# Patient Record
Sex: MALE | Race: WHITE | Employment: OTHER | URBAN - METROPOLITAN AREA
[De-identification: names, ages, dates, MRNs, and addresses within clinical notes are randomized per-mention and may not be internally consistent; named-entity substitution may affect disease eponyms.]

---

## 2017-08-09 ENCOUNTER — OFFICE VISIT (OUTPATIENT)
Dept: ORTHOPEDIC SURGERY | Age: 62
End: 2017-08-09

## 2017-08-09 VITALS
BODY MASS INDEX: 25.76 KG/M2 | SYSTOLIC BLOOD PRESSURE: 128 MMHG | HEIGHT: 71 IN | DIASTOLIC BLOOD PRESSURE: 86 MMHG | WEIGHT: 184 LBS | HEART RATE: 94 BPM

## 2017-08-09 DIAGNOSIS — M19.011 PRIMARY OSTEOARTHRITIS OF RIGHT SHOULDER: Primary | ICD-10-CM

## 2017-08-09 DIAGNOSIS — Z96.612 H/O TOTAL SHOULDER REPLACEMENT, LEFT: ICD-10-CM

## 2017-08-09 PROCEDURE — 20610 DRAIN/INJ JOINT/BURSA W/O US: CPT | Performed by: ORTHOPAEDIC SURGERY

## 2018-03-28 ENCOUNTER — OFFICE VISIT (OUTPATIENT)
Dept: ORTHOPEDIC SURGERY | Age: 63
End: 2018-03-28

## 2018-03-28 VITALS
WEIGHT: 190 LBS | SYSTOLIC BLOOD PRESSURE: 154 MMHG | HEIGHT: 71 IN | BODY MASS INDEX: 26.6 KG/M2 | HEART RATE: 84 BPM | DIASTOLIC BLOOD PRESSURE: 99 MMHG

## 2018-03-28 DIAGNOSIS — M25.511 RIGHT SHOULDER PAIN, UNSPECIFIED CHRONICITY: ICD-10-CM

## 2018-03-28 DIAGNOSIS — M19.011 PRIMARY OSTEOARTHRITIS OF RIGHT SHOULDER: Primary | ICD-10-CM

## 2018-03-28 DIAGNOSIS — Z96.612 HISTORY OF TOTAL REPLACEMENT OF LEFT SHOULDER JOINT: ICD-10-CM

## 2018-03-28 PROCEDURE — 99214 OFFICE O/P EST MOD 30 MIN: CPT | Performed by: ORTHOPAEDIC SURGERY

## 2018-03-28 PROCEDURE — 20610 DRAIN/INJ JOINT/BURSA W/O US: CPT | Performed by: ORTHOPAEDIC SURGERY

## 2018-03-29 NOTE — PROGRESS NOTES
History of Present Illness:  [address] [place] [address] for follow-up of both shoulders. With regards to his left shoulder he is now 4-1/2 years out following left anatomic total shoulder replacement. He continues to do very well with the left shoulder. He is nearly completely retired and only does a little bit of work. He has no limitations with regards to the left shoulder. This despite that we had performed this is a revision of failed resurfacing and staged reimplantation for infection. With regards to his right shoulder he has advanced osteoarthritis. I saw him about a year and half ago. I gave him a corticosteroid injection which helped for a while. He is able to hold onto pretty good function. He really isn't doing as much. He is to do heavy work and he is not doing so much. His shoulder doesn't bother him. 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 He has been taking over-the-counter anti-inflammatories. The pain in his right shoulder started about 3 or 4 years ago. Medical History:  Patient's medications, allergies, past medical, surgical, social and family histories were reviewed and updated as appropriate. Pertinent items are noted in HPI  Review of systems reviewed from Patient History Form dated on March 28, 2018 and available in the patient's chart under the Media tab. Vital Signs:  Vitals:    03/28/18 1722   BP: (!) 154/99   Pulse: 84     Constitutional: he is in no apparent distress. He appears younger than his stated age of 57. Left Shoulder Examination:    Inspection:  Well-healed surgical scar. No atrophy. No signs of an infection. Palpation:  No crepitation. Active Range of Motion: active forward elevation to 150 with good rhythm. Abduction to 130. Internal rotation to the back to T12. Passive Range of Motion:  Passive external rotation to 50. Strength:  Negative belly press test.  4 over 5 4+ over 5 external rotation strength.     Special impression is that Abrahan Sy is doing well. He would benefit from a corticosteroid injection on the right side for the osteoarthritis. We'll see him again for follow-up in a year and we will obtain repeat x-rays of his left shoulder at that time. We will also see him again as needed for additional treatment including repeat corticosteroid injection. All questions were answered today. Risks and benefits of a corticosteroid injection were discussed with Marlin Hess. 80 milligrams of Depo Medrol and 8 CC of 1% lidocaine were injected in the right Shoulder glenohumeral joint following chlorhexidine prep. He  tolerated the procedure well with no immediate adverse sequelae after the injection. Merritt Bowie MD, PhD  3/28/2018

## 2019-02-06 ENCOUNTER — OFFICE VISIT (OUTPATIENT)
Dept: ORTHOPEDIC SURGERY | Age: 64
End: 2019-02-06
Payer: COMMERCIAL

## 2019-02-06 VITALS
BODY MASS INDEX: 28.79 KG/M2 | HEIGHT: 68 IN | WEIGHT: 190 LBS | HEART RATE: 84 BPM | DIASTOLIC BLOOD PRESSURE: 86 MMHG | SYSTOLIC BLOOD PRESSURE: 126 MMHG

## 2019-02-06 DIAGNOSIS — Z96.612 H/O TOTAL SHOULDER REPLACEMENT, LEFT: ICD-10-CM

## 2019-02-06 DIAGNOSIS — M25.512 LEFT SHOULDER PAIN, UNSPECIFIED CHRONICITY: Primary | ICD-10-CM

## 2019-02-06 DIAGNOSIS — M19.011 PRIMARY OSTEOARTHRITIS OF RIGHT SHOULDER: ICD-10-CM

## 2019-02-06 PROCEDURE — 99214 OFFICE O/P EST MOD 30 MIN: CPT | Performed by: ORTHOPAEDIC SURGERY

## 2019-02-06 PROCEDURE — 20610 DRAIN/INJ JOINT/BURSA W/O US: CPT | Performed by: ORTHOPAEDIC SURGERY

## 2020-10-01 ENCOUNTER — OFFICE VISIT (OUTPATIENT)
Dept: ORTHOPEDIC SURGERY | Age: 65
End: 2020-10-01
Payer: COMMERCIAL

## 2020-10-01 VITALS — WEIGHT: 180 LBS | HEIGHT: 68 IN | BODY MASS INDEX: 27.28 KG/M2

## 2020-10-01 PROCEDURE — 99214 OFFICE O/P EST MOD 30 MIN: CPT | Performed by: ORTHOPAEDIC SURGERY

## 2020-10-01 PROCEDURE — 20610 DRAIN/INJ JOINT/BURSA W/O US: CPT | Performed by: ORTHOPAEDIC SURGERY

## 2020-10-01 RX ORDER — METHYLPREDNISOLONE ACETATE 40 MG/ML
80 INJECTION, SUSPENSION INTRA-ARTICULAR; INTRALESIONAL; INTRAMUSCULAR; SOFT TISSUE ONCE
Status: COMPLETED | OUTPATIENT
Start: 2020-10-01 | End: 2020-10-01

## 2020-10-01 RX ORDER — LIDOCAINE HYDROCHLORIDE 10 MG/ML
8 INJECTION, SOLUTION INFILTRATION; PERINEURAL ONCE
Status: COMPLETED | OUTPATIENT
Start: 2020-10-01 | End: 2020-10-01

## 2020-10-01 RX ADMIN — LIDOCAINE HYDROCHLORIDE 8 ML: 10 INJECTION, SOLUTION INFILTRATION; PERINEURAL at 11:30

## 2020-10-01 RX ADMIN — METHYLPREDNISOLONE ACETATE 80 MG: 40 INJECTION, SUSPENSION INTRA-ARTICULAR; INTRALESIONAL; INTRAMUSCULAR; SOFT TISSUE at 11:30

## 2020-10-01 NOTE — PROGRESS NOTES
Chief Complaint    Shoulder Pain (Bilateral )      History of Present Illness:  Prasad Escalante is a pleasant, 77 y.o., male, here today for follow up of his bilateral shoulders. He is now greater than 7 years out following a left anatomic total shoulder replacement performed as a revision of a failed resurfacing by another surgeon. This shoulder continues to do well. He has no pain with daily activities. With respect to the right shoulder, we have been treating him for several years now for slowly advancing primary osteoarthritis. He has received intermittent corticosteroid injections. He is hoping for an injection today. He does report these injections provide significant relief from symptoms for an extended period of time. He reports no new injuries or setbacks. Pain Assessment  Location of Pain: Shoulder  Location Modifiers: Left, Right  Severity of Pain: 2  Quality of Pain: Aching  Duration of Pain: Persistent  Frequency of Pain: Intermittent  Aggravating Factors: (overuse, sleeping)  Limiting Behavior: Some  Relieving Factors: Rest, Ice  Work-Related Injury: No  Are there other pain locations you wish to document?: No      Medical History:  Patient's medications, allergies, past medical, surgical, social and family histories were reviewed and updated as appropriate. Review of Systems  A 14 point review of systems was completed by the patient on 10/1/2020 and is available in the media section of the scanned medical record and was reviewed on 10/1/2020. The review is negative with the exception of those things mentioned in the HPI and Past Medical History    Vital Signs:  Vitals:       General/Appearance: Alert and oriented and in no apparent distress. Skin:  There are no skin lesions, cellulitis, or extreme edema. The patient has warm and well-perfused Bilateral upper extremities with brisk capillary refill.       Right Shoulder Exam:  Inspection: No gross deformities, no signs of Primary?  Bilateral shoulder pain, unspecified chronicity Yes    Status post total replacement of left shoulder     Primary osteoarthritis of right shoulder        Office Procedures:  Orders Placed This Encounter   Procedures    XR SHOULDER LEFT (MIN 2 VIEWS)     Standing Status:   Future     Number of Occurrences:   1     Standing Expiration Date:   10/1/2021     Order Specific Question:   Reason for exam:     Answer:   pain    XR SHOULDER RIGHT (MIN 2 VIEWS)     Standing Status:   Future     Number of Occurrences:   1     Standing Expiration Date:   10/1/2021     Order Specific Question:   Reason for exam:     Answer:   pain    RI ARTHROCENTESIS ASPIR&/INJ MAJOR JT/BURSA W/O US     80 mg Depomedrol with 8 cc 1% Lidocaine in 10cc syringe with 25G (22G) needle       Treatment Plan:  We explained that he does have advancing osteoarthritis of the right shoulder, however he compensates reasonably well for this. We did explain that he will likely require a total shoulder replacement at some point in the future. For now, we may continue with injections as long as they provide relief from symptoms. We would like to see this patient back every year or so to monitor progression of the arthritis, however we explained that he should be seen sooner for a significant decrease in function or increase in symptoms. Johana Velasco is in agreement with this. We will see Rachael Acosta back in 1 year and/or as needed. All questions were answered to patient's satisfaction and He was encouraged to call with any further questions or concerns. Wilhelmina Severs is in agreement with this plan. After discussing the risks and benefits of a corticosteroid injection, Rachael Acosta did state an understanding and gave verbal consent to proceed. After cleansing the injection site with Chlora-prep and using aseptic techniques,  2 CC of Depo Medrol 40mg/ml and 8 CC of 1% lidocaine were injected in the right glenohumeral joint.  He  tolerated the procedure well with no immediate adverse sequelae after the injection. A bandage was placed over the injection site. Appropriate post injections instructions were given to the patient. 10/1/2020  10:56 AM    Jasmyne Sehr ATC  [facility]    During this examination, I, Magui Swift, functioned as a scribe for Dr. Mary Morgan. The history taking and physical examination were performed by Dr. Tierra Quach. All counseling during the appointment was performed between the patient and Dr. Tierra Quach. 10/1/20   __________________  I, Dr. Mary Morgan, personally performed the services described in this documentation as described by Jasmyne Sher ATC in my presence, and it is both accurate and complete. Merritt Quach MD, PhD  10/1/2020

## 2021-08-16 ENCOUNTER — HOSPITAL ENCOUNTER (OUTPATIENT)
Dept: GENERAL RADIOLOGY | Age: 66
Discharge: HOME | End: 2021-08-16
Payer: COMMERCIAL

## 2021-08-16 ENCOUNTER — HOSPITAL ENCOUNTER (OUTPATIENT)
Age: 66
Discharge: HOME | End: 2021-08-16
Payer: COMMERCIAL

## 2021-08-16 DIAGNOSIS — Z00.00 ROUTINE GENERAL MEDICAL EXAMINATION AT A HEALTH CARE FACILITY: ICD-10-CM

## 2021-08-16 PROCEDURE — 71046 X-RAY EXAM CHEST 2 VIEWS: CPT

## 2021-09-29 ENCOUNTER — OFFICE VISIT (OUTPATIENT)
Dept: ORTHOPEDIC SURGERY | Age: 66
End: 2021-09-29
Payer: COMMERCIAL

## 2021-09-29 VITALS — BODY MASS INDEX: 27.28 KG/M2 | WEIGHT: 180 LBS | HEIGHT: 68 IN

## 2021-09-29 DIAGNOSIS — M19.011 PRIMARY OSTEOARTHRITIS OF RIGHT SHOULDER: Primary | ICD-10-CM

## 2021-09-29 PROCEDURE — 20610 DRAIN/INJ JOINT/BURSA W/O US: CPT | Performed by: ORTHOPAEDIC SURGERY

## 2021-09-29 PROCEDURE — 99213 OFFICE O/P EST LOW 20 MIN: CPT | Performed by: ORTHOPAEDIC SURGERY

## 2021-09-29 RX ORDER — LIDOCAINE HYDROCHLORIDE 10 MG/ML
20 INJECTION, SOLUTION INFILTRATION; PERINEURAL ONCE
Status: COMPLETED | OUTPATIENT
Start: 2021-09-29 | End: 2021-09-29

## 2021-09-29 RX ORDER — METHYLPREDNISOLONE ACETATE 40 MG/ML
40 INJECTION, SUSPENSION INTRA-ARTICULAR; INTRALESIONAL; INTRAMUSCULAR; SOFT TISSUE ONCE
Status: COMPLETED | OUTPATIENT
Start: 2021-09-29 | End: 2021-09-29

## 2021-09-29 RX ADMIN — METHYLPREDNISOLONE ACETATE 40 MG: 40 INJECTION, SUSPENSION INTRA-ARTICULAR; INTRALESIONAL; INTRAMUSCULAR; SOFT TISSUE at 13:05

## 2021-09-29 RX ADMIN — LIDOCAINE HYDROCHLORIDE 20 ML: 10 INJECTION, SOLUTION INFILTRATION; PERINEURAL at 13:05

## 2021-09-29 NOTE — PROGRESS NOTES
Chief Complaint    Follow-up (Right shoulder)      History of Present Illness:  Jolanta Solomon is a pleasant, 74 y.o., male, here today for follow up of his right shoulder. This patient has slowly advancing right glenohumeral osteoarthritis. We have treated him conservatively for many years for this problem. we have been administering intra-articular corticosteroid injections. His most recent injection was October 2020. This provided many months of relief. He is hoping for repeat injection today. He reports no new injuries or setbacks. Pain Assessment  Location of Pain: Shoulder  Location Modifiers: Right  Severity of Pain: 3  Quality of Pain: Aching, Dull  Duration of Pain: A few days  Frequency of Pain: Constant  Aggravating Factors: Other (Comment) (n/a)  Relieving Factors: Rest  Result of Injury: No  Work-Related Injury: No  Are there other pain locations you wish to document?: No      Medical History:  Patient's medications, allergies, past medical, surgical, social and family histories were reviewed and updated as appropriate. Review of Systems  A 14 point review of systems was completed by the patient on 10/1/20 and is available in the media section of the scanned medical record and was reviewed on 9/29/2021. The review is negative with the exception of those things mentioned in the HPI and Past Medical History    Vital Signs:  Vitals:       General/Appearance: Alert and oriented and in no apparent distress. Skin:  There are no skin lesions, cellulitis, or extreme edema. The patient has warm and well-perfused Bilateral upper extremities with brisk capillary refill. Right Shoulder Exam:  Inspection:  No gross deformities, no signs of infection. Palpation: Joint line tenderness    Active Range of Motion:   Forward Elevation 140 vs 160 on the elft, External Rotation 30 vs 50, Internal Rotation L4    Passive Range of Motion: Deferred     Strength: Deferred    Special Tests: No Richard muscle deformity. Neurovascular: Sensation to light touch is intact, no motor deficits, palpable radial pulses 2+      Radiology:     No new XR obtained at this time. Assessment :  Mr. Agapito Kasper is a pleasant, 74 y.o. patient with moderate to severe glenohumeral osteoarthritis. He is aware he is heading towards a total shoulder arthroplasty but we compensates well in terms of motion and we do not feel he will require this operation in the near future. Impression:  Encounter Diagnosis   Name Primary?  Primary osteoarthritis of right shoulder Yes       Office Procedures:  Orders Placed This Encounter   Procedures    PA ARTHROCENTESIS ASPIR&/INJ MAJOR JT/BURSA W/O US       Treatment Plan: We will continue in conservative management as long as he finds meaningful relief from symptoms. We will repeat the injection today. We may continue to repeat these injections as needed. We will see Tillie Frankel back as needed. All questions were answered to patient's satisfaction and He was encouraged to call with any further questions or concerns. Romayne Sacramento is in agreement with this plan. After discussing the risks and benefits of a corticosteroid injection, Tillie Frankel did state an understanding and gave verbal consent to proceed. After cleansing the injection site with Chlora-prep and using aseptic techniques,  2 CC of Depo Medrol 40mg/ml and 8 CC of 1% lidocaine were injected in the right glenohumeral joint. He  tolerated the procedure well with no immediate adverse sequelae after the injection. A bandage was placed over the injection site. Appropriate post injections instructions were given to the patient. 9/29/2021  1:28 PM    Raphael Aguilar, ALANA  [facility] [address] [facility]    During this examination, I, Mauricio Navdeep, functioned as a scribe for Dr. René Moser. The history taking and physical examination were performed by Dr. Hannah Rolon.  All counseling during the appointment was performed between the patient and Dr. Tierra Quach. 9/29/21    ______________  I, Dr. Mary Morgan, personally performed the services described in this documentation as described by Jasmyne Sher ATC in my presence, and it is both accurate and complete. Merritt Quach MD, PhD  9/29/2021

## 2021-11-08 ENCOUNTER — PROCEDURE VISIT (OUTPATIENT)
Dept: AUDIOLOGY | Age: 66
End: 2021-11-08
Payer: COMMERCIAL

## 2021-11-08 ENCOUNTER — OFFICE VISIT (OUTPATIENT)
Dept: ENT CLINIC | Age: 66
End: 2021-11-08
Payer: COMMERCIAL

## 2021-11-08 VITALS
WEIGHT: 174 LBS | SYSTOLIC BLOOD PRESSURE: 147 MMHG | HEART RATE: 66 BPM | DIASTOLIC BLOOD PRESSURE: 78 MMHG | HEIGHT: 68 IN | BODY MASS INDEX: 26.37 KG/M2

## 2021-11-08 DIAGNOSIS — H91.90 HEARING LOSS, UNSPECIFIED HEARING LOSS TYPE, UNSPECIFIED LATERALITY: Primary | ICD-10-CM

## 2021-11-08 DIAGNOSIS — H93.13 TINNITUS OF BOTH EARS: ICD-10-CM

## 2021-11-08 DIAGNOSIS — H90.3 SENSORINEURAL HEARING LOSS (SNHL) OF BOTH EARS: ICD-10-CM

## 2021-11-08 DIAGNOSIS — H90.3 SENSORINEURAL HEARING LOSS (SNHL) OF BOTH EARS: Primary | ICD-10-CM

## 2021-11-08 DIAGNOSIS — H93.13 TINNITUS OF BOTH EARS: Primary | ICD-10-CM

## 2021-11-08 PROCEDURE — 92557 COMPREHENSIVE HEARING TEST: CPT | Performed by: AUDIOLOGIST

## 2021-11-08 PROCEDURE — 99203 OFFICE O/P NEW LOW 30 MIN: CPT | Performed by: OTOLARYNGOLOGY

## 2021-11-08 PROCEDURE — 92567 TYMPANOMETRY: CPT | Performed by: AUDIOLOGIST

## 2021-11-08 NOTE — PROGRESS NOTES
Kary Maria   1955, 74 y.o. male   [id number]       Referring Provider: Kaylene Brady MD  Referral Type: In an order in [address] [place] [id number]    Reason for Visit: Evaluation of suspected change in hearing, tinnitus, or balance. ADULT AUDIOLOGIC EVALUATION      Kary Maria is a 74 y.o. male seen today, 11/8/2021 , for an initial audiologic evaluation. Patient was seen by Kaylene Brady MD following today's evaluation. AUDIOLOGIC AND OTHER PERTINENT MEDICAL HISTORY:      Kary Maria noted tinnitus and history of occupational noise exposure. Patient reports progressive, bilateral, tinnitus that is intermittent in nature. 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 Kary Maria denied otalgia, aural fullness, otorrhea, dizziness, imbalance, history of falls, history of head trauma, history of ear surgery and family history of hearing loss. Date: 11/8/2021     IMPRESSIONS:      AD: Hearing WNL sloping to Moderately-Severe rising to Moderate SNHL, Excellent WRS, Type A tymp  AS: Hearing WNL sloping to Mild SNHL, Excellent WRS, Type A tymp    Test results consistent with asymmetric Sensorineural hearing loss. Hearing loss significant enough to create hearing difficulty in some listening situations. Discussed hearing loss, tinnitus, hearing aids and NIHL with patient. Patient to follow medical recommendations per Kaylene Brady MD .    ASSESSMENT AND FINDINGS:     Otoscopy revealed: Clear ear canals bilaterally    RIGHT EAR:  Hearing Sensitivity: Normal hearing sensitivity to Moderately-Severe to Moderate Sensorineural hearing loss  Speech Recognition Threshold: 15 dB HL  Word Recognition:Excellent (100%), based on NU-6 25-word list at 65 dBHL using recorded speech stimuli. Tympanometry: Normal peak pressure and compliance, Type A tympanogram, consistent with normal middle ear function.   Acoustic Reflexes: Ipsilateral: Could not maintain seal. Contralateral: Could not maintain seal.    LEFT EAR:  Hearing Sensitivity: Normal hearing sensitivity to Mild   Sensorineural hearing loss  Speech Recognition Threshold: 15 dB HL  Word Recognition:Excellent (100%), based on NU-6 25-word list at 65 dBHL using recorded speech stimuli. Tympanometry: Normal peak pressure and compliance, Type A tympanogram, consistent with normal middle ear function. Acoustic Reflexes: Ipsilateral: Could not maintain seal. Contralateral: Could not maintain seal.    Reliability: Good  Transducer: Inserts    See scanned audiogram dated 11/8/2021  for results. PATIENT EDUCATION:     The following items were discussed with the patient:   - Good Communication Strategies  - Hearing Loss and Hearing Aids  - Tinnitus Management Strategies    - Noise-Induced Hearing Loss and use of Hearing Protection Devices (HPDs)     Educational information was shared in the After Visit Summary. RECOMMENDATIONS:                                                                                                                                                                                                                                                          The following items are recommended based on patient report and results from today's appointment:   - Continue medical follow-up with Christa Medina MD.   - Retest hearing as medically indicated and/or sooner if a change in hearing is noted. - If desired, schedule a Hearing Aid Evaluation (HAE) appointment to discuss hearing aid options. - Utilize \"Good Communication Strategies\" as discussed to assist in speech understanding with communication partners. - Maintain a sound enriched environment to assist in the management of tinnitus symptoms.  - Use hearing protection devices (HPDs), such as protective ear muffs and ear plugs, when exposed to dangerous sound levels.        Myrtle Montero  Audiologist    Chart CC'd to: Christa Medina

## 2021-11-08 NOTE — PROGRESS NOTES
[place]      Patient Name: [address] Record Number:  [id number]  Primary Care Physician:  Minnie Chavez  Date of Consultation: 11/8/2021    Chief Complaint: Ringing in the [address]  Frederick Olson is a(n) 74 y.o. male who presents for evaluation of ringing in the ears. Patient has been having ringing in the ears for quite some time. The little worse on the right side. 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 He is never had ear surgery. No ear pain. No other ear problems or complaints. Overall he feels as though he hears okay. REVIEW OF SYSTEMS  As above    PHYSICAL EXAM  GENERAL: No Acute Distress, Alert and Oriented, no Hoarseness, strong voice  EYES: EOMI, Anti-icteric  HENT:   Head: Normocephalic and atraumatic. Face:  Symmetric, facial nerve intact, no sinus tenderness  Right Ear: Normal external ear, normal external auditory canal, intact tympanic membrane with normal mobility and aerated middle ear  Left Ear: Normal external ear, normal external auditory canal, intact tympanic membrane with normal mobility and aerated middle ear  Mouth/Oral Cavity:  normal lips, Uvula is midline, no mucosal lesions  Oropharynx/Larynx:  normal oropharynx  Nose:Normal external nasal appearance. NECK: Normal range of motion, no thyromegaly, trachea is midline, no lymphadenopathy, no neck masses, no crepitus    Patient got an audiogram today that shows normal sloping to moderate sensorineural hearing loss on the right with normal sloping to mild sensorineural hearing loss on the left    ASSESSMENT/PLAN  1. Tinnitus of both ears  Secondary to the hearing loss. Recommended masking versus getting a hearing aid. Patient does not think it is bothersome enough that he needs a hearing aid yet.     2. Sensorineural hearing loss (SNHL) of both ears  There is a little bit of asymmetry so I recommend getting another hearing test in 1 year. If there is further asymmetry we will get an MRI. I have performed a head and neck physical exam personally or was physically present during the key or critical portions of the service. This note was generated completely or in part utilizing Dragon dictation speech recognition software. Occasionally, words are mistranscribed and despite editing, the text may contain inaccuracies due to incorrect word recognition. If further clarification is needed please contact the office at [phone].

## 2021-11-08 NOTE — Clinical Note
Dr. Brandyn Rob,    Please see note from this patient's audiogram from today. Please let me know if there is anything further you need.         Myrtle Estrada  Audiologist

## 2021-11-08 NOTE — PATIENT INSTRUCTIONS
If you are interested in pursuing hearing aids, here are the next steps:    1) Contact your insurance and ask, Do I have a benefit for hearing aids?    If the answer is no hearing aids will be a 100% out of pocket expense   If the answer is yes, ask Can I use this benefit at [facility] ([facility])?  or Where can I use this benefit?  If Eve Reyes is not in-network for hearing aids, your insurance should be able to provide the appropriate contact information for an in-network provider. 2) Call [facility] ([phone]) to schedule a Hearing Aid Evaluation    This appointment is when we will discuss hearing aid options and decide which device is most appropriate for you. Learning About Hearing Aids  What is a hearing aid? A hearing aid makes sounds louder. It can help some people with hearing problems to hear better. Hearing aids do not restore normal hearing. But they can make it easier to communicate by making sounds clearer. · Digital programmable hearing aids can adjust themselves to work best where you are at any time. You also have more choices in setting them up than with analog hearing aids. There are also different styles of hearing aids. · A behind-the-ear (BTE) hearing aid connects to a plastic ear mold that fits inside the outer ear. BTE hearing aids are used for all levels of hearing loss, especially very severe hearing loss. They may be better for children for safety and growth reasons. Poorly fitting BTE ear molds or a buildup of earwax may cause a whistling sound (feedback). · An in-the-ear (ITE) hearing aid fits in the outer part of the ear. It can be used by people with mild to severe hearing loss. ITE hearing aids can be used with other hearing devices, such as a telecoil that improves hearing during phone calls. ITE hearing aids can be damaged by earwax and fluid draining from the ear. Their small size may be hard for some people to handle.  They are not often used in children because the case must be replaced as the child grows. · An in-the-canal (ITC) hearing aid fits into the ear canal. ITC hearing aids are used by people with mild to moderate hearing loss. They are made to fit the shape and the size of your ear canal. They can be damaged by earwax and fluid draining from the ear. Their small size may be hard for some people to handle. They are not made for children. You have some options if you have a hearing problem and are thinking about getting hearing aids. You can go to your doctor or an audiologist. He or she will do a hearing test and help you decide which type and style of hearing aid may be best for you. What else should I know about hearing aids? Find out if your insurance covers hearing aids. They can be expensive. Different types of hearing aids come with different costs. Also find out about a warranty or return policy in case you are not happy with your hearing aids. Follow-up care is a key part of your treatment and safety. Be sure to make and go to all appointments, and call your doctor if you are having problems. It's also a good idea to know your test results and keep a list of the medicines you take. Where can you learn more? Go to https://[email or web address]. org and sign in to your [employer] account. Enter A976 in the [facility] box to learn more about \"Learning About Hearing Aids. \"     If you do not have an account, please click on the \"Sign Up Now\" link. Current as of: October 21, 2018  Content Version: 12.1  © 6188-0859 Healthwise, Incorporated. Care instructions adapted under license by [facility] ([facility]). If you have questions about a medical condition or this instruction, always ask your healthcare professional. [place] [address] any warranty or liability for your use of this information.       Hearing Loss: Care Instructions  Your Care Instructions      Hearing loss is a sudden or slow decrease in how well you hear. It can range from mild to profound. Permanent hearing loss can occur with aging, and it can happen when you are exposed long-term to loud noise. Examples include listening to loud music, riding motorcycles, or being around other loud machines. Hearing loss can affect your work and home life. It can make you feel lonely or depressed. You may feel that you have lost your independence. But hearing aids and other devices can help you hear better and feel connected to others. Follow-up care is a key part of your treatment and safety. Be sure to make and go to all appointments, and call your doctor if you are having problems. It's also a good idea to know your test results and keep a list of the medicines you take. How can you care for yourself at home? · Avoid loud noises whenever possible. This helps keep your hearing from getting worse. Always wear hearing protection around loud noises. · If appropriate, wear hearing aid(s) as directed. It is recommended that hearing aids are worn during all waking hours to keep your brain active and give it access to the sounds it is missing. · If you are beginning your process with hearing aid(s), schedule a \"Hearing Aid Evaluation\" with an audiologist to discuss your lifestyle, features of hearing aid technology, and styles of hearing aids available. It is recommended that you contact your insurance company to determine if you have a hearing aid benefit, as this may dictate who you can see for these services. · Have hearing tests as your doctor suggests. They can show whether your hearing has changed. Your hearing aid may need to be adjusted. · Use other assistive devices as needed. These may include:  ? Telephone amplifiers and hearing aids that can connect to a television, stereo, radio, or microphone. ? Devices that use lights or vibrations. These alert you to the doorbell, a ringing telephone, or a baby monitor. ? Television closed-captioning. This shows the words at the bottom of the screen. Most new TVs can do this. ? TTY (text telephone). This lets you type messages back and forth on the telephone instead of talking or listening. These devices are also called TDD. When messages are typed on the keyboard, they are sent over the phone line to a receiving TTY. The message is shown on a monitor. · Use pagers, fax machines, text, and email if it is hard for you to communicate by telephone. · Try to learn a listening technique called speech-reading. It is not lip-reading. You pay attention to people's gestures, expressions, posture, and tone of voice. These clues can help you understand what a person is saying. Face the person you are talking to, and have him or her face you. Make sure the lighting is good. You need to see the other person's face clearly. · Think about counseling if you need help to adjust to your hearing loss. When should you call for help? Watch closely for changes in your health, and be sure to contact your doctor if:    · You think your hearing is getting worse. · You have new symptoms, such as dizziness or nausea. Good Communication Strategies    Communication can be challenging for anyone, but can be especially difficult for those with some degree of hearing loss. While we may not be able to control every factor that may lead to difficulty with communication, there are Good Communication Strategies that we can all use in our day-to-day lives. Communication takes both parties working together for it to be successful. Tips as a Listener:   1. Control your environment. It is important to limit the amount of background noise in the room when possible. You should also consider having a good light source in the room to best see the other person. 2. Ask for clarification. Instead of saying \"What?\", you can use parts of what you heard to make a new question.   For example, if you heard the word \"Thursday\" but not the rest of the week, you may ask \"What was that about Thursday? \" or \"What did you want to do Thursday? \". This shows the person talking that you are listening and will help them better explain what they are saying. 3. Be an advocate for yourself. If you are hearing but not understanding, tell the other person \"I can hear you, but I need you to slow down when you speak. \"  Or if someone is facing the other direction, say \"I cannot hear you when you are not looking at me when we talk. \"       Tips as a Talker:   - Sit or stand 3 to 6 feet away to maximize audibility         -- It is unrealistic to believe someone else will fully hear your message if you are speaking from across the room or in a different room in the house   - Stay at eye level to help with visual cues   - Make sure you have the persons attention before speaking   - Use facial expressions and gestures to accentuate your message   - Raise your voice slightly (do not scream)   - Speak slowly and distinctly   - Use short, simple sentences   - Rephrase your words if the person is having a hard time understanding you    - To avoid distortion, dont speak directly into a persons ear      Some additional items that may be helpful:   - Remain patient - this is important for both parties   - Write down items that still cannot be heard/understood. You may write with pen/paper or consider typing/texting on a cell phone or smart device. - If background noise is unavoidable, try to keep yourself in a good position in the room. By sitting at a leon on the side of the restaurant (preferably a corner), it will be easier to communicate than if you were sitting at a table in the middle with background noise surrounding you. Keep yourself positioned away from music speakers or heavy foot traffic. Tinnitus: Overview and Management Strategies          Many people have some ringing sounds in their ears once in a while.  You may hear a roar, a hiss, a tinkle, or a having problems. It's also a good idea to know your test results and keep a list of the medicines you take. How can you care for yourself at home? · Limit or cut out alcohol, caffeine, and sodium. They can make your symptoms worse. · Do not smoke or use other tobacco products. Nicotine reduces blood flow to the ear and makes tinnitus worse. If you need help quitting, talk to your doctor about stop-smoking programs and medicines. These can increase your chances of quitting for good. · Talk to your doctor about whether to stop taking aspirin and similar products such as ibuprofen or naproxen. · Get exercise often. It can help improve blood flow to the ear. Hearing Aids and Other Devices  A hearing aid may help your tinnitus if you have a hearing loss. An audiologist can help you find and use the best hearing aid for you. Tinnitus maskers look like hearing aids. They make a sound that masks, or covers up, the tinnitus. The masking sound distracts you from the ringing in your ears. You may be able to use a masker and a hearing aid at the same time. Sound machines can be useful at night or during quiet times. There are machines you can buy at the store. Or, you can find apps on your phone that make sounds, like the ocean or rainfall. Fish tanks, fans, quiet music, and indoor waterfalls can help, as well. Ways to manage/cope with tinnitus  Some tinnitus may last a long time. To manage your tinnitus, try to:  · Avoid noises that you think caused your tinnitus. If you can't avoid loud noises, wear earplugs or earmuffs. · Ignore the sound by paying attention to other things. Keeping your brain busy with other tasks or background noise can help your brain not focus on the tinnitus. · Try to not give the tinnitus an emotional reaction. Do your best to ignore the sound and not let it bother you. Relax using biofeedback, meditation, or yoga. Feeling stressed and being tired can make tinnitus worse.   · Play music or white noise to help you sleep. Background noise may cover up the noise that you hear in your ears. You can buy a tabletop machine or a device that sits under your pillow to play soothing sounds, like ocean waves. · Smart phones have free apps, such as Whist, Relax Melodies, ReSound Relief, and White Noise Lite. These apps have different types of sounds/noise, some of which you can blend together to find sounds that are most soothing to you. · Hearing aid technology, especially when there is some hearing loss, may help reduce tinnitus symptoms by giving your brain better access to the sounds it is missing. There are some hearing aids with built-in noise generator programs, which may help when amplification alone is not enough. Additional resources may be found through the American Tinnitus Association at www.joe.org    When should you call for help? Call 911 anytime you think you may need emergency care. For example, call if:    · You have symptoms of a stroke. These may include:  ? Sudden numbness, tingling, weakness, or loss of movement in your face, arm, or leg, especially on only one side of your body. ? Sudden vision changes. ? Sudden trouble speaking. ? Sudden confusion or trouble understanding simple statements. ? Sudden problems with walking or balance. ? A sudden, severe headache that is different from past headaches. Call your doctor now or seek immediate medical care if:    · You develop other symptoms. These may include hearing loss (or worse hearing loss), balance problems, dizziness, nausea, or vomiting. Watch closely for changes in your health, and be sure to contact your doctor if:    · Your tinnitus moves from both ears to one ear. · Your hearing loss gets worse within 1 day after an ear injury. · Your tinnitus or hearing loss does not get better within 1 week after an ear injury.      · Your tinnitus bothers you enough that you want to take medicines to help you cope with it. If you notice changes in your tinnitus and/or your hearing, it is recommended that you have your hearing tested by your audiologist and to follow-up with your physician that manages your hearing loss (such as your ENT or Primary Care doctor). Noise-Induced Hearing Loss  What it is, and what you can do to prevent it      Exposure to loud sounds, in an occupational setting or recreational, can cause permanent hearing loss. Sound is measured in decibels (dB). Noise-induced hearing loss is the ONLY type of preventable hearing loss. Hearing loss related to noise exposure can occur at any age. There are small sensory cells, called inner and outer hair cells, within the inner ear (cochlea). These cells process the loudness (intensity) and pitch (frequency) of sound and send the signal to the brain via our auditory nerve (vestibulocochlear nerve, cranial nerve VIII). When these cells are damaged, they can result in permanent hearing loss and/or tinnitus. The hair cells responsible for high frequency sounds, like birds chirping, are most likely to be damaged due to loud sounds. The high frequency sounds are also very important for our clarity and understanding of speech. OCCUPATIONAL NOISE EXPOSURE RECREATIONAL NOISE EXPOSURE   Some jobs may have exposure to loud sounds in the workplace. These jobs may include but are not limited to:  [employer]   Construction   Welding   Landscaping   Hairdressing/hairstyling   Musicians  [employer]    ... And more! Many activities outside of work may cause permanent hearing loss. These activities may include but are not limited to:    Lawnmowers, leaf blowers  [employer] (such as pigs squealing)   Chainsaws and other power tools  [employer] musical instruments and/or singing   Listening to music too loudly - at concerts, through stereo, through ear buds or headphones   Attending sporting events   Attending fireworks shows or using fireworks at home  [employer] of firearms   . .. And more! REDUCE OR PROTECT YOUR EARS FROM NOISE EXPOSURE    To do your best to avoid noise-induced hearing loss, here are some tips:   Limit exposure to loud sounds. 85 dB (decibels) is safe for 8 hours. As sounds are louder, the length of time the sound is safe lessens. These numbers are cumulative across a 24-hour period. (NIOSH and CDC, 2002)  o 85 dB is safe for 8 hours  o 88 dB is safe for 4 hours  o 91 dB is safe for 2 hours  o 94 dB is safe for 1 hour  o 97 dB is safe for 30 minutes  o 100 dB is safe for 15 minutes  o 103 dB is safe for 7.5 minutes  o 106 dB is safe for 3.75 minutes  o 109 dB is safe for LESS THAN 2 minutes  o 112 dB is safe for LESS THAN 1 minute  o 115 dB is safe for ~ 30 seconds  o 130 dB can cause IMMEDIATE hearing loss   If you are unsure if a sound is too loud, consider checking the sound level with a \"sound level meter\". There are apps on smart devices that can measure the loudness of the sound. They are not as accurate as expensive equipment used by scientists, but it will give you a guesstimate of how loud the sound is, and if it may be damaging to your hearing.  If you cannot avoid loud sounds, here are ways to reduce your exposure:  o 1. Wear hearing protection  - Ear plugs and protective ear muffs can be used to reduce the intensity of the sound. The higher the NRR (noise reduction rating), the better reduction of the intensity of the sound   o 2. Turn the volume down  - When listening to music, turn the volume down, especially when wearing ear buds or headphones. A good rule of thumb is to not go beyond the middle setting on your device. If you can't hear someone talking to you from arm's length away, your music may be at a level that it can cause damage.   If someone else can hear your music from 3 feet away, it may also be at a level that

## 2021-12-06 ENCOUNTER — TELEPHONE (OUTPATIENT)
Dept: ORTHOPEDIC SURGERY | Age: 66
End: 2021-12-06

## 2021-12-14 ENCOUNTER — OFFICE VISIT (OUTPATIENT)
Dept: ORTHOPEDIC SURGERY | Age: 66
End: 2021-12-14
Payer: COMMERCIAL

## 2021-12-14 VITALS — BODY MASS INDEX: 26.37 KG/M2 | WEIGHT: 174 LBS | HEIGHT: 68 IN

## 2021-12-14 DIAGNOSIS — M25.521 RIGHT ELBOW PAIN: Primary | ICD-10-CM

## 2021-12-14 DIAGNOSIS — M19.021 ARTHRITIS OF RIGHT ELBOW: ICD-10-CM

## 2021-12-14 PROCEDURE — 20605 DRAIN/INJ JOINT/BURSA W/O US: CPT | Performed by: ORTHOPAEDIC SURGERY

## 2021-12-14 PROCEDURE — 99214 OFFICE O/P EST MOD 30 MIN: CPT | Performed by: ORTHOPAEDIC SURGERY

## 2021-12-14 RX ORDER — METHYLPREDNISOLONE ACETATE 40 MG/ML
80 INJECTION, SUSPENSION INTRA-ARTICULAR; INTRALESIONAL; INTRAMUSCULAR; SOFT TISSUE ONCE
Status: COMPLETED | OUTPATIENT
Start: 2021-12-14 | End: 2021-12-14

## 2021-12-14 RX ORDER — TAMSULOSIN HYDROCHLORIDE 0.4 MG/1
0.4 CAPSULE ORAL NIGHTLY
COMMUNITY
Start: 2021-12-08

## 2021-12-14 RX ORDER — LIDOCAINE HYDROCHLORIDE 10 MG/ML
2 INJECTION, SOLUTION INFILTRATION; PERINEURAL ONCE
Status: COMPLETED | OUTPATIENT
Start: 2021-12-14 | End: 2021-12-14

## 2021-12-14 RX ADMIN — LIDOCAINE HYDROCHLORIDE 2 ML: 10 INJECTION, SOLUTION INFILTRATION; PERINEURAL at 17:43

## 2021-12-14 RX ADMIN — METHYLPREDNISOLONE ACETATE 80 MG: 40 INJECTION, SUSPENSION INTRA-ARTICULAR; INTRALESIONAL; INTRAMUSCULAR; SOFT TISSUE at 17:44

## 2021-12-14 NOTE — LETTER
Physical Therapy Rehabilitation Referral    Patient Name:  La Bell      YOB: 1955    Diagnosis:    1. Right elbow pain    2. Arthritis of right elbow        Precautions:     [x] Evaluate and Treat    Post Op Instructions:  [] Continuous passive motion (CPM) [x] Elbow ROM  [x] Exercise in plane of scapula  [x]  Strengthening     [] Pulley and instruction   [x] Home exercise program (copy to patient)   [] Sling when arm at risk  [] Sling or brace at all times   [] AAROM: Forward elevation to  140            [] AAROM: External rotation  To  40    [] Isometric external rotator strengthening [] AAROM: internal rotation: up the back  [x] Isometric abductor strengthening  [] AAROM: Internal abduction   [] Isometric internal rotator strengthening [] AAROM: cross-body adduction             Stretching:     Strengthening:  [] Four quadrant (FE, ER, IR, CBA)  [] Rotator cuff (ER, IR, Abd)  [] Forward Elevation    [] External Rotators     [] External Rotation    [] Internal Rotators  [] Internal Rotation: up/back   [] Abductors     [] Internal Rotation: supine in abduction  [] Sleeper Stretch    [] Flexors  [] Cross-body abduction    [] Extensors  [] Pendulum (FE, Abd/Add, cw/ccw)  [x] Scapular Stabilizers   [] Wall-walking (FE, Abd)        [x] Shoulder shrugs     [] Table slides (FE)                [x] Rhomboid pinch  [x] Elbow (flex, ext, pron, sup)        [] Lat.  Pull downs     [x] Medial epicondylitis program       [] Forward punch   [x] Lateral epicondylitis program       [] Internal rotators     [x] Progressive resistive exercises  [] Bench Press        [] Bench press plus  Activities:     [] Lateral pull-downs  [] Rowing     [] Progressive two-hand supine press  [] Stepper/Exercise bike   [] Biceps: curls/supination  [] Swimming  [] Water exercises    Modalities:     Return to Sport:  [x] Of Choice      [] Plyometrics  [] Ultrasound     [] Rhythmic stabilization  [] Iontophoresis    [] Core strengthening   [] Moist heat     [] Sports specific program:   [] Massage         [x] Cryotherapy      [] Electrical stimulation     [] Paraffin  [] Whirlpool  [] TENS    [x] Home exercise program (copy to patient). Perform exercises for:   15     minutes    3      times/day  [x] Supervised physical therapy  Frequency: []  1x week  [x] 2x week  [] 3x week  [] Other:   Duration: [] 2 weeks   [] 4 weeks  [x] 6 weeks  [] Other:     Additional Instructions:     Merritt Youssef MD, PhD

## 2021-12-14 NOTE — PROGRESS NOTES
Date:  2021    Name:  Joyce Johnson  Address:  [address] [place] [id number]    :  1955      Age:   74 y.o.    SSN:  [id number]      Medical Record Number:  [id number]    Reason for Visit:    Chief Complaint    Shoulder Pain (F/U RIGHT SHOULDER/ELBOW)      DOS:2021     HPI: Annalise Britton is a 74 y.o. male here today for right elbow. He says that last Thursday he went to hand magazine over to his wife felt a sudden loud pop associated with pain. He then could not fully extend or bend his elbow for couple days. He reports it has been improving since then. He continues to have pain but is unable to straighten his elbow out completely. He denies any numbness or tingling. He reports this is the first time this has occurred. Pain Assessment  Location of Pain: Elbow  Location Modifiers: Right  Severity of Pain: 3  Quality of Pain: Sharp, Aching  Duration of Pain: Persistent  Frequency of Pain: Intermittent  Aggravating Factors: Other (Comment), Stretching, Bending, Straightening  Limiting Behavior: Yes  Relieving Factors: Rest, Ice  Result of Injury: No  Work-Related Injury: No  Are there other pain locations you wish to document?: No  ROS: All systems reviewed and otherwise negative. Pertinent items are noted in HPI.         Past Medical History:   Diagnosis Date    Arthritis     Bronchitis     Diverticulitis     Hyperlipidemia     Hypertension     Kidney stone         Past Surgical History:   Procedure Laterality Date    HEEL SPUR SURGERY      HERNIA REPAIR      JOINT REPLACEMENT      NASAL SEPTUM SURGERY      SHOULDER ARTHROPLASTY      TOTAL KNEE ARTHROPLASTY         Family History   Problem Relation Age of Onset    Stroke Mother     Heart Disease Mother     Stroke Father     Heart Disease Sister        Social History     Socioeconomic History    Marital status:      Spouse name: None    Number of children: None    Years of education: None    Highest education level: None   Occupational History    None   Tobacco Use    Smoking status: Former Smoker    Smokeless tobacco: Never Used   Substance and Sexual Activity    Alcohol use: Yes     Comment: rare    Drug use: No    Sexual activity: Yes     Partners: Female   Other Topics Concern    None   Social History Narrative    None     Social Determinants of Health     Financial Resource Strain:     Difficulty of Paying Living Expenses: Not on file   Food Insecurity:     Worried About Running Out of Food in the Last Year: Not on file    Gypsy of Food in the Last Year: Not on file   Transportation Needs:     Lack of Transportation (Medical): Not on file    Lack of Transportation (Non-Medical): Not on file   Physical Activity:     Days of Exercise per Week: Not on file    Minutes of Exercise per Session: Not on file   Stress:     Feeling of Stress : Not on file   Social Connections:     Frequency of Communication with Friends and Family: Not on file    Frequency of Social Gatherings with Friends and Family: Not on file    Attends [employer] Services: Not on file    Active Member of [address] [place] [address] or Organizations: Not on file    Attends Club or Organization Meetings: Not on file    Marital Status: Not on file   Intimate Partner Violence:     Fear of Current or Ex-Partner: Not on file    Emotionally Abused: Not on file    Physically Abused: Not on file    Sexually Abused: Not on file   Housing Stability:     Unable to Pay for Housing in the Last Year: Not on file    Number of [place] in the Last Year: Not on file    Unstable Housing in the Last Year: Not on file       Current Outpatient Medications   Medication Sig Dispense Refill    tamsulosin (FLOMAX) 0.4 MG capsule       PROAIR  (90 BASE) MCG/ACT inhaler   0    amLODIPine (NORVASC) 10 MG tablet Take 10 mg by mouth daily.  MULTIPLE VITAMINS PO Take  by mouth. No current facility-administered medications for this visit. Allergies   Allergen Reactions    Vicodin [Hydrocodone-Acetaminophen] Swelling       Vital signs:  Ht 5' 8\" (1.727 m)   Wt 174 lb (78.9 kg)   BMI 26.46 kg/m²        Neuro: Alert & oriented x 3,  normal,  no focal deficits noted. Normal affect. Eyes: sclera clear  Ears: Normal external ear  Mouth:  No perioral lesions  Pulm: Respirations unlabored and regular  Pulse: Regular rate and rhythm   Skin: Warm, well perfused    right Elbow Examination:    Inspection:  No skin lesions, no deformity, no swelling    Palpation: Mild tenderness over the olecranon over the medial aspect. No tenderness to palpation over the lateral epicondyle, medial epicondyle, olecranon. No tenderness to palpation over the mobile wad, flexor pronator mass, biceps, triceps. No tenderness over the ulnar collateral ligament and the lateral collateral ligament complex    Range of Motion: Lacks 5 degrees of full extension and flexion of 120. He he is unable to touch his ears. normal supination and pronation with the elbow at 90°    Strength:  Normal strength of the wrist extensors and flexors, normal strength of the biceps and triceps    Stability:  No instability noted to varus and valgus stress, no rotatory instability with pushup test    Neurovascular: Palpable radial pulse, normal sensation in the median, ulnar, radial nerve distributions    Comparison left Elbow Examination:    Inspection:  No skin lesions, no deformity, no swelling    Palpation:  No tenderness to palpation over the lateral epicondyle, medial epicondyle, olecranon. No tenderness to palpation over the mobile wad, flexor pronator mass, biceps, triceps.   No tenderness over the ulnar collateral ligament and the lateral collateral ligament complex    Range of Motion: full extension and able to touch her shoulders in flexion, normal supination and pronation with the elbow at 90°    Strength:  Normal strength of the wrist extensors and flexors, normal strength of the biceps and triceps    Stability:  No instability noted to varus and valgus stress, no rotatory instability with pushup test    Neurovascular: Palpable radial pulse, normal sensation in the median, ulnar, radial nerve distributions      Diagnostics:  Radiology:     X-rays of the elbow including AP, Oblique and Lateral were obtained and reviewed in office:    Impression: He does have some mild degenerative changes within the elbow joint. There is an osteophyte at the tip of the olecranon and the loose body as well. After discussing the risks and benefits of a corticosteroid injection, Chhaya Henson did state an understanding and gave verbal consent to proceed. After cleansing the injection site with Chlora-prep and using aseptic techniques,  2 CC of Depo Medrol 40mg/ml and 2 CC of 1% lidocaine were injected in the right elbow joint. He  tolerated the procedure well with no immediate adverse sequelae after the injection. A bandage was placed over the injection site. Appropriate post injections instructions were given to the patient. Assessment: Kylie Block is a 74 y.o. male with right elbow osteoarthritis with loose body seen on x-rays. He has sustained a recent flare-up that likely involved mechanical locking from the loose body but this is improving. Plan: We feel that his mechanical symptoms of locking was associated with the loose body that was seen on his radiographs. He was told that for now we would recommend a trial of conservative management including a corticosteroid injection and a couple sessions of physical therapy to restore his full elbow range of motion. However if these episodes become more more frequent we can certainly consider an arthroscopy to clean out his joint and to remove the loose bodies. Patient was agreeable to the above plan. All his questions were fully answered we will certainly see him back if his symptoms continue to stay persistent and 4 weeks.     Greater than 30 minutes were expended on all aspects of today's visit. 12/14/2021  11:32 AM      Eloy Medina PA-C  Orthopaedic Sports Medicine Physician Assistant    During this examination, I, Eloy Medina PA-C, functioned as a scribe for Dr. Donita Mattson. This dictation was performed with a verbal recognition program (DRAGON) and it was checked for errors. It is possible that there are still dictated errors within this office note. If so, please bring any errors to my attention for an addendum. All efforts were made to ensure that this office note is accurate.  __________________  I, Dr. Donita Mattson, personally performed the services described in this documentation as described by Eloy Medina PA-C in my presence, and it is both accurate and complete. Merritt Beltran MD, PhD  12/14/2021

## 2021-12-20 ENCOUNTER — HOSPITAL ENCOUNTER (OUTPATIENT)
Dept: PHYSICAL THERAPY | Age: 66
Setting detail: THERAPIES SERIES
Discharge: HOME | End: 2021-12-20
Payer: COMMERCIAL

## 2021-12-20 PROCEDURE — 97161 PT EVAL LOW COMPLEX 20 MIN: CPT

## 2021-12-20 PROCEDURE — 97110 THERAPEUTIC EXERCISES: CPT

## 2021-12-20 PROCEDURE — 97530 THERAPEUTIC ACTIVITIES: CPT

## 2021-12-20 NOTE — FLOWSHEET NOTE
[address] [facility], [place]    Physical Therapy Daily Treatment Note  Date:  2021    Patient Name:  Jason Robles    :  1955  MRN: [id number]  Restrictions/Precautions:    Medical/Treatment Diagnosis Information:  · Diagnosis: M19.021 (ICD-10-CM) - Arthritis of right elbow  · Treatment Diagnosis: M25.521 R Elbow Pain  Insurance/Certification information:  PT Insurance Information: [employer]; 80 VPCY; no auth; no copay  Physician Information:  Referring Practitioner: Dr. Danial Dutton  Has the plan of care been signed (Y/N):        []  Yes  [x]  No     Date of Patient follow up with Physician: 22      Is this a Progress Report:     []  Yes  [x]  No        If Yes:  Date Range for reporting period:  Beginnin21  Ending:    Progress report will be due (10 Rx or 30 days whichever is less): 77       Recertification will be due (POC Duration  / 90 days whichever is less): 22         Visit # Insurance Allowable Auth Required   1 90 VPCY []  Yes [x]  No        OUTCOME MEASURE DATE DEFICIT   UEFI 21 IE 7% Deficit                Latex Allergy:  [x]NO      []YES  Preferred Language for Healthcare:   [x]English       []other:      Pain level:  0-6/10     SUBJECTIVE:  See eval    OBJECTIVE:       21  ROM PROM AROM Comments     Left Right Left Right     Elbow flexion     150 142     Elbow extension     +1 -14     Pronation     80 70     Supination     90 80     Wrist flexion             Wrist extension             Wrist radial deviation             Wrist ulnar deviation                21 deferred  Special Tests Results/Comment   Elbow     Elbow flexion test     Pressure provocation test     Percussion/Tinel's Sign     Moving Valgus Stress Test     Cozen's           Wrist     Piano Key Test     Press Test     Supination Lift Test     Phalen's     Percussion     Bryant's Scaphoid Test [place] Munch Test                    21  Strength Left Right Comments   Elbow flexion 5 5     Elbow extension 5 5     Pronation         Supination         Wrist flexion 4+ 4+     Wrist extension 4+ 4+     Wrist radial deviation         Wrist ulnar deviation            12/20/21    Left Right Comments   Level 1         Level 2         Level 3         Level 4         Level 5         other     Screened - patient  R to L WNL and no noticeable difference and no pain      Reflexes/Sensation:               [x]? Dermatomes/Myotomes intact               [x]? Reflexes equal and normal bilaterally              []?Other:     Joint mobility:               [x]? Normal                      []?Hypo              []?Hyper     Palpation: mild TTP medial epicondyle of right elbow and into flexor muscle mass     Functional Mobility/Transfers: ind     Posture: WNL     Bandages/Dressings/Incisions: n/a     Gait: (include devices/WB status): WNL     Orthopedic Special Tests: see above. RESTRICTIONS/PRECAUTIONS: HTN; OA  Exercises/Interventions:   ROM/STRETCHES  COMMENTS   Wrist flexor 3x30\"    Wrist extensor 3x30\"    Elbow flexion 10x10\"    Elbow extension 10x10\"    Pronation / supination 10x10\" each         PRES     Wrist flexion 3x10; 3#    Wrist extension 3x10; 3#    Wrist radial deviation     Wrist ulnar deviation     Pronation/supination     Bicep 3x10; 5#    Tricep      Finger extension     Gripping           Manual interventions            Patient Education:   12/20/21: Patient educated about PT diagnosis, prognosis, and plan of care; educated on role of physical therapy; educated on HEP; educated on anatomy of elbow joint; discussed focusing on stretching particularly if stiffness returns. HEP:  Patient instructed on HEP on this date with handout provided and all questions answered. Discussions about how to progress sets/reps/resistance as necessary for fatigue and challenge. Patient was instructed to contact PT with any questions or concerns about HEP moving forward. Patient verbally stated she/he understood. Access Code: [email or web address]  URL: [email or web address]  Date: 12/20/2021  Prepared by: [employer]    Exercises  Supported Elbow Flexion Extension PROM - 1 x daily - 7 x weekly - 10 reps - 10 hold  Elbow Extension PROM - 1 x daily - 7 x weekly - 10 reps - 10 hold  Wrist Pronation Stretch - 1 x daily - 7 x weekly - 10 reps - 10 hold  Seated Wrist Supination Stretch - 1 x daily - 7 x weekly - 10 reps - 10 hold  Seated Wrist Extension Stretch - 1 x daily - 7 x weekly - 3 sets - 30 hold  Standing Wrist Extensor Stretch with Arm Straight - 1 x daily - 7 x weekly - 3 sets - 30 hold  Seated Wrist Flexion with Dumbbell - 1 x daily - 7 x weekly - 3 sets - 10 reps  Seated Wrist Extension with Dumbbell - 1 x daily - 7 x weekly - 3 sets - 10 reps  Seated Bicep Curls Supinated with Dumbbells - 1 x daily - 7 x weekly - 3 sets - 10 reps      Therapeutic Exercise and NMR EXR  [x] (00799) Provided verbal/tactile cueing for activities related to strengthening, flexibility, endurance, ROM  for improvements in scapular, scapulothoracic and UE control with self care, reaching, carrying, lifting, house/yardwork, driving/computer work.    [] (43203) Provided verbal/tactile cueing for activities related to improving balance, coordination, kinesthetic sense, posture, motor skill, proprioception  to assist with  scapular, scapulothoracic and UE control with self care, reaching, carrying, lifting, house/yardwork, driving/computer work. Therapeutic Activities:    [x] (45755 or 18666) Provided verbal/tactile cueing for activities related to improving balance, coordination, kinesthetic sense, posture, motor skill, proprioception and motor activation to allow for proper function of scapular, scapulothoracic and UE control with self care, carrying, lifting, driving/computer work.      Home Exercise Program:    [x] (42669) Reviewed/Progressed HEP activities related to strengthening, flexibility, endurance, ROM of scapular, scapulothoracic and UE control with self care, reaching, carrying, lifting, house/yardwork, driving/computer work  [] (57782) Reviewed/Progressed HEP activities related to improving balance, coordination, kinesthetic sense, posture, motor skill, proprioception of scapular, scapulothoracic and UE control with self care, reaching, carrying, lifting, house/yardwork, driving/computer work      Manual Treatments:  PROM / STM / Oscillations-Mobs:  G-I, II, III, IV (PA's, Inf., Post.)  [x] (12097) Provided manual therapy to mobilize soft tissue/joints of cervical/CT, scapular GHJ and UE for the purpose of modulating pain, promoting relaxation,  increasing ROM, reducing/eliminating soft tissue swelling/inflammation/restriction, improving soft tissue extensibility and allowing for proper ROM for normal function with self care, reaching, carrying, lifting, house/yardwork, driving/computer work    Modalities: None; discussed heat versus ice     Charges:  Timed Code Treatment Minutes: 30   Total Treatment Minutes: 50       [x] EVAL (LOW) 51769 (typically 20 minutes face-to-face)  [] EVAL (MOD) 32829 (typically 30 minutes face-to-face)  [] EVAL (HIGH) 46603 (typically 45 minutes face-to-face)  [] RE-EVAL     [x] IN(41582) x 1    [] IONTO  [] NMR (37934) x     [] VASO  [] Manual (25755) x      [] Other:  [x] TA x 1     [] Mech Traction (61077)  [] ES(attended) (52680)      [] ES (un) (73508):     GOALS:   Patient stated goal: maintain ROM    [] Progressing: [] Met: [] Not Met: [] Adjusted    Therapist goals for Patient:   Short Term Goals: To be achieved in: 2 weeks  1. Independent in HEP and progression per patient tolerance, in order to prevent re-injury. [] Progressing: [] Met: [] Not Met: [] Adjusted   2. Patient will have a decrease in pain to facilitate improvement in movement, function, and ADLs as indicated by Functional Deficits.     [] Progressing: [] Met: [] Not Met: [] Adjusted    Long Discharge      Electronically signed by:  Laura Cartwright, PT, DPT, OCS    Note: If patient does not return for scheduled/ recommended follow up visits, this note will serve as a discharge from care along with most recent update on progress.

## 2021-12-20 NOTE — PLAN OF CARE
Russel Eckert [address]                                                       Physical Therapy Certification    Dear Referring Practitioner: Dr. Herman Jose,    We had the pleasure of evaluating the following patient for physical therapy services at [address] [place] [address]. A summary of our findings can be found in the initial assessment below. This includes our plan of care. If you have any questions or concerns regarding these findings, please do not hesitate to contact me at the office phone number checked above. Thank you for the referral.       Physician Signature:_______________________________Date:__________________  By signing above (or electronic signature), therapists plan is approved by physician      Patient: Felipe Dangelo   : 1955   MRN: [id number]  Referring Physician: Referring Practitioner: Dr. Herman Jose      Evaluation Date: 2021      Medical Diagnosis Information:  Diagnosis: M19.021 (ICD-10-CM) - Arthritis of right elbow   Treatment Diagnosis: M25.521 R Elbow Pain                                         Insurance information: PT Insurance Information: [employer]; 80 V[employer]; no auth; no copay    Precautions/ Contra-indications: HTN; OA;     C-SSRS Triggered by Intake questionnaire (Past 2 wk assessment):   [x] No, Questionnaire did not trigger screening.   [] Yes, Patient intake triggered further evaluation      [] C-SSRS Screening completed  [] PCP notified via Plan of Care  [] Emergency services notified     Latex Allergy:  [x]NO      []YES  Preferred Language for Healthcare:   [x]English       []other:    SUBJECTIVE: Patient stated complaint: 74year old male presents to PT with right elbow pain. About 3 weeks ago was handing his wife a magazine and felt a pop in his right elbow and had significant pain. In the coming days he had difficulty bending his elbow and doing things such as washing his hair.  Also has a hard time getting his elbow all the way straight. Got a cortisone shot last week which has significantly helped improve his symptoms. No prior history of right elbow issues or pain. X-rays showed loose body and OA in elbow joint. Relevant Medical History: Left shoulder replacement Lexi Elm); both knees replaced;    Functional Disability Index:   OUTCOME MEASURE DATE DEFICIT   UEFI 12/20/21 IE 7% Deficit          Pain Scale: 0/10 at rest; 2/10 at worst since injection; 6/10 prior to injection  Easing factors: Injection; ice (initially)  Provocative factors: Bending (prior to injection)     Type: []Constant   [x]Intermittent  []Radiating [x]Localized []other:     Numbness/Tingling: None    Occupation/School:  Retired;     Living Status/Prior Level of Function: Independent with ADLs and IADLs; enjoys walking for fitness; enjoys working in his garage on car; has 11year old grandson    OBJECTIVE:     12/20/21  ROM PROM AROM Comments    Left Right Left Right    Elbow flexion   150 142    Elbow extension   +1 -14    Pronation   80 70    Supination   90 80    Wrist flexion        Wrist extension        Wrist radial deviation        Wrist ulnar deviation          12/20/21 deferred  Special Tests Results/Comment   Elbow    Elbow flexion test    Pressure provocation test    Percussion/Tinel's Sign    Moving Valgus Stress Test    Cozen's        Wrist    Piano Key Test    Press Test    Supination Lift Test    Phalen's    Percussion    Bryant's Scaphoid Test    [facility] Test              12/20/21  Strength Left Right Comments   Elbow flexion 5 5    Elbow extension 5 5    Pronation      Supination      Wrist flexion 4+ 4+    Wrist extension 4+ 4+    Wrist radial deviation      Wrist ulnar deviation        12/20/21    Left Right Comments   Level 1      Level 2      Level 3      Level 4      Level 5      other   Screened - patient  R to L WNL and no noticeable difference and no pain     Reflexes/Sensation:    [x]Dermatomes/Myotomes intact [x]Reflexes equal and normal bilaterally   []Other:    Joint mobility:    [x]Normal    []Hypo   []Hyper    Palpation: mild TTP medial epicondyle of right elbow and into flexor muscle mass    Functional Mobility/Transfers: ind    Posture: WNL    Bandages/Dressings/Incisions: n/a    Gait: (include devices/WB status): WNL    Orthopedic Special Tests: see above. [x] Patient history, allergies, meds reviewed. Medical chart reviewed. See intake form. Review Of Systems (ROS):  [x]Performed Review of systems (Integumentary, CardioPulmonary, Neurological) by intake and observation. Intake form has been scanned into medical record. Patient has been instructed to contact their primary care physician regarding ROS issues if not already being addressed at this time.       Co-morbidities/Complexities (which will affect course of rehabilitation):   []None           Arthritic conditions   []Rheumatoid arthritis (M05.9)  [x]Osteoarthritis (M19.91)   Cardiovascular conditions   [x]Hypertension (I10)  []Hyperlipidemia (E78.5)  []Angina pectoris (I20)  []Atherosclerosis (I70)   Musculoskeletal conditions   []Disc pathology   []Congenital spine pathologies   []Prior surgical intervention  []Osteoporosis (M81.8)  []Osteopenia (M85.8)   Endocrine conditions   []Hypothyroid (E03.9)  []Hyperthyroid Gastrointestinal conditions   []Constipation (L00.28)   Metabolic conditions   []Morbid obesity (E66.01)  []Diabetes type 1(E10.65) or 2 (E11.65)   []Neuropathy (G60.9)     Pulmonary conditions   []Asthma (J45)  []Coughing   []COPD (J44.9)   Psychological Disorders  []Anxiety (F41.9)  []Depression (F32.9)   []Other:   []Other:          Barriers to/and or personal factors that will affect rehab potential:              [x]Age  []Sex              [x]Motivation/Lack of Motivation                        []Co-Morbidities              []Cognitive Function, education/learning barriers              []Environmental, home barriers              []profession/work barriers  [x]past PT/medical experience  []other:  Justification: patient is in good health and has had PT before for past knee and shoulder surgeries so understands expectations    Falls Risk Assessment (30 days):   [x] Falls Risk assessed and no intervention required. [] Falls Risk assessed and Patient requires intervention due to being higher risk   TUG score (>12s at risk):     [] Falls education provided, including       G-Codes:     OUTCOME MEASURE DATE DEFICIT   UEFI 12/20/21 IE 7% Deficit            ASSESSMENT:   Functional Impairments   [x]Noted spinal or UE joint hypomobility   []Noted spinal or UE joint hypermobility   [x]Decreased UE functional ROM   [x]Decreased UE functional strength   []Abnormal reflexes/sensation/myotomal/dermatomal deficits   [x]Decreased RC/scapular/core strength and neuromuscular control   []other:      Functional Activity Limitations (from functional questionnaire and intake)   []Reduced ability to tolerate prolonged functional positions   [x]Reduced ability or difficulty with changes of positions or transfers between positions   [x]Reduced ability to maintain good posture and demonstrate good body mechanics with sitting, bending, and lifting   [] Reduced ability or tolerance with driving and/or computer work   []Reduced ability to sleep   [x]Reduced ability to perform lifting, reaching, carrying tasks   [x]Reduced ability to tolerate impact through UE   []Reduced ability to reach behind back   [x]Reduced ability to  or hold objects   [x]Reduced ability to throw or toss an object   []other:      Participation Restrictions   [x]Reduced participation in self care activities   [x]Reduced participation in home management activities   []Reduced participation in work activities   [x]Reduced participation in social activities. []Reduced participation in sport / recreational activities.     Classification:   []Signs/symptoms consistent with post-surgical status including decreased ROM, strength and function. []Signs/symptoms consistent with joint sprain/strain   []Signs/symptoms consistent with shoulder impingement   []Signs/symptoms consistent with shoulder/elbow/wrist tendinopathy   []Signs/symptoms consistent with Rotator cuff tear   []Signs/symptoms consistent with labral tear   []Signs/symptoms consistent with postural dysfunction    []Signs/symptoms consistent with Glenohumeral IR Deficit - <45 degrees   []Signs/symptoms consistent with facet dysfunction of cervical/thoracic spine    []Signs/symptoms consistent with pathology which may benefit from Dry needling     [x]other: Elbow OA / loose body    Prognosis/Rehab Potential:      []Excellent   [x]Good    []Fair   []Poor    Tolerance of evaluation/treatment:    []Excellent   [x]Good    []Fair   []Poor    Physical Therapy Evaluation Complexity Justification  [x] A history of present problem with:  [] no personal factors and/or comorbidities that impact the plan of care;  [x]1-2 personal factors and/or comorbidities that impact the plan of care  []3 personal factors and/or comorbidities that impact the plan of care  [x] An examination of body systems using standardized tests and measures addressing any of the following: body structures and functions (impairments), activity limitations, and/or participation restrictions;:  [] a total of 1-2 or more elements   [] a total of 3 or more elements   [x] a total of 4 or more elements   [x] A clinical presentation with:  [x] stable and/or uncomplicated characteristics   [] evolving clinical presentation with changing characteristics  [] unstable and unpredictable characteristics;   [x] Clinical decision making of [x] low, [] moderate, [] high complexity using standardized patient assessment instrument and/or measurable assessment of functional outcome.     [x] EVAL (LOW) 08984 (typically 20 minutes face-to-face)  [] EVAL (MOD) 16508 (typically 30 minutes face-to-face)  [] EVAL (HIGH) 55461 (typically 45 minutes face-to-face)  [] RE-EVAL       PLAN:  Frequency/Duration:  1 days per week for 4 Weeks:  INTERVENTIONS:  [x] Therapeutic exercise including: strength training, ROM, for Upper extremity and core   [x]  NMR activation and proprioception for UE, scap and Core   [x] Manual therapy as indicated for shoulder, scapula and spine to include: Dry Needling/IASTM, STM, PROM, Gr I-IV mobilizations, manipulation. [x] Modalities as needed that may include: thermal agents, E-stim, Biofeedback, US, iontophoresis as indicated  [x] Patient education on joint protection, postural re-education, activity modification, progression of HEP. HEP instruction:   Patient instructed on HEP on this date with handout provided and all questions answered. Discussions about how to progress sets/reps/resistance as necessary for fatigue and challenge. Patient was instructed to contact PT with any questions or concerns about HEP moving forward. Patient verbally stated she/he understood. Access Code: [email or web address]  URL: [email or web address]  Date: 12/20/2021  Prepared by:  [employer]    Exercises  Supported Elbow Flexion Extension PROM - 1 x daily - 7 x weekly - 10 reps - 10 hold  Elbow Extension PROM - 1 x daily - 7 x weekly - 10 reps - 10 hold  Wrist Pronation Stretch - 1 x daily - 7 x weekly - 10 reps - 10 hold  Seated Wrist Supination Stretch - 1 x daily - 7 x weekly - 10 reps - 10 hold  Seated Wrist Extension Stretch - 1 x daily - 7 x weekly - 3 sets - 30 hold  Standing Wrist Extensor Stretch with Arm Straight - 1 x daily - 7 x weekly - 3 sets - 30 hold  Seated Wrist Flexion with Dumbbell - 1 x daily - 7 x weekly - 3 sets - 10 reps  Seated Wrist Extension with Dumbbell - 1 x daily - 7 x weekly - 3 sets - 10 reps  Seated Bicep Curls Supinated with Dumbbells - 1 x daily - 7 x weekly - 3 sets - 10 reps        GOALS:   Patient stated goal: maintain ROM    [] Progressing: [] Met: [] Not Met: [] Adjusted    Therapist goals for Patient:   Short Term Goals: To be achieved in: 2 weeks  1. Independent in HEP and progression per patient tolerance, in order to prevent re-injury. [] Progressing: [] Met: [] Not Met: [] Adjusted   2. Patient will have a decrease in pain to facilitate improvement in movement, function, and ADLs as indicated by Functional Deficits. [] Progressing: [] Met: [] Not Met: [] Adjusted    Long Term Goals: To be achieved in: 4 weeks  1. Disability index score of 0% or less for the UEFS to assist with reaching prior level of function. [] Progressing: [] Met: [] Not Met: [] Adjusted  2. Patient will demonstrate increased AROM to -5 to 145 right elbow flexion/extension to allow for proper joint functioning as indicated by patients Functional Deficits. [] Progressing: [] Met: [] Not Met: [] Adjusted  3. Patient will demonstrate an increase in strength to 5/5 in BUE to allow for proper functional mobility as indicated by patients Functional Deficits. [] Progressing: [] Met: [] Not Met: [] Adjusted  4. Patient will return to ADL and other functional activities without increased symptoms or restriction. [] Progressing: [] Met: [] Not Met: [] Adjusted  5. Patient will be able to work on his car without pain or limitation in right elbow (patient specific functional goal)    [] Progressing: [] Met: [] Not Met: [] Adjusted    Electronically signed by:  Megan Rendon, PT, DPT, OCS    Note: If patient does not return for scheduled/ recommended follow up visits, this note will serve as a discharge from care along with most recent update on progress.

## 2021-12-28 ENCOUNTER — HOSPITAL ENCOUNTER (OUTPATIENT)
Dept: PHYSICAL THERAPY | Age: 66
Setting detail: THERAPIES SERIES
Discharge: HOME | End: 2021-12-28
Payer: COMMERCIAL

## 2021-12-28 PROCEDURE — 97530 THERAPEUTIC ACTIVITIES: CPT

## 2021-12-28 PROCEDURE — 97110 THERAPEUTIC EXERCISES: CPT

## 2021-12-28 NOTE — DISCHARGE SUMMARY
[facility] [address]    Physical Therapy Discharge Note  Date:  2021    Patient Name:  Piotr Moser    :  1955  MRN: [id number]  Restrictions/Precautions:    Medical/Treatment Diagnosis Information:  · Diagnosis: M19.021 (ICD-10-CM) - Arthritis of right elbow  · Treatment Diagnosis: M25.521 R Elbow Pain  Insurance/Certification information:  PT Insurance Information: [employer]; 80 VPCY; no auth; no copay  Physician Information:  Referring Practitioner: Dr. Lalit Mckeon  Has the plan of care been signed (Y/N):        [x]  Yes  []  No     Date of Patient follow up with Physician: 22      Is this a Progress Report:     []  Yes  [x]  No        If Yes:  Date Range for reporting period:  Beginnin21  Ending:    Progress report will be due (10 Rx or 30 days whichever is less): 27       Recertification will be due (POC Duration  / 90 days whichever is less): 22         Visit # Insurance Allowable Auth Required   2 90 VPCY []  Yes [x]  No        OUTCOME MEASURE DATE DEFICIT   UEFI 21 IE 7% Deficit                Latex Allergy:  [x]NO      []YES  Preferred Language for Healthcare:   [x]English       []other:      Pain level:  0/10 at rest     SUBJECTIVE:  Doing well today. No pain in elbow at rest. HEP going well. Still has felt near full relief of symptoms ever since his injection from Dr. Lalit Mckeon.      OBJECTIVE:       21  ROM PROM AROM Comments     Left Right Left Right     Elbow flexion     150 150     Elbow extension     +1 -5     Pronation     80 70     Supination     90 80     Wrist flexion             Wrist extension             Wrist radial deviation             Wrist ulnar deviation                21 deferred  Special Tests Results/Comment   Elbow     Elbow flexion test     Pressure provocation test     Percussion/Tinel's Sign     Moving Valgus Stress Test     Cozen's           Wrist     EchoStar Test     Press Test   Supination Lift Test     Phalen's     Percussion     Bryant's Scaphoid Test     Gema Moat Test                    12/20/21  Strength Left Right Comments   Elbow flexion 5 5     Elbow extension 5 5     Pronation         Supination         Wrist flexion 4+ 4+     Wrist extension 4+ 4+     Wrist radial deviation         Wrist ulnar deviation            12/20/21    Left Right Comments   Level 1         Level 2         Level 3         Level 4         Level 5         other     Screened - patient  R to L WNL and no noticeable difference and no pain      Reflexes/Sensation:               [x]? Dermatomes/Myotomes intact               [x]? Reflexes equal and normal bilaterally              []?Other:     Joint mobility:               [x]? Normal                      []?Hypo              []?Hyper     Palpation: mild TTP medial epicondyle of right elbow and into flexor muscle mass     Functional Mobility/Transfers: ind     Posture: WNL     Bandages/Dressings/Incisions: n/a     Gait: (include devices/WB status): WNL     Orthopedic Special Tests: see above. RESTRICTIONS/PRECAUTIONS: HTN; OA  Exercises/Interventions:   ROM/STRETCHES  COMMENTS   Wrist flexor 3x30\"    Wrist extensor 3x30\"    Elbow flexion 10x10\"    Elbow extension 10x10\"            PRES     Wrist flexion 3x10; 3#    Wrist extension 3x10; 3#    Wrist radial deviation     Wrist ulnar deviation     Pronation/supination 3x10; 3#    Bicep 3x10; 5#    Tricep Kick Back 3x10; 5#    Bent Over Row 3x10; 5#              Scapular Retraction 3x10; Blue    Shoulder Extension 3x10; Green                   Manual interventions            Patient Education:   12/20/21: Patient educated about PT diagnosis, prognosis, and plan of care; educated on role of physical therapy; educated on HEP; educated on anatomy of elbow joint; discussed focusing on stretching particularly if stiffness returns.     HEP:  Patient instructed on HEP on this date with handout provided and all questions answered. Discussions about how to progress sets/reps/resistance as necessary for fatigue and challenge. Patient was instructed to contact PT with any questions or concerns about HEP moving forward. Patient verbally stated she/he understood. Access Code: [email or web address]  URL: [email or web address]  Date: 12/28/2021  Prepared by:  [employer]    Exercises  Supported Elbow Flexion Extension PROM - 1 x daily - 7 x weekly - 10 reps - 10 hold  Elbow Extension PROM - 1 x daily - 7 x weekly - 10 reps - 10 hold  Seated Wrist Extension Stretch - 1 x daily - 7 x weekly - 3 sets - 30 hold  Standing Wrist Extensor Stretch with Arm Straight - 1 x daily - 7 x weekly - 3 sets - 30 hold  Seated Wrist Flexion with Dumbbell - 1 x daily - 3 x weekly - 3 sets - 10 reps  Seated Wrist Extension with Dumbbell - 1 x daily - 3 x weekly - 3 sets - 10 reps  Seated Pronation Supination with Dumbbell - 1 x daily - 3 x weekly - 3 sets - 10 reps  Seated Bicep Curls Supinated with Dumbbells - 1 x daily - 3 x weekly - 3 sets - 10 reps  Bent Over Tricep Extension with Counter Support - 1 x daily - 3 x weekly - 3 sets - 10 reps  Bent Over Single Arm Shoulder Row with Dumbbell - 1 x daily - 3 x weekly - 3 sets - 10 reps  Scapular Retraction with Resistance - 1 x daily - 3 x weekly - 3 sets - 10 reps  Shoulder Extension with Resistance - 1 x daily - 3 x weekly - 3 sets - 10 reps        Therapeutic Exercise and NMR EXR  [x] (89644) Provided verbal/tactile cueing for activities related to strengthening, flexibility, endurance, ROM  for improvements in scapular, scapulothoracic and UE control with self care, reaching, carrying, lifting, house/yardwork, driving/computer work.    [] (99925) Provided verbal/tactile cueing for activities related to improving balance, coordination, kinesthetic sense, posture, motor skill, proprioception  to assist with  scapular, scapulothoracic and UE control with self care, reaching, carrying, lifting, house/yardwork, driving/computer work. Therapeutic Activities:    [x] (75682 or 70085) Provided verbal/tactile cueing for activities related to improving balance, coordination, kinesthetic sense, posture, motor skill, proprioception and motor activation to allow for proper function of scapular, scapulothoracic and UE control with self care, carrying, lifting, driving/computer work.      Home Exercise Program:    [x] (72615) Reviewed/Progressed HEP activities related to strengthening, flexibility, endurance, ROM of scapular, scapulothoracic and UE control with self care, reaching, carrying, lifting, house/yardwork, driving/computer work  [] (06738) Reviewed/Progressed HEP activities related to improving balance, coordination, kinesthetic sense, posture, motor skill, proprioception of scapular, scapulothoracic and UE control with self care, reaching, carrying, lifting, house/yardwork, driving/computer work      Manual Treatments:  PROM / STM / Oscillations-Mobs:  G-I, II, III, IV (PA's, Inf., Post.)  [x] (75847) Provided manual therapy to mobilize soft tissue/joints of cervical/CT, scapular GHJ and UE for the purpose of modulating pain, promoting relaxation,  increasing ROM, reducing/eliminating soft tissue swelling/inflammation/restriction, improving soft tissue extensibility and allowing for proper ROM for normal function with self care, reaching, carrying, lifting, house/yardwork, driving/computer work    Modalities: None; discussed heat versus ice     Charges:  Timed Code Treatment Minutes: 40   Total Treatment Minutes: 40       [] EVAL (LOW) 77977 (typically 20 minutes face-to-face)  [] EVAL (MOD) 82936 (typically 30 minutes face-to-face)  [] EVAL (HIGH) 25997 (typically 45 minutes face-to-face)  [] RE-EVAL     [x] VT(70758) x 2    [] IONTO  [] NMR (27806) x     [] VASO  [] Manual (07006) x      [] Other:  [x] TA x 1     [] Mech Traction (53090)  [] ES(attended) (34633)      [] ES (un) (03339):     GOALS: Patient stated goal: maintain ROM    [] Progressing: [] Met: [] Not Met: [] Adjusted    Therapist goals for Patient:   Short Term Goals: To be achieved in: 2 weeks  1. Independent in HEP and progression per patient tolerance, in order to prevent re-injury. [] Progressing: [] Met: [] Not Met: [] Adjusted   2. Patient will have a decrease in pain to facilitate improvement in movement, function, and ADLs as indicated by Functional Deficits. [] Progressing: [] Met: [] Not Met: [] Adjusted    Long Term Goals: To be achieved in: 4 weeks  1. Disability index score of 0% or less for the UEFS to assist with reaching prior level of function. [] Progressing: [] Met: [] Not Met: [] Adjusted  2. Patient will demonstrate increased AROM to -5 to 145 right elbow flexion/extension to allow for proper joint functioning as indicated by patients Functional Deficits. [] Progressing: [] Met: [] Not Met: [] Adjusted  3. Patient will demonstrate an increase in strength to 5/5 in BUE to allow for proper functional mobility as indicated by patients Functional Deficits. [] Progressing: [] Met: [] Not Met: [] Adjusted  4. Patient will return to ADL and other functional activities without increased symptoms or restriction. [] Progressing: [] Met: [] Not Met: [] Adjusted  5. Patient will be able to work on his car without pain or limitation in right elbow (patient specific functional goal)    [] Progressing: [] Met: [] Not Met: [] Adjusted      Overall Progression Towards Functional goals/ Treatment Progress Update:  [] Patient is progressing as expected towards functional goals listed. [] Progression is slowed due to complexities/Impairments listed. [] Progression has been slowed due to co-morbidities.   [x] Plan just implemented, too soon to assess goals progression <30days   [] Goals require adjustment due to lack of progress  [] Patient is not progressing as expected and requires additional follow up with physician  [] Other    Prognosis for POC: [x] Good [] Fair  [] Poor      Patient requires continued skilled intervention: [x] Yes  [] No    Treatment/Activity Tolerance:  [x] Patient able to complete treatment  [] Patient limited by fatigue  [] Patient limited by pain     [] Patient limited by other medical complications  [] Other: Tolerated visit well today. ROM improved from a week ago and lacks only slight extension at elbow. No pain with program today. Progressed strength exercises including some general shoulder strengthening. Cues needed for scapular depression and retraction during rows. Patient has demonstrated a consistent understanding of their HEP and how to progress themself independently. Patient is to be discharged from PT as skilled care is no longer needed. Patient is instructed to call PT with any questions or concerns moving forward. Patient is agreeable with this plan. PLAN: See eval  [] Continue per plan of care [] Alter current plan (see comments above)  [] Plan of care initiated [] Hold pending MD visit [x] Discharge      Electronically signed by:  Nasim Delgado, PT, DPT, OCS    Note: If patient does not return for scheduled/ recommended follow up visits, this note will serve as a discharge from care along with most recent update on progress.

## 2022-01-26 ENCOUNTER — OFFICE VISIT (OUTPATIENT)
Dept: ORTHOPEDIC SURGERY | Age: 67
End: 2022-01-26
Payer: COMMERCIAL

## 2022-01-26 VITALS — WEIGHT: 174 LBS | BODY MASS INDEX: 26.37 KG/M2 | HEIGHT: 68 IN

## 2022-01-26 DIAGNOSIS — M19.021 ARTHRITIS OF RIGHT ELBOW: Primary | ICD-10-CM

## 2022-01-26 DIAGNOSIS — M25.521 RIGHT ELBOW PAIN: ICD-10-CM

## 2022-01-26 DIAGNOSIS — M19.011 PRIMARY OSTEOARTHRITIS OF RIGHT SHOULDER: ICD-10-CM

## 2022-01-26 PROBLEM — L30.1 DYSHIDROTIC ECZEMA: Status: ACTIVE | Noted: 2020-12-24

## 2022-01-26 PROCEDURE — 99213 OFFICE O/P EST LOW 20 MIN: CPT | Performed by: ORTHOPAEDIC SURGERY

## 2022-01-26 NOTE — PROGRESS NOTES
Chief Complaint    Elbow Pain (Right Elbow)      History of Present Illness:  Razia Hall is a pleasant, 74 y.o., male, here today for follow up of right elbow. We performed an intra-articular corticosteroid injection for right elbow osteoarthritis. Coupled with the injection we recommended formal physical therapy. He was able to do this at the [employer] working with [place] and has obtained a home-based program. Overall he has noticed a significant improvement in symptoms and function. He has very little pain. He complains of occasional catching. He reports no new injuries or setbacks. Pain Assessment  Location of Pain: Elbow  Location Modifiers: Right  Severity of Pain: 0  Quality of Pain: Aching  Limiting Behavior: No  Relieving Factors: Rest  Result of Injury: No  Work-Related Injury: No  Are there other pain locations you wish to document?: No      Medical History:  Patient's medications, allergies, past medical, surgical, social and family histories were reviewed and updated as appropriate. Review of Systems  A 14 point review of systems was completed by the patient on 9/29/21 and is available in the media section of the scanned medical record and was reviewed on 1/26/2022. The review is negative with the exception of those things mentioned in the HPI and Past Medical History    Vital Signs: There were no vitals filed for this visit. General/Appearance: Alert and oriented and in no apparent distress. Skin:  There are no skin lesions, cellulitis, or extreme edema. The patient has warm and well-perfused Bilateral upper extremities with brisk capillary refill. Right Elbow Exam:    Inspection:  No skin lesions, no deformity, no swelling or signs of infection. Palpation:  No tenderness to palpation over the mobile wad, flexor pronator mass, biceps, triceps.      Range of Motion: Lacks 5 degrees of full extension, 120 degrees of flexion, normal supination and pronation with the elbow at 90°    Strength: Deferred    Stability:  No instability noted to varus and valgus stress, no rotatory instability with pushup test    Neurovascular: Palpable radial pulse, normal sensation in the median, ulnar, radial nerve distributions    Special Tests:  Deferred       Radiology:     No new XR obtained at this time. Assessment :  Mr. Charles Cornelius is a pleasant, 74 y.o. patient with right elbow osteoarthritis. He has responded very well to conservative treatment. Impression:  Encounter Diagnoses   Name Primary?  Arthritis of right elbow Yes    Primary osteoarthritis of right shoulder     Right elbow pain        Office Procedures:  No orders of the defined types were placed in this encounter. Treatment Plan:  Charles Cornelius has responded well to conservative management. We explained we have the option to repeat the injection every 3 months as needed, however its best to space these out when possible. We recommend this patient continue his home exercises as a long-term maintenance program. He may at some point be facing an scope to clean out the elbow, however we will exhaust non-operative treatment first.     We will see Jelena Sow back as needed. All questions were answered to patient's satisfaction and He was encouraged to call with any further questions or concerns. Helene Glenis is in agreement with this plan. 1/26/2022  9:39 AM    Louie Pabon ATC  [facility] [address] [facility]    During this examination, I, Vale Schuster, functioned as a scribe for Dr. Porter Rebollar. The history taking and physical examination were performed by Dr. Lyla Neff. All counseling during the appointment was performed between the patient and Dr. Lyla Neff.  1/26/22  ______________  I, Dr. Porter Rebollar, personally performed the services described in this documentation as described by Louie Pabon ATC in my presence, and it is both accurate and

## 2022-05-04 ENCOUNTER — HOSPITAL ENCOUNTER (INPATIENT)
Age: 67
LOS: 1 days | Discharge: HOME | DRG: 694 | End: 2022-05-05
Attending: STUDENT IN AN ORGANIZED HEALTH CARE EDUCATION/TRAINING PROGRAM | Admitting: STUDENT IN AN ORGANIZED HEALTH CARE EDUCATION/TRAINING PROGRAM
Payer: MEDICARE

## 2022-05-04 ENCOUNTER — APPOINTMENT (OUTPATIENT)
Dept: CT IMAGING | Age: 67
DRG: 694 | End: 2022-05-04
Payer: MEDICARE

## 2022-05-04 DIAGNOSIS — N20.1 URETEROLITHIASIS: Primary | ICD-10-CM

## 2022-05-04 DIAGNOSIS — R52 INTRACTABLE PAIN: ICD-10-CM

## 2022-05-04 PROBLEM — N13.5 URETERAL OBSTRUCTION, RIGHT: Status: ACTIVE | Noted: 2022-05-04

## 2022-05-04 LAB
A/G RATIO: 1.3 (ref 1.1–2.2)
ALBUMIN SERPL-MCNC: 4.6 G/DL (ref 3.4–5)
ALP BLD-CCNC: 145 U/L (ref 40–129)
ALT SERPL-CCNC: 29 U/L (ref 10–40)
ANION GAP SERPL CALCULATED.3IONS-SCNC: 15 MMOL/L (ref 3–16)
AST SERPL-CCNC: 27 U/L (ref 15–37)
BACTERIA: ABNORMAL /HPF
BASOPHILS ABSOLUTE: 0.1 K/UL (ref 0–0.2)
BASOPHILS RELATIVE PERCENT: 0.7 %
BILIRUB SERPL-MCNC: 0.9 MG/DL (ref 0–1)
BILIRUBIN URINE: NEGATIVE
BLOOD, URINE: ABNORMAL
BUN BLDV-MCNC: 13 MG/DL (ref 7–20)
CALCIUM SERPL-MCNC: 9.5 MG/DL (ref 8.3–10.6)
CHLORIDE BLD-SCNC: 98 MMOL/L (ref 99–110)
CLARITY: CLEAR
CO2: 20 MMOL/L (ref 21–32)
COLOR: ABNORMAL
CREAT SERPL-MCNC: 0.9 MG/DL (ref 0.8–1.3)
EOSINOPHILS ABSOLUTE: 0.1 K/UL (ref 0–0.6)
EOSINOPHILS RELATIVE PERCENT: 0.9 %
EPITHELIAL CELLS, UA: 0 /HPF (ref 0–5)
GFR AFRICAN AMERICAN: >60
GFR NON-AFRICAN AMERICAN: >60
GLUCOSE BLD-MCNC: 98 MG/DL (ref 70–99)
GLUCOSE URINE: NEGATIVE MG/DL
HCT VFR BLD CALC: 45.8 % (ref 40.5–52.5)
HEMOGLOBIN: 15.5 G/DL (ref 13.5–17.5)
HYALINE CASTS: 0 /LPF (ref 0–8)
KETONES, URINE: NEGATIVE MG/DL
LACTIC ACID: 1 MMOL/L (ref 0.4–2)
LEUKOCYTE ESTERASE, URINE: NEGATIVE
LIPASE: 33 U/L (ref 13–60)
LYMPHOCYTES ABSOLUTE: 1.6 K/UL (ref 1–5.1)
LYMPHOCYTES RELATIVE PERCENT: 14.2 %
MCH RBC QN AUTO: 31.1 PG (ref 26–34)
MCHC RBC AUTO-ENTMCNC: 33.9 G/DL (ref 31–36)
MCV RBC AUTO: 91.8 FL (ref 80–100)
MICROSCOPIC EXAMINATION: YES
MONOCYTES ABSOLUTE: 1.5 K/UL (ref 0–1.3)
MONOCYTES RELATIVE PERCENT: 13.8 %
NEUTROPHILS ABSOLUTE: 7.8 K/UL (ref 1.7–7.7)
NEUTROPHILS RELATIVE PERCENT: 70.4 %
NITRITE, URINE: NEGATIVE
PDW BLD-RTO: 13.3 % (ref 12.4–15.4)
PH UA: 6 (ref 5–8)
PLATELET # BLD: 247 K/UL (ref 135–450)
PMV BLD AUTO: 7.8 FL (ref 5–10.5)
POTASSIUM SERPL-SCNC: 3.5 MMOL/L (ref 3.5–5.1)
PROTEIN UA: NEGATIVE MG/DL
RBC # BLD: 4.99 M/UL (ref 4.2–5.9)
RBC UA: 5 /HPF (ref 0–4)
SODIUM BLD-SCNC: 133 MMOL/L (ref 136–145)
SPECIFIC GRAVITY UA: <=1.005 (ref 1–1.03)
TOTAL PROTEIN: 8.2 G/DL (ref 6.4–8.2)
URINE REFLEX TO CULTURE: ABNORMAL
URINE TYPE: ABNORMAL
UROBILINOGEN, URINE: 0.2 E.U./DL
WBC # BLD: 11 K/UL (ref 4–11)
WBC UA: 0 /HPF (ref 0–5)

## 2022-05-04 PROCEDURE — 2580000003 HC RX 258: Performed by: NURSE PRACTITIONER

## 2022-05-04 PROCEDURE — 6370000000 HC RX 637 (ALT 250 FOR IP): Performed by: NURSE PRACTITIONER

## 2022-05-04 PROCEDURE — 83605 ASSAY OF LACTIC ACID: CPT

## 2022-05-04 PROCEDURE — 36415 COLL VENOUS BLD VENIPUNCTURE: CPT

## 2022-05-04 PROCEDURE — 6360000002 HC RX W HCPCS: Performed by: NURSE PRACTITIONER

## 2022-05-04 PROCEDURE — 6360000004 HC RX CONTRAST MEDICATION: Performed by: NURSE PRACTITIONER

## 2022-05-04 PROCEDURE — 1200000000 HC SEMI PRIVATE

## 2022-05-04 PROCEDURE — 74177 CT ABD & PELVIS W/CONTRAST: CPT

## 2022-05-04 PROCEDURE — 83690 ASSAY OF LIPASE: CPT

## 2022-05-04 PROCEDURE — 80053 COMPREHEN METABOLIC PANEL: CPT

## 2022-05-04 PROCEDURE — 99285 EMERGENCY DEPT VISIT HI MDM: CPT

## 2022-05-04 PROCEDURE — 81001 URINALYSIS AUTO W/SCOPE: CPT

## 2022-05-04 PROCEDURE — 85025 COMPLETE CBC W/AUTO DIFF WBC: CPT

## 2022-05-04 PROCEDURE — 96374 THER/PROPH/DIAG INJ IV PUSH: CPT

## 2022-05-04 RX ORDER — ACETAMINOPHEN 500 MG
1000 TABLET ORAL EVERY 8 HOURS PRN
Status: DISCONTINUED | OUTPATIENT
Start: 2022-05-04 | End: 2022-05-05 | Stop reason: HOSPADM

## 2022-05-04 RX ORDER — KETOROLAC TROMETHAMINE 30 MG/ML
15 INJECTION, SOLUTION INTRAMUSCULAR; INTRAVENOUS EVERY 6 HOURS PRN
Status: DISCONTINUED | OUTPATIENT
Start: 2022-05-04 | End: 2022-05-04

## 2022-05-04 RX ORDER — AMLODIPINE BESYLATE 10 MG/1
10 TABLET ORAL DAILY
Status: DISCONTINUED | OUTPATIENT
Start: 2022-05-05 | End: 2022-05-05 | Stop reason: HOSPADM

## 2022-05-04 RX ORDER — MORPHINE SULFATE 4 MG/ML
4 INJECTION, SOLUTION INTRAMUSCULAR; INTRAVENOUS ONCE
Status: COMPLETED | OUTPATIENT
Start: 2022-05-04 | End: 2022-05-04

## 2022-05-04 RX ORDER — ASCORBIC ACID 500 MG
500 TABLET ORAL 2 TIMES DAILY
COMMUNITY

## 2022-05-04 RX ORDER — ONDANSETRON 4 MG/1
4 TABLET, ORALLY DISINTEGRATING ORAL EVERY 8 HOURS PRN
Status: DISCONTINUED | OUTPATIENT
Start: 2022-05-04 | End: 2022-05-05 | Stop reason: HOSPADM

## 2022-05-04 RX ORDER — KETOROLAC TROMETHAMINE 30 MG/ML
15 INJECTION, SOLUTION INTRAMUSCULAR; INTRAVENOUS ONCE
Status: COMPLETED | OUTPATIENT
Start: 2022-05-04 | End: 2022-05-05

## 2022-05-04 RX ORDER — POLYETHYLENE GLYCOL 3350 17 G/17G
17 POWDER, FOR SOLUTION ORAL DAILY PRN
Status: DISCONTINUED | OUTPATIENT
Start: 2022-05-04 | End: 2022-05-05 | Stop reason: HOSPADM

## 2022-05-04 RX ORDER — TRAMADOL HYDROCHLORIDE 50 MG/1
50 TABLET ORAL EVERY 6 HOURS PRN
Status: DISCONTINUED | OUTPATIENT
Start: 2022-05-04 | End: 2022-05-05 | Stop reason: HOSPADM

## 2022-05-04 RX ORDER — ONDANSETRON 2 MG/ML
4 INJECTION INTRAMUSCULAR; INTRAVENOUS EVERY 6 HOURS PRN
Status: DISCONTINUED | OUTPATIENT
Start: 2022-05-04 | End: 2022-05-05 | Stop reason: HOSPADM

## 2022-05-04 RX ORDER — NALOXONE HYDROCHLORIDE 0.4 MG/ML
0.4 INJECTION, SOLUTION INTRAMUSCULAR; INTRAVENOUS; SUBCUTANEOUS PRN
Status: DISCONTINUED | OUTPATIENT
Start: 2022-05-04 | End: 2022-05-05 | Stop reason: HOSPADM

## 2022-05-04 RX ORDER — TAMSULOSIN HYDROCHLORIDE 0.4 MG/1
0.4 CAPSULE ORAL NIGHTLY
Status: DISCONTINUED | OUTPATIENT
Start: 2022-05-04 | End: 2022-05-05 | Stop reason: HOSPADM

## 2022-05-04 RX ORDER — ALBUTEROL SULFATE 2.5 MG/3ML
2.5 SOLUTION RESPIRATORY (INHALATION) EVERY 4 HOURS PRN
COMMUNITY

## 2022-05-04 RX ORDER — ACETAMINOPHEN 325 MG/1
650 TABLET ORAL
Status: DISCONTINUED | OUTPATIENT
Start: 2022-05-04 | End: 2022-05-04

## 2022-05-04 RX ORDER — SODIUM CHLORIDE 9 MG/ML
INJECTION, SOLUTION INTRAVENOUS PRN
Status: DISCONTINUED | OUTPATIENT
Start: 2022-05-04 | End: 2022-05-05 | Stop reason: HOSPADM

## 2022-05-04 RX ORDER — ENOXAPARIN SODIUM 100 MG/ML
40 INJECTION SUBCUTANEOUS DAILY
Status: DISCONTINUED | OUTPATIENT
Start: 2022-05-05 | End: 2022-05-05 | Stop reason: HOSPADM

## 2022-05-04 RX ORDER — ALBUTEROL SULFATE 2.5 MG/3ML
2.5 SOLUTION RESPIRATORY (INHALATION) EVERY 4 HOURS PRN
Status: DISCONTINUED | OUTPATIENT
Start: 2022-05-04 | End: 2022-05-05 | Stop reason: HOSPADM

## 2022-05-04 RX ORDER — SODIUM CHLORIDE 0.9 % (FLUSH) 0.9 %
5-40 SYRINGE (ML) INJECTION EVERY 12 HOURS SCHEDULED
Status: DISCONTINUED | OUTPATIENT
Start: 2022-05-04 | End: 2022-05-05 | Stop reason: HOSPADM

## 2022-05-04 RX ORDER — HYDROXYZINE HYDROCHLORIDE 25 MG/1
25 TABLET, FILM COATED ORAL NIGHTLY PRN
COMMUNITY

## 2022-05-04 RX ORDER — HYDROXYZINE HYDROCHLORIDE 25 MG/1
25 TABLET, FILM COATED ORAL NIGHTLY PRN
Status: DISCONTINUED | OUTPATIENT
Start: 2022-05-04 | End: 2022-05-05 | Stop reason: HOSPADM

## 2022-05-04 RX ORDER — SODIUM CHLORIDE, SODIUM LACTATE, POTASSIUM CHLORIDE, CALCIUM CHLORIDE 600; 310; 30; 20 MG/100ML; MG/100ML; MG/100ML; MG/100ML
INJECTION, SOLUTION INTRAVENOUS CONTINUOUS
Status: DISCONTINUED | OUTPATIENT
Start: 2022-05-04 | End: 2022-05-05 | Stop reason: HOSPADM

## 2022-05-04 RX ORDER — OMEGA-3/DHA/EPA/FISH OIL 60 MG-90MG
1 CAPSULE ORAL DAILY
COMMUNITY

## 2022-05-04 RX ORDER — SODIUM CHLORIDE 0.9 % (FLUSH) 0.9 %
5-40 SYRINGE (ML) INJECTION PRN
Status: DISCONTINUED | OUTPATIENT
Start: 2022-05-04 | End: 2022-05-05 | Stop reason: HOSPADM

## 2022-05-04 RX ORDER — SODIUM CHLORIDE 9 MG/ML
INJECTION, SOLUTION INTRAVENOUS CONTINUOUS
Status: DISCONTINUED | OUTPATIENT
Start: 2022-05-04 | End: 2022-05-05

## 2022-05-04 RX ORDER — TAMSULOSIN HYDROCHLORIDE 0.4 MG/1
0.4 CAPSULE ORAL ONCE
Status: COMPLETED | OUTPATIENT
Start: 2022-05-04 | End: 2022-05-04

## 2022-05-04 RX ORDER — 0.9 % SODIUM CHLORIDE 0.9 %
1000 INTRAVENOUS SOLUTION INTRAVENOUS ONCE
Status: COMPLETED | OUTPATIENT
Start: 2022-05-04 | End: 2022-05-04

## 2022-05-04 RX ORDER — VITAMIN E 268 MG
400 CAPSULE ORAL DAILY
COMMUNITY

## 2022-05-04 RX ORDER — AMMONIUM LACTATE 12 G/100G
LOTION TOPICAL PRN
COMMUNITY

## 2022-05-04 RX ORDER — ALBUTEROL SULFATE 90 UG/1
2 AEROSOL, METERED RESPIRATORY (INHALATION) EVERY 4 HOURS PRN
COMMUNITY

## 2022-05-04 RX ADMIN — SODIUM CHLORIDE, PRESERVATIVE FREE 5 ML: 5 INJECTION INTRAVENOUS at 23:45

## 2022-05-04 RX ADMIN — IOPAMIDOL 75 ML: 755 INJECTION, SOLUTION INTRAVENOUS at 18:12

## 2022-05-04 RX ADMIN — MORPHINE SULFATE 4 MG: 4 INJECTION, SOLUTION INTRAMUSCULAR; INTRAVENOUS at 18:02

## 2022-05-04 RX ADMIN — TAMSULOSIN HYDROCHLORIDE 0.4 MG: 0.4 CAPSULE ORAL at 21:01

## 2022-05-04 RX ADMIN — SODIUM CHLORIDE 1000 ML: 9 INJECTION, SOLUTION INTRAVENOUS at 18:03

## 2022-05-04 ASSESSMENT — PAIN DESCRIPTION - ORIENTATION
ORIENTATION: RIGHT;LOWER
ORIENTATION: RIGHT;LOWER
ORIENTATION: LOWER;RIGHT
ORIENTATION: RIGHT;LOWER

## 2022-05-04 ASSESSMENT — PAIN DESCRIPTION - DESCRIPTORS
DESCRIPTORS: ACHING;TENDER
DESCRIPTORS: ACHING
DESCRIPTORS: ACHING
DESCRIPTORS: PATIENT UNABLE TO DESCRIBE

## 2022-05-04 ASSESSMENT — PAIN DESCRIPTION - PAIN TYPE
TYPE: ACUTE PAIN

## 2022-05-04 ASSESSMENT — PAIN - FUNCTIONAL ASSESSMENT: PAIN_FUNCTIONAL_ASSESSMENT: ACTIVITIES ARE NOT PREVENTED

## 2022-05-04 ASSESSMENT — PAIN DESCRIPTION - LOCATION
LOCATION: ABDOMEN

## 2022-05-04 ASSESSMENT — PAIN SCALES - GENERAL
PAINLEVEL_OUTOF10: 6
PAINLEVEL_OUTOF10: 7
PAINLEVEL_OUTOF10: 4
PAINLEVEL_OUTOF10: 8
PAINLEVEL_OUTOF10: 5
PAINLEVEL_OUTOF10: 4

## 2022-05-04 ASSESSMENT — PAIN DESCRIPTION - FREQUENCY
FREQUENCY: CONTINUOUS

## 2022-05-04 ASSESSMENT — PAIN DESCRIPTION - ONSET: ONSET: ON-GOING

## 2022-05-04 NOTE — PROGRESS NOTES
Medication Reconciliation     List of medications patient is currently taking is complete. Source of information:   1. Conversation with patient at bedside  2. EPIC records        Notes regarding home medications:  1. Patient received all of his morning home medications today.   2. States he has not been taking a multivitamin   Denies other otc/herbal use  Suyapa Rivera, Pharmacy Intern 5/4/2022 7:30 PM

## 2022-05-05 ENCOUNTER — APPOINTMENT (OUTPATIENT)
Dept: GENERAL RADIOLOGY | Age: 67
DRG: 694 | End: 2022-05-05
Payer: MEDICARE

## 2022-05-05 VITALS
BODY MASS INDEX: 26.36 KG/M2 | OXYGEN SATURATION: 95 % | TEMPERATURE: 97.6 F | HEART RATE: 59 BPM | WEIGHT: 173.94 LBS | RESPIRATION RATE: 16 BRPM | SYSTOLIC BLOOD PRESSURE: 140 MMHG | DIASTOLIC BLOOD PRESSURE: 73 MMHG | HEIGHT: 68 IN

## 2022-05-05 LAB
ABO/RH: NORMAL
ANION GAP SERPL CALCULATED.3IONS-SCNC: 12 MMOL/L (ref 3–16)
ANTIBODY SCREEN: NORMAL
BASOPHILS ABSOLUTE: 0.1 K/UL (ref 0–0.2)
BASOPHILS RELATIVE PERCENT: 0.8 %
BUN BLDV-MCNC: 12 MG/DL (ref 7–20)
CALCIUM SERPL-MCNC: 9 MG/DL (ref 8.3–10.6)
CHLORIDE BLD-SCNC: 106 MMOL/L (ref 99–110)
CO2: 23 MMOL/L (ref 21–32)
CREAT SERPL-MCNC: 0.9 MG/DL (ref 0.8–1.3)
EOSINOPHILS ABSOLUTE: 0.2 K/UL (ref 0–0.6)
EOSINOPHILS RELATIVE PERCENT: 3 %
GFR AFRICAN AMERICAN: >60
GFR NON-AFRICAN AMERICAN: >60
GLUCOSE BLD-MCNC: 90 MG/DL (ref 70–99)
HCT VFR BLD CALC: 40.8 % (ref 40.5–52.5)
HEMOGLOBIN: 13.8 G/DL (ref 13.5–17.5)
LYMPHOCYTES ABSOLUTE: 1.9 K/UL (ref 1–5.1)
LYMPHOCYTES RELATIVE PERCENT: 31.5 %
MCH RBC QN AUTO: 31.5 PG (ref 26–34)
MCHC RBC AUTO-ENTMCNC: 33.9 G/DL (ref 31–36)
MCV RBC AUTO: 93 FL (ref 80–100)
MONOCYTES ABSOLUTE: 0.8 K/UL (ref 0–1.3)
MONOCYTES RELATIVE PERCENT: 13.2 %
NEUTROPHILS ABSOLUTE: 3.1 K/UL (ref 1.7–7.7)
NEUTROPHILS RELATIVE PERCENT: 51.5 %
PDW BLD-RTO: 13.1 % (ref 12.4–15.4)
PLATELET # BLD: 199 K/UL (ref 135–450)
PMV BLD AUTO: 7.9 FL (ref 5–10.5)
POTASSIUM SERPL-SCNC: 4.3 MMOL/L (ref 3.5–5.1)
RBC # BLD: 4.39 M/UL (ref 4.2–5.9)
SODIUM BLD-SCNC: 141 MMOL/L (ref 136–145)
WBC # BLD: 6.1 K/UL (ref 4–11)

## 2022-05-05 PROCEDURE — 6370000000 HC RX 637 (ALT 250 FOR IP): Performed by: NURSE PRACTITIONER

## 2022-05-05 PROCEDURE — 6360000002 HC RX W HCPCS: Performed by: NURSE PRACTITIONER

## 2022-05-05 PROCEDURE — 2580000003 HC RX 258: Performed by: NURSE PRACTITIONER

## 2022-05-05 PROCEDURE — 86901 BLOOD TYPING SEROLOGIC RH(D): CPT

## 2022-05-05 PROCEDURE — 74018 RADEX ABDOMEN 1 VIEW: CPT

## 2022-05-05 PROCEDURE — 86900 BLOOD TYPING SEROLOGIC ABO: CPT

## 2022-05-05 PROCEDURE — 36415 COLL VENOUS BLD VENIPUNCTURE: CPT

## 2022-05-05 PROCEDURE — 80048 BASIC METABOLIC PNL TOTAL CA: CPT

## 2022-05-05 PROCEDURE — 94760 N-INVAS EAR/PLS OXIMETRY 1: CPT

## 2022-05-05 PROCEDURE — 85025 COMPLETE CBC W/AUTO DIFF WBC: CPT

## 2022-05-05 PROCEDURE — 86850 RBC ANTIBODY SCREEN: CPT

## 2022-05-05 RX ADMIN — AMLODIPINE BESYLATE 10 MG: 10 TABLET ORAL at 10:04

## 2022-05-05 RX ADMIN — TAMSULOSIN HYDROCHLORIDE 0.4 MG: 0.4 CAPSULE ORAL at 01:45

## 2022-05-05 RX ADMIN — CEFTRIAXONE 1000 MG: 1 INJECTION, POWDER, FOR SOLUTION INTRAMUSCULAR; INTRAVENOUS at 05:54

## 2022-05-05 RX ADMIN — KETOROLAC TROMETHAMINE 15 MG: 30 INJECTION, SOLUTION INTRAMUSCULAR at 01:46

## 2022-05-05 RX ADMIN — SODIUM CHLORIDE, POTASSIUM CHLORIDE, SODIUM LACTATE AND CALCIUM CHLORIDE: 600; 310; 30; 20 INJECTION, SOLUTION INTRAVENOUS at 01:49

## 2022-05-05 ASSESSMENT — PAIN - FUNCTIONAL ASSESSMENT
PAIN_FUNCTIONAL_ASSESSMENT: ACTIVITIES ARE NOT PREVENTED

## 2022-05-05 ASSESSMENT — PAIN DESCRIPTION - DESCRIPTORS
DESCRIPTORS: ACHING;TENDER
DESCRIPTORS: ACHING
DESCRIPTORS: ACHING;TENDER
DESCRIPTORS: ACHING;TENDER

## 2022-05-05 ASSESSMENT — PAIN DESCRIPTION - ORIENTATION
ORIENTATION: RIGHT
ORIENTATION: RIGHT;LOWER
ORIENTATION: RIGHT
ORIENTATION: RIGHT

## 2022-05-05 ASSESSMENT — PAIN SCALES - GENERAL
PAINLEVEL_OUTOF10: 4
PAINLEVEL_OUTOF10: 0
PAINLEVEL_OUTOF10: 4
PAINLEVEL_OUTOF10: 4

## 2022-05-05 ASSESSMENT — PAIN DESCRIPTION - FREQUENCY
FREQUENCY: CONTINUOUS

## 2022-05-05 ASSESSMENT — LIFESTYLE VARIABLES
HOW OFTEN DO YOU HAVE A DRINK CONTAINING ALCOHOL: MONTHLY OR LESS
HOW MANY STANDARD DRINKS CONTAINING ALCOHOL DO YOU HAVE ON A TYPICAL DAY: 1 OR 2

## 2022-05-05 ASSESSMENT — PAIN DESCRIPTION - ONSET
ONSET: ON-GOING

## 2022-05-05 ASSESSMENT — PAIN DESCRIPTION - LOCATION
LOCATION: ABDOMEN

## 2022-05-05 ASSESSMENT — PAIN DESCRIPTION - PAIN TYPE
TYPE: ACUTE PAIN

## 2022-05-05 NOTE — PROGRESS NOTES
Hospitalist Progress Note      PCP: Amina Avendano    Date of Admission: 5/4/2022        Subjective: Pain is much better almost gone, no nausea vomiting abdominal pain fever or chills. Medications:  Reviewed    Infusion Medications    sodium chloride      lactated ringers Stopped (05/05/22 0554)     Scheduled Medications    amLODIPine  10 mg Oral Daily    tamsulosin  0.4 mg Oral Nightly    sodium chloride flush  5-40 mL IntraVENous 2 times per day    enoxaparin  40 mg SubCUTAneous Daily     PRN Meds: albuterol, hydrOXYzine, sodium chloride flush, sodium chloride, ondansetron **OR** ondansetron, polyethylene glycol, HYDROmorphone, traMADol, naloxone, acetaminophen      Intake/Output Summary (Last 24 hours) at 5/5/2022 1305  Last data filed at 5/5/2022 1146  Gross per 24 hour   Intake 405.46 ml   Output --   Net 405.46 ml       Physical Exam Performed:    BP (!) 140/73   Pulse 59   Temp 97.6 °F (36.4 °C) (Oral)   Resp 16   Ht 5' 8\" (1.727 m)   Wt 173 lb 15.1 oz (78.9 kg)   SpO2 95%   BMI 26.45 kg/m²     General appearance: No apparent distress  HEENT: Pupils equal, round, and reactive to light. Conjunctivae/corneas clear. Neck: Supple, with full range of motion. No jugular venous distention. Trachea midline. Respiratory:  Normal respiratory effort. Clear to auscultation, bilaterally without Rales/Wheezes/Rhonchi. Cardiovascular: Regular rate and rhythm with normal S1/S2 without murmurs, rubs or gallops. Abdomen: Soft, non-tender, non-distended with normal bowel sounds. Musculoskeletal: No clubbing, cyanosis or edema bilaterally. Full range of motion without deformity. Skin: Skin color, texture, turgor normal.  No rashes or lesions. Neurologic:  Neurovascularly intact without any focal sensory/motor deficits.  Cranial nerves: II-XII intact, grossly non-focal.  Psychiatric: Alert and oriented, thought content appropriate, normal insight  Capillary Refill: Brisk,3 seconds, normal Peripheral Pulses: +2 palpable, equal bilaterally       Labs:   Recent Labs     05/04/22  1707 05/05/22  0553   WBC 11.0 6.1   HGB 15.5 13.8   HCT 45.8 40.8    199     Recent Labs     05/04/22  1707 05/05/22  0553   * 141   K 3.5 4.3   CL 98* 106   CO2 20* 23   BUN 13 12   CREATININE 0.9 0.9   CALCIUM 9.5 9.0     Recent Labs     05/04/22 1707   AST 27   ALT 29   BILITOT 0.9   ALKPHOS 145*     No results for input(s): INR in the last 72 hours. No results for input(s): [place] in the last 72 hours. Urinalysis:      Lab Results   Component Value Date    NITRU Negative 05/04/2022    WBCUA 0 05/04/2022    BACTERIA None Seen 05/04/2022    RBCUA 5 05/04/2022    BLOODU MODERATE 05/04/2022    SPECGRAV <=1.005 05/04/2022    GLUCOSEU Negative 05/04/2022       Radiology:  CT ABDOMEN PELVIS W IV CONTRAST Additional Contrast? None   Final Result   8 mm stone lodged in the proximal right ureter causing mild obstruction. Bilateral renal cysts which are more prominent with no renal stones. Probable cholelithiasis which is more apparent. Chronic liver disease with fatty replacement throughout. Moderate diverticulosis throughout the left colon which is most severe along   the sigmoid region with no obvious pericolonic inflammation. Mild prostatic enlargement with no pelvic mass         XR ABDOMEN (KUB) (SINGLE AP VIEW)    (Results Pending)           Assessment/Plan:    Active Hospital Problems    Diagnosis     Ureteral obstruction, right [N13.5]      Priority: Medium     1. Obstructive uropathy, right ureteral, with mild hydronephrosis, appears improving, urology consulted, likely planning for outpatient lithotripsy, patient is almost pain-free. 2.  Essential hypertension, continue p.o. medications  3. Microscopic hematuria, due to kidney stone urology consulted        Diet: ADULT DIET;  Regular  Code Status: Full Code        Vanna Martinez MD

## 2022-05-05 NOTE — ED TRIAGE NOTES
Pt reports abd pain to RLQ x 2 days but reports pain worsened today, patient also reports diarrhea, rates pain at 7, denies taking pain medication prior to arrival

## 2022-05-05 NOTE — PROGRESS NOTES
Patient admitted to room 3119 via stretcher from ER. Pt oriented to room and routine. Patient report rt abd pain at a tolerable level of \"4\" on scale 1-10. Pt denies complaints of nausea or vomiting. Will continue to monitor.

## 2022-05-05 NOTE — ED NOTES
Report called to [employer]. All questions answered. Patient will be transported.       Amparo Gomez RN  05/04/22 0582

## 2022-05-05 NOTE — PROGRESS NOTES
Physician Progress Note      Alexey Ennis  CSN #:                  [id number]  :                       1955  ADMIT DATE:       2022 4:56 PM  [address] [place] DATE:  RESPONDING  PROVIDER #:        Cynthia Rosa MD          QUERY TEXT:    Dear Dr. Anjelica Jefferson,  Pt admitted with R ureteral obstruction. Noted documentation of \"Obstructing   right ureteral stone\" \" with mild hydro\" on  by ordered Urology consultant. If possible, please document in progress notes and discharge summary:      The medical record reflects the following:  Risk Factors: Hx Kidney stones  Clinical Indicators:  ED for c/o abd pain to RLQ x 2 days,  CT abd-8 mm   stone lodged in the proximal right ureter causing mild obstruction, H and P   notes \"Right ureteral obstruction\".  Urology notes \"8mm right proximal   ureteral stone with mild hydro\"  Treatment: Urology consult, medicated prn for pain, plan for stone retrieval   and stent, Flomax, outpatient ESWL  Thank you,  Juan M Nicholson RN, CDS  [email or web address]  Options provided:  -- Ureteral calculus obstruction with mild hydronephrosis confirmed present on   admission  -- Other - I will add my own diagnosis  -- Disagree - Not applicable / Not valid  -- Disagree - Clinically unable to determine / Unknown  -- Refer to Clinical Documentation Reviewer    PROVIDER RESPONSE TEXT:    The diagnosis of Ureteral calculus obstruction with mild hydronephrosis was   confirmed as present on admission. Query created by:  [id number]  on 2022 9:31 AM      Electronically signed by:  Cynthia Rosa MD 2022 1:03 PM

## 2022-05-05 NOTE — ED PROVIDER NOTES
[address]  [address] [address]  Dept: [phone]  Loc: [address] ENCOUNTER        This patient was not seen or evaluated by the attending physician. I evaluated this patient, the attending physician was available for consultation. CHIEF COMPLAINT    Chief Complaint   Patient presents with    Abdominal Pain     Pt reports abd pain to RLQ x 2 days but reports pain worsened today, patient also reports diarrhea, rates pain at 7, denies taking pain medication prior to arrival       HPI    Zeny Prieto is a 69 y.o. male who presents to the emergency department with a 2-day complaint of right lower quadrant abdominal pain. He states the pain became much more severe today. He has a history of kidney stone but this does not feel like his normal kidney stone. He has had no problems urinating and he has not seen visible blood. He also has a history of diverticulitis but this is normally on his left lower side and it does not feel like this. He is concerned that he may be having an appendicitis. He denies body aches, fever, chills, nausea or vomiting. His urologist is Dr. Panda Weldon from the urology group. REVIEW OF SYSTEMS    GI: see HPI, no vomiting, no diarrhea, no hematochezia  Cardiac: No chest pain, shortness of breath, palpitations or syncope  Pulmonary: No difficulty breathing or new cough  General: No fevers  : No dysuria, No hematuria  See HPI for further details. All other systems reviewed and are negative.     PAST MEDICAL & SURGICAL HISTORY    Past Medical History:   Diagnosis Date    Arthritis     Bronchitis     Diverticulitis     Hyperlipidemia     Hypertension     Kidney stone      Past Surgical History:   Procedure Laterality Date    HEEL SPUR SURGERY      HERNIA REPAIR      JOINT REPLACEMENT      NASAL SEPTUM SURGERY      SHOULDER ARTHROPLASTY      TOTAL KNEE ARTHROPLASTY         CURRENT MEDICATIONS  (may include discharge medications prescribed in the ED)      ALLERGIES    Allergies   Allergen Reactions    Vicodin [Hydrocodone-Acetaminophen] Swelling       SOCIAL AND FAMILY HISTORY    Social History     Socioeconomic History    Marital status:      Spouse name: None    Number of children: None    Years of education: None    Highest education level: None   Occupational History    None   Tobacco Use    Smoking status: Former Smoker    Smokeless tobacco: Never Used   Substance and Sexual Activity    Alcohol use: Yes     Comment: rare    Drug use: No    Sexual activity: Yes     Partners: Female   Other Topics Concern    None   Social History Narrative    None     Social Determinants of Health     Financial Resource Strain:     Difficulty of Paying Living Expenses: Not on file   Food Insecurity:     Worried About Running Out of Food in the Last Year: Not on file    Gypsy of Food in the Last Year: Not on file   Transportation Needs:     Lack of Transportation (Medical): Not on file    Lack of Transportation (Non-Medical):  Not on file   Physical Activity:     Days of Exercise per Week: Not on file    Minutes of Exercise per Session: Not on file   Stress:     Feeling of Stress : Not on file   Social Connections:     Frequency of Communication with Friends and Family: Not on file    Frequency of Social Gatherings with Friends and Family: Not on file    Attends Jain Services: Not on file    Active Member of Clubs or Organizations: Not on file    Attends Club or Organization Meetings: Not on file    Marital Status: Not on file   Intimate Partner Violence:     Fear of Current or Ex-Partner: Not on file    Emotionally Abused: Not on file    Physically Abused: Not on file    Sexually Abused: Not on file   Housing Stability:     Unable to Pay for Housing in the Last Year: Not on file    Number of [place] in the Last Year: Not on file    Unstable Housing in the Last Year: Not on file     Family History   Problem Relation Age of Onset    Stroke Mother     Heart Disease Mother     Stroke Father     Heart Disease Sister        PHYSICAL EXAM    VITAL SIGNS: /85   Pulse 71   Temp 98.1 °F (36.7 °C) (Oral)   Resp 17   Ht 5' 8\" (1.727 m)   Wt 179 lb 14.3 oz (81.6 kg)   SpO2 96%   BMI 27.35 kg/m²   Constitutional:  Well developed, well nourished, no acute distress  Eyes:  Sclera nonicteric, conjunctiva moist  HENT:  Atraumatic, nose normal  Neck: no JVD  Respiratory:  No retractions, no accessory muscle use, normal breath sounds   Cardiovascular: Regular rhythm and rate, S1-S2. No murmur   GI: Abdomen is soft, nontender and nondistended without rebound or guarding. Normal active bowel sounds throughout auscultation. He is positive CVA flank tenderness on the right. Musculoskeletal:  No edema, no acute deformities  Vascular: DP pulses 2+ and equal bilaterally  Integument: No rashes, skin dry  Neurologic:  Alert & oriented, no slurred speech  Psychiatric: Cooperative, pleasant affect       RADIOLOGY/PROCEDURES    CT ABDOMEN PELVIS W IV CONTRAST Additional Contrast? None   Final Result   8 mm stone lodged in the proximal right ureter causing mild obstruction. Bilateral renal cysts which are more prominent with no renal stones. Probable cholelithiasis which is more apparent. Chronic liver disease with fatty replacement throughout. Moderate diverticulosis throughout the left colon which is most severe along   the sigmoid region with no obvious pericolonic inflammation.       Mild prostatic enlargement with no pelvic mass           Labs Reviewed   CBC WITH AUTO DIFFERENTIAL - Abnormal; Notable for the following components:       Result Value    Neutrophils Absolute 7.8 (*)     Monocytes Absolute 1.5 (*)     All other components within normal limits   COMPREHENSIVE METABOLIC PANEL - Abnormal; Notable for the following components:    Sodium 133 (*)     Chloride 98 (*)     CO2 20 (*)     Alkaline Phosphatase 145 (*)     All other components within normal limits   URINALYSIS WITH REFLEX TO CULTURE - Abnormal; Notable for the following components:    Blood, Urine MODERATE (*)     All other components within normal limits   MICROSCOPIC URINALYSIS - Abnormal; Notable for the following components:    RBC, UA 5 (*)     All other components within normal limits   LIPASE   LACTIC ACID   BASIC METABOLIC PANEL   CBC WITH AUTO DIFFERENTIAL       ED COURSE & MEDICAL DECISION MAKING    Pertinent Labs & Imaging studies reviewed and interpreted. (See chart for details)     See chart for details of medications given during the ED stay.     Vitals:    05/04/22 1803 05/04/22 2103 05/04/22 2200 05/04/22 2324   BP: (!) 159/110 (!) 142/82 (!) 139/95 135/85   Pulse: 82 75 68 71   Resp: 13 15 15 17   Temp:   97.5 °F (36.4 °C) 98.1 °F (36.7 °C)   TempSrc:   Oral Oral   SpO2: 99% 97% 97% 96%   Weight:       Height:         Medications   albuterol (PROVENTIL) nebulizer solution 2.5 mg (has no administration in time range)   amLODIPine (NORVASC) tablet 10 mg (has no administration in time range)   hydrOXYzine (ATARAX) tablet 25 mg (has no administration in time range)   tamsulosin (FLOMAX) capsule 0.4 mg (has no administration in time range)   0.9 % sodium chloride infusion (has no administration in time range)   sodium chloride flush 0.9 % injection 5-40 mL (has no administration in time range)   sodium chloride flush 0.9 % injection 5-40 mL (has no administration in time range)   0.9 % sodium chloride infusion (has no administration in time range)   enoxaparin (LOVENOX) injection 40 mg (has no administration in time range)   ondansetron (ZOFRAN-ODT) disintegrating tablet 4 mg (has no administration in time range)     Or   ondansetron (ZOFRAN) injection 4 mg (has no administration in time range)   polyethylene glycol (GLYCOLAX) packet 17 g (has no administration in time range)   HYDROmorphone (DILAUDID) injection 1 mg (has no administration in time range)   traMADol (ULTRAM) tablet 50 mg (has no administration in time range)   lactated ringers infusion (has no administration in time range)   naloxone [facility]) injection 0.4 mg (has no administration in time range)   cefTRIAXone (ROCEPHIN) 1000 mg IVPB in 50 mL D5W minibag (has no administration in time range)   ketorolac (TORADOL) injection 15 mg (has no administration in time range)   acetaminophen (TYLENOL) tablet 1,000 mg (has no administration in time range)   0.9 % sodium chloride bolus (0 mLs IntraVENous Stopped 5/4/22 1903)   morphine (PF) injection 4 mg (4 mg IntraVENous Given 5/4/22 1802)   iopamidol (ISOVUE-370) 76 % injection 75 mL (75 mLs IntraVENous Given 5/4/22 1812)   tamsulosin (FLOMAX) capsule 0.4 mg (0.4 mg Oral Given 5/4/22 2101)     I have seen evaluate this patient. My attending physician was available for consultation. Differential diagnosis includes was not limited to urinary tract infection, pyelonephritis, ureterolithiasis, appendicitis, colitis, diverticulitis, bowel obstruction, other    On arrival he clearly is uncomfortable. He is unable to sit still. He is mildly diaphoretic. He is afebrile. He has had 2-day complaint of right lower quadrant pain. He does have a history of stone but he states that this pain that he is experiencing is not his typical kidney stone pain. Therefore I scanned his abdomen and pelvis with IV contrast which reveals an 8 mm stone lodged in the proximal right ureter causing mild obstruction. He is not vomiting. He is afebrile. He has no leukocytosis. His creatinine is unremarkable. He was given IV fluids and morphine for pain control. He has bilateral renal cysts which are more prominent with no renal stones. No infection on urine. He was given 1 round of morphine. On reevaluation the patient appears much more comfortable.   His pain is now down to a 4/10. I discussed admission versus discharge following up with his urologist but the patient feels that he needs to be admitted for pain control. I think this is reasonable considering the size of the stone. 2110 I spoke with Dr. Saumya Diaz, urologist. Will admit to medicine. 2115 I spoke with the hospitalist service regarding this patient. Patient is to be n.p.o. after midnight. Patient will be admitted to the hospitalist service. The patient and his wife agree with the plan of care. FINAL IMPRESSION    1. Ureterolithiasis    2.  Intractable pain        PLAN  Admission    (Please note that this note was completed with a voice recognition program.  Every attempt was made to edit the dictations, but inevitably there remain words that are mis-transcribed.)        NGA Allen CNP  05/04/22 2118       NGA Allen CNP  05/04/22 8810

## 2022-05-05 NOTE — PLAN OF CARE
Problem: Discharge Planning  Goal: Discharge to home or other facility with appropriate resources  Outcome: Progressing  Flowsheets  Taken 5/5/2022 0230  Discharge to home or other facility with appropriate resources:   Identify barriers to discharge with patient and caregiver   Arrange for needed discharge resources and transportation as appropriate  Taken 5/5/2022 0005  Discharge to home or other facility with appropriate resources: Arrange for needed discharge resources and transportation as appropriate     Problem: Pain  Goal: Verbalizes/displays adequate comfort level or baseline comfort level  Outcome: Progressing  Flowsheets  Taken 5/5/2022 0230  Verbalizes/displays adequate comfort level or baseline comfort level:   Encourage patient to monitor pain and request assistance   Administer analgesics based on type and severity of pain and evaluate response   Assess pain using appropriate pain scale   Implement non-pharmacological measures as appropriate and evaluate response  Taken 5/4/2022 2347  Verbalizes/displays adequate comfort level or baseline comfort level:   Encourage patient to monitor pain and request assistance   Assess pain using appropriate pain scale   Administer analgesics based on type and severity of pain and evaluate response   Implement non-pharmacological measures as appropriate and evaluate response

## 2022-05-05 NOTE — CARE COORDINATION
INITIAL CASE MANAGEMENT ASSESSMENT AND DISCHARGE SUMMARY     Reviewed chart, met with patient to assess possible discharge needs. Explained Case Management role/services. Living Situation: Patient plans to return home home with spouse. ADLs: independent     DME: none    PT/OT Recs: na     Active Services: none     Transportation: active ; spouse     Medications: takes on his own; [place], [place]    PCP: Yessi Esquivel    PLAN/COMMENTS: Patient reported he plans to return home and denied needs at the present time. Rn aware. SW/CM will follow and assist as needed.     Electronically signed by DAVID Norris, DEB, Case Management on 5/5/2022 at [address] [place] [address] [phone]

## 2022-05-05 NOTE — CONSULTS
Consulting Physician: NGA Carlin    Reason for Consult: Obstructing right ureteral stone    History of Present Illness: Zach Smith is a 69 y.o.  male with prior history of kidney stones, last about 10 years ago, who developed RLQ abdominal pain two days ago after cutting grass. He denies any urinary changes or GH. CT scan with 8mm proximal ureteral stone with mild hydro. His pain has resolved at this time, afebrile, UA Negative, renal function normal. We discussed options of placing ureteral stent today vs ESWL and he would like to be discharged home and have an ESWL performed. Past Medical History:   Past Medical History:   Diagnosis Date    Arthritis     Bronchitis     Diverticulitis     Hyperlipidemia     Hypertension     Kidney stone        Past Surgical History:  Past Surgical History:   Procedure Laterality Date    HEEL SPUR SURGERY      HERNIA REPAIR      JOINT REPLACEMENT      NASAL SEPTUM SURGERY      SHOULDER ARTHROPLASTY      TOTAL KNEE ARTHROPLASTY         Social History:  Social History     Socioeconomic History    Marital status:      Spouse name: Not on file    Number of children: Not on file    Years of education: Not on file    Highest education level: Not on file   Occupational History    Not on file   Tobacco Use    Smoking status: Former Smoker    Smokeless tobacco: Never Used   Substance and Sexual Activity    Alcohol use: Yes     Comment: rare    Drug use: No    Sexual activity: Yes     Partners: Female   Other Topics Concern    Not on file   Social History Narrative    Not on file     Social Determinants of Health     Financial Resource Strain:     Difficulty of Paying Living Expenses: Not on file   Food Insecurity:     Worried About Running Out of Food in the Last Year: Not on file    Gypsy of Food in the Last Year: Not on file   Transportation Needs:     Lack of Transportation (Medical):  Not on file    Lack of Transportation (Non-Medical):  Not on file   Physical Activity:     Days of Exercise per Week: Not on file    Minutes of Exercise per Session: Not on file   Stress:     Feeling of Stress : Not on file   Social Connections:     Frequency of Communication with Friends and Family: Not on file    Frequency of Social Gatherings with Friends and Family: Not on file    Attends Tenriism Services: Not on file    Active Member of Clubs or Organizations: Not on file    Attends Club or Organization Meetings: Not on file    Marital Status: Not on file   Intimate Partner Violence:     Fear of Current or Ex-Partner: Not on file    Emotionally Abused: Not on file    Physically Abused: Not on file    Sexually Abused: Not on file   Housing Stability:     Unable to Pay for Housing in the Last Year: Not on file    Number of Places Lived in the Last Year: Not on file    Unstable Housing in the Last Year: Not on file       Family History:  Family History   Problem Relation Age of Onset    Stroke Mother     Heart Disease Mother     Stroke Father     Heart Disease Sister        Meds:   Current Facility-Administered Medications: albuterol (PROVENTIL) nebulizer solution 2.5 mg, 2.5 mg, Nebulization, Q4H PRN  amLODIPine (NORVASC) tablet 10 mg, 10 mg, Oral, Daily  hydrOXYzine (ATARAX) tablet 25 mg, 25 mg, Oral, Nightly PRN  tamsulosin (FLOMAX) capsule 0.4 mg, 0.4 mg, Oral, Nightly  sodium chloride flush 0.9 % injection 5-40 mL, 5-40 mL, IntraVENous, 2 times per day  sodium chloride flush 0.9 % injection 5-40 mL, 5-40 mL, IntraVENous, PRN  0.9 % sodium chloride infusion, , IntraVENous, PRN  enoxaparin (LOVENOX) injection 40 mg, 40 mg, SubCUTAneous, Daily  ondansetron (ZOFRAN-ODT) disintegrating tablet 4 mg, 4 mg, Oral, Q8H PRN **OR** ondansetron (ZOFRAN) injection 4 mg, 4 mg, IntraVENous, Q6H PRN  polyethylene glycol (GLYCOLAX) packet 17 g, 17 g, Oral, Daily PRN  HYDROmorphone (DILAUDID) injection 1 mg, 1 mg, IntraVENous, Q4H PRN  traMADol (ULTRAM) tablet 50 mg, 50 mg, Oral, Q6H PRN  lactated ringers infusion, , IntraVENous, Continuous  naloxone (NARCAN) injection 0.4 mg, 0.4 mg, IntraVENous, PRN  acetaminophen (TYLENOL) tablet 1,000 mg, 1,000 mg, Oral, Q8H PRN    Review of Systems:  10 Systems were reviewed and negative except as in HPI    Vitals:  /85   Pulse 71   Temp 98.1 °F (36.7 °C) (Oral)   Resp 17   Ht 5' 8\" (1.727 m)   Wt 173 lb 15.1 oz (78.9 kg)   SpO2 96%   BMI 26.45 kg/m²     Intake/Output Summary (Last 24 hours) at 5/5/2022 0815  Last data filed at 5/5/2022 0555  Gross per 24 hour   Intake 405.46 ml   Output --   Net 405.46 ml       Physical Exam:  General Appearance: Alert and oriented, cooperative, no distress, appears stated age  Head: Normocephalic, without obvious abnormality, atraumatic  Back: no CVA tenderness  Lungs: respirations unlabored, no wheezing  Heart: Regular rate and rhythm, no lower extremity edema noted  Abdomen: Soft, non-tender, non-distended, no masses  Skin: Skin color, texture, turgor normal, no rashes or lesions  Neurologic: no gross deficits   Male :   Nonpalpable bladder   No CVA tenderness   LAUREN Not indicated    Labs:  CBC   Lab Results   Component Value Date    WBC 6.1 05/05/2022    RBC 4.39 05/05/2022    HGB 13.8 05/05/2022    HCT 40.8 05/05/2022    MCV 93.0 05/05/2022    MCH 31.5 05/05/2022    MCHC 33.9 05/05/2022    RDW 13.1 05/05/2022     05/05/2022    MPV 7.9 05/05/2022     BMP   Lab Results   Component Value Date     05/05/2022    K 4.3 05/05/2022     05/05/2022    CO2 23 05/05/2022    BUN 12 05/05/2022    CREATININE 0.9 05/05/2022    GLUCOSE 90 05/05/2022    CALCIUM 9.0 05/05/2022       Urinalysis:   Lab Results   Component Value Date    COLORU Straw 05/04/2022    GLUCOSEU Negative 05/04/2022    BLOODU MODERATE 05/04/2022    NITRU Negative 05/04/2022    LEUKOCYTESUR Negative 05/04/2022       Imaging:  Pertinent images and radiologist's report were reviewed independently  CT abdomen/pelvis 5/4/22  Impression   8 mm stone lodged in the proximal right ureter causing mild obstruction.       Bilateral renal cysts which are more prominent with no renal stones.       Probable cholelithiasis which is more apparent.       Chronic liver disease with fatty replacement throughout.       Moderate diverticulosis throughout the left colon which is most severe along   the sigmoid region with no obvious pericolonic inflammation.       Mild prostatic enlargement with no pelvic mass     Impression/Plan:   - 67y. o. male with 8mm right proximal ureteral stone with mild hydro. - KUB to ensure stone is visible for ESWL  - ESWL explained in detail. Risks discussed including hematoma, infection, possible stent placement and side effects of stent discussed including urgency, frequency, dysuria and hematuria. - Our office will contact him later today to let him know what time to arrive in [facility] tomorrow.   - Continue Flomax, pain medication PRN     NGA Gonzalez - CNP 5/5/20228:15 AM

## 2022-05-05 NOTE — H&P
Hospital Medicine History & Physical      PCP: Derick Ellis    Date of Admission: 5/4/2022    Date of Service: Pt seen/examined on 5/4/2022 and Admitted to Inpatient       Chief Complaint:  RLQ abdominal pain        History Of Present Illness: The patient is a 69 y.o. male who presents to [facility] with PMHx: Kidney stones, diverticulitis, HLD, HTN  S Hx: Multiple orthopedic procedures    Lives at home w/ wife  No anticoagulation therapy  CODE STATUS: Full  Retired    Patient presented to the emergency department with severe intractable right lower quadrant pain. Negative for fever. Negative for chills. Had not noticed any blood in the urine. Denied trauma. Negative for fever. Negative for chills. Negative for nausea or vomiting. Last kidney stone 8 to 10 years ago: Urologist was Dr. Valentino Chavez from urology group. ED workup: Urinalysis positive for hematuria no evidence of infection. Lactic acid 1.0.  CT abdomen and pelvis indicating a right proximal ureteral obstruction 8 mm stone with hydronephrosis. Additional findings bilateral renal cysts, cholelithiasis, diverticulosis and fatty liver disease. No evidence of leukocytosis. Vital signs were stable. Urology consulted from the ED: Dr. Lupe brandon was spoken with. Patient to be n.p.o. after midnight, pain management and plan for ureteral stent and stone retraction in a.m. On my exam: Patient's vital signs are stable. Awake alert and oriented. Nontoxic in appearance pain is beginning to increase again. It is in the right lower quadrant. No abdominal tenderness. Abdomen remains soft. Urine in urinal at bedside is clear yellow.       Past Medical History:        Diagnosis Date    Arthritis     Bronchitis     Diverticulitis     Hyperlipidemia     Hypertension     Kidney stone Past Surgical History:        Procedure Laterality Date    HEEL SPUR SURGERY      HERNIA REPAIR      JOINT REPLACEMENT      NASAL SEPTUM SURGERY      SHOULDER ARTHROPLASTY      TOTAL KNEE ARTHROPLASTY         Medications Prior to Admission:    Prior to Admission medications    Medication Sig Start Date End Date Taking? Authorizing Provider   albuterol sulfate HFA (VENTOLIN HFA) 108 (90 Base) MCG/ACT inhaler Inhale 2 puffs into the lungs every 4 hours as needed for Wheezing   Yes Historical Provider, MD   albuterol (PROVENTIL) (2.5 MG/3ML) 0.083% nebulizer solution Take 2.5 mg by nebulization every 4 hours as needed for Wheezing   Yes Historical Provider, MD   hydrOXYzine (ATARAX) 25 MG tablet Take 25 mg by mouth nightly as needed for Anxiety   Yes Historical Provider, MD   ammonium lactate (LAC-HYDRIN) 12 % lotion Apply topically as needed for Dry Skin Apply topically as needed. Yes Historical Provider, MD   vitamin E 400 UNIT capsule Take 400 Units by mouth daily   Yes Historical Provider, MD   Omega-3 Fatty Acids (FISH OIL) 500 MG CAPS Take 1 capsule by mouth daily   Yes Historical Provider, MD   ascorbic acid (VITAMIN C) 500 MG tablet Take 500 mg by mouth in the morning and at bedtime    Historical Provider, MD   tamsulosin (FLOMAX) 0.4 MG capsule Take 0.4 mg by mouth at bedtime  12/8/21   Historical Provider, MD   amLODIPine (NORVASC) 10 MG tablet Take 10 mg by mouth daily. Historical Provider, MD       Allergies:  Vicodin [hydrocodone-acetaminophen]    Social History:  The patient currently lives at home with wife    TOBACCO:   reports that he has quit smoking. He has never used smokeless tobacco.  ETOH:   reports current alcohol use. Family History:  Reviewed in detail and negative for DM, Early CAD, Cancer, CVA.  Positive as follows:        Problem Relation Age of Onset    Stroke Mother     Heart Disease Mother     Stroke Father     Heart Disease Sister        REVIEW OF SYSTEMS:    as noted in the HPI. All other systems reviewed and negative. PHYSICAL EXAM:    /85   Pulse 71   Temp 98.1 °F (36.7 °C) (Oral)   Resp 17   Ht 5' 8\" (1.727 m)   Wt 179 lb 14.3 oz (81.6 kg)   SpO2 96%   BMI 27.35 kg/m²     General appearance: No apparent distress appears stated age and cooperative. HEENT Normal cephalic, atraumatic without obvious deformity. Pupils equal, round, and reactive to light. Extra ocular muscles intact. Conjunctivae/corneas clear. Neck: Supple, No jugular venous distention/bruits. Trachea midline without thyromegaly or adenopathy with full range of motion. Lungs: Clear to auscultation, bilaterally without Rales/Wheezes/Rhonchi with good respiratory effort. Heart: Regular rate and rhythm with Normal S1/S2 without murmurs, rubs or gallops, point of maximum impulse non-displaced  Abdomen: Soft, non-tender or non-distended without rigidity or guarding and positive bowel sounds all four quadrants. Extremities: No clubbing, cyanosis, or edema bilaterally. Full range of motion without deformity and normal gait intact. Skin: Skin color, texture, turgor normal.  No rashes or lesions. Neurologic: Alert and oriented X 3, neurovascularly intact with sensory/motor intact upper extremities/lower extremities, bilaterally. Cranial nerves: II-XII intact, grossly non-focal.  Mental status: Alert, oriented, thought content appropriate.   Capillary Refill: Acceptable  < 3 seconds  Peripheral Pulses: +3 Easily felt, not easily obliterated with pressure      CXR:  I have reviewed the CXR with the following interpretation: N/A  EKG:  I have reviewed the EKG with the following interpretation: N/A    CBC   Recent Labs     05/04/22  1707   WBC 11.0   HGB 15.5   HCT 45.8         RENAL  Recent Labs     05/04/22  1707   *   K 3.5   CL 98*   CO2 20*   BUN 13   CREATININE 0.9     LFT'S  Recent Labs     05/04/22  1707   AST 27   ALT 29   BILITOT 0.9   ALKPHOS 145*     COAG  No results for input(s): INR in the last 72 hours. CARDIAC ENZYMES  No results for input(s): CKTOTAL, CKMB, CKMBINDEX, TROPONINI in the last 72 hours. U/A:    Lab Results   Component Value Date    COLORU Straw 05/04/2022    WBCUA 0 05/04/2022    RBCUA 5 05/04/2022    MUCUS 2+ 12/20/2013    BACTERIA None Seen 05/04/2022    CLARITYU Clear 05/04/2022    SPECGRAV <=1.005 05/04/2022    LEUKOCYTESUR Negative 05/04/2022    BLOODU MODERATE 05/04/2022    GLUCOSEU Negative 05/04/2022       ABG  No results found for: GKF7CLN, BEART, C5HZHJMX, PHART, THGBART, OVD1XPM, PO2ART, RBJ4NJM        Active Hospital Problems    Diagnosis Date Noted    Ureteral obstruction, right [N13.5] 05/04/2022     Priority: Medium         PHYSICIANS CERTIFICATION:    I certify that Hu Melvin is expected to be hospitalized for less than 2 midnights based on the following assessment and plan:      ASSESSMENT/PLAN:  Right ureteral obstruction: As evidenced on CT: Right ureter 8 mm  No evidence of sepsis, no evidence of infection, no evidence of KYLE  Urology consulted: Dr. Elena Villatoro for stent and stone retrieval in a.m.  N.p.o. after midnight  IVF: LR  Pain management: Tylenol 1 g every 8 hour as needed pain, tramadol as needed, Dilaudid IVP as needed  Rocephin in a.m. preoperatively  Tamsulosin    HTN: Continue amlodipine per home regimen    Other home meds continued  DVT Prophylaxis: Lovenox  Diet: Diet NPO  Diet NPO  Code Status: Prior  PT/OT Eval Status: Independent    Dispo - admit, inpt       Talita Geoffrey Cullen, APRN - CNP    Thank you Hillary Coronado for the opportunity to be involved in this patient's care. If you have any questions or concerns please feel free to contact me at [phone]. This dictation was performed with a verbal recognition program (DRAGON) and it was checked for errors. It is possible that there are still dictated errors within this office note. If so, please bring any errors to my attention for an addendum.  All efforts were made to ensure that this office note is accurate.

## 2022-05-05 NOTE — PROGRESS NOTES
Patient is resting in bed and is alert and oriented x4. Patient is up ad rodney. Patient denies any pain and thinks it moved out of the spot it was that was causing him pain. Urologist saw patient today and ordered an x-ray and told this RN as long as they can see it on the x-ray the patient is okay to discharge from their stand point to get the procedure outpatient tomorrow. [facility]. Urine still being strained although urology is fairly confident he will not pass the stone on his own. Patient on RA. Patient remains NPO for x-ray then urologist gave okay for diet order after that. Morning medication given but Lovenox was held for outpatient procedure tomorrow and head to toe assessment is complete. All needs are met and safety precautions remain in place. Will continue to monitor and assess.   Electronically signed by Jasmine Saha RN on 5/5/2022 at 10:20 AM

## 2022-05-05 NOTE — PROGRESS NOTES
Pt discharged to home. Accompanied by spouse. Transported in personal vehicle. Discharge instructions, Rx, and personal belongings given to pt. Explanation of discharge medications and instructions understood by verbal statement. No questions, comments or concerns at this time.    Electronically signed by Brandon Marks RN on 5/5/2022 at 4:28 PM

## 2022-05-05 NOTE — DISCHARGE SUMMARY
Hospital Medicine Discharge Summary    Patient ID: Tracy Judd      Patient's PCP: Aparna Durbin Date: 5/4/2022     Discharge Date:   05/05/2022    Admitting Provider: Cole Lerner DO     Discharge Provider: Shelby Becerra MD     Discharge Diagnoses: Active Hospital Problems    Diagnosis     Ureteral obstruction, right [N13.5]      Priority: Medium       The patient was seen and examined on day of discharge and this discharge summary is in conjunction with any daily progress note from day of discharge. Hospital Course:     From HPI:The patient is a 69 y.o. male who presents to [facility] with PMHx: Kidney stones, diverticulitis, HLD, HTN  S Hx: Multiple orthopedic procedures     Lives at home w/ wife  No anticoagulation therapy  CODE STATUS: Full  Retired     Patient presented to the emergency department with severe intractable right lower quadrant pain. Negative for fever. Negative for chills. Had not noticed any blood in the urine. Denied trauma. Negative for fever. Negative for chills. Negative for nausea or vomiting. Last kidney stone 8 to 10 years ago: Urologist was Dr. Valentino Chavez from urology group.           ED workup: Urinalysis positive for hematuria no evidence of infection. Lactic acid 1.0.  CT abdomen and pelvis indicating a right proximal ureteral obstruction 8 mm stone with hydronephrosis. Additional findings bilateral renal cysts, cholelithiasis, diverticulosis and fatty liver disease. No evidence of leukocytosis. Vital signs were stable. Urology consulted from the ED: Dr. Lupe brandon was spoken with. Patient to be n.p.o. after midnight, pain management and plan for ureteral stent and stone retraction in a.m.     On my exam: Patient's vital signs are stable. Awake alert and oriented. Nontoxic in appearance pain is beginning to increase again. It is in the right lower quadrant. No abdominal tenderness. Abdomen remains soft.   Urine in urinal at bedside is clear yellow. \"        1. Obstructive uropathy, right ureteral, with mild hydronephrosis, appears improving, urology consulted, and plan to follow-up with urology for lithotripsy as outpatient. Patient advised to seek immediate medical help in case of  recurrence of his pain  2. Essential hypertension, continue p.o. medications  3. Microscopic hematuria, due to kidney stone urology consulted        Physical Exam Performed:     BP (!) 140/73   Pulse 59   Temp 97.6 °F (36.4 °C) (Oral)   Resp 16   Ht 5' 8\" (1.727 m)   Wt 173 lb 15.1 oz (78.9 kg)   SpO2 95%   BMI 26.45 kg/m²       Per progress note      Labs: For convenience and continuity at follow-up the following most recent labs are provided:      CBC:    Lab Results   Component Value Date    WBC 6.1 05/05/2022    HGB 13.8 05/05/2022    HCT 40.8 05/05/2022     05/05/2022       Renal:    Lab Results   Component Value Date     05/05/2022    K 4.3 05/05/2022     05/05/2022    CO2 23 05/05/2022    BUN 12 05/05/2022    CREATININE 0.9 05/05/2022    CALCIUM 9.0 05/05/2022         Significant Diagnostic Studies    Radiology:   XR ABDOMEN (KUB) (SINGLE AP VIEW)   Final Result   Persistent 8 mm stone in the proximal right ureter         CT ABDOMEN PELVIS W IV CONTRAST Additional Contrast? None   Final Result   8 mm stone lodged in the proximal right ureter causing mild obstruction. Bilateral renal cysts which are more prominent with no renal stones. Probable cholelithiasis which is more apparent. Chronic liver disease with fatty replacement throughout. Moderate diverticulosis throughout the left colon which is most severe along   the sigmoid region with no obvious pericolonic inflammation.       Mild prostatic enlargement with no pelvic mass                Consults:     IP CONSULT TO UROLOGY  IP CONSULT TO HOSPITALIST  IP CONSULT TO UROLOGY    Disposition:  HOME     Condition at Discharge: Stable    Discharge Instructions/Follow-up:  PCP, Urology     Code Status:  Full Code     Activity: activity as tolerated    Diet: regular diet      Discharge Medications:     Current Discharge Medication List           Details   albuterol sulfate HFA (VENTOLIN HFA) 108 (90 Base) MCG/ACT inhaler Inhale 2 puffs into the lungs every 4 hours as needed for Wheezing      albuterol (PROVENTIL) (2.5 MG/3ML) 0.083% nebulizer solution Take 2.5 mg by nebulization every 4 hours as needed for Wheezing      hydrOXYzine (ATARAX) 25 MG tablet Take 25 mg by mouth nightly as needed for Anxiety      ammonium lactate (LAC-HYDRIN) 12 % lotion Apply topically as needed for Dry Skin Apply topically as needed. vitamin E 400 UNIT capsule Take 400 Units by mouth daily      Omega-3 Fatty Acids (FISH OIL) 500 MG CAPS Take 1 capsule by mouth daily      ascorbic acid (VITAMIN C) 500 MG tablet Take 500 mg by mouth in the morning and at bedtime      tamsulosin (FLOMAX) 0.4 MG capsule Take 0.4 mg by mouth at bedtime       amLODIPine (NORVASC) 10 MG tablet Take 10 mg by mouth daily. Time Spent on discharge is more than 45 minutes in the examination, evaluation, counseling and review of medications and discharge plan. Signed:    Donald Siddiqi MD   5/5/2022      Thank you Saumya Rayo for the opportunity to be involved in this patient's care. If you have any questions or concerns, please feel free to contact me at [phone].

## 2022-05-05 NOTE — PLAN OF CARE
Problem: Discharge Planning  Goal: Discharge to home or other facility with appropriate resources  5/5/2022 0811 by Andrez Nuñez RN  Outcome: Progressing  Note: Patient will discharge to home or other facility with appropriate resources. Will monitor and assess. 5/5/2022 0230 by Debbi Murrieta RN  Outcome: Progressing  Flowsheets  Taken 5/5/2022 0230  Discharge to home or other facility with appropriate resources:   Identify barriers to discharge with patient and caregiver   Arrange for needed discharge resources and transportation as appropriate  Taken 5/5/2022 0005  Discharge to home or other facility with appropriate resources: Arrange for needed discharge resources and transportation as appropriate     Problem: Pain  Goal: Verbalizes/displays adequate comfort level or baseline comfort level  5/5/2022 0811 by Andrez Nuñez RN  Outcome: Progressing  Note: Patient verbalizes/displays adequate comfort level or baseline comfort level. Will monitor and assess. 5/5/2022 0230 by Debbi Murrieta RN  Outcome: Progressing  Flowsheets  Taken 5/5/2022 0230  Verbalizes/displays adequate comfort level or baseline comfort level:   Encourage patient to monitor pain and request assistance   Administer analgesics based on type and severity of pain and evaluate response   Assess pain using appropriate pain scale   Implement non-pharmacological measures as appropriate and evaluate response  Taken 5/4/2022 2347  Verbalizes/displays adequate comfort level or baseline comfort level:   Encourage patient to monitor pain and request assistance   Assess pain using appropriate pain scale   Administer analgesics based on type and severity of pain and evaluate response   Implement non-pharmacological measures as appropriate and evaluate response     Problem: Safety - Adult  Goal: Free from fall injury  Outcome: Progressing  Note: Fall risk assessment completed. Fall precautions in place. Call light within reach.  Pt educated on calling for assistance before getting up. Walkway free of clutter. Will continue to monitor.

## 2022-05-05 NOTE — ACP (ADVANCE CARE PLANNING)
Advance Care Planning     Advance Care Planning Activator (Inpatient)  Conversation Note      Date of ACP Conversation: 5/5/2022     Conversation Conducted with: Patient with Decision Making Capacity    [id number]     Current Designated Health Care Decision Maker:     Primary Decision Maker: Sharmila Venu Spouse - [phone]      Care Preferences    Ventilation: \"If you were in your present state of health and suddenly became very ill and were unable to breathe on your own, what would your preference be about the use of a ventilator (breathing machine) if it were available to you? \"      Would the patient desire the use of ventilator (breathing machine)?: no    \"If your health worsens and it becomes clear that your chance of recovery is unlikely, what would your preference be about the use of a ventilator (breathing machine) if it were available to you? \"     Would the patient desire the use of ventilator (breathing machine)?: No      Resuscitation  \"CPR works best to restart the heart when there is a sudden event, like a heart attack, in someone who is otherwise healthy. Unfortunately, CPR does not typically restart the heart for people who have serious health conditions or who are very sick. \"    \"In the event your heart stopped as a result of an underlying serious health condition, would you want attempts to be made to restart your heart (answer \"yes\" for attempt to resuscitate) or would you prefer a natural death (answer \"no\" for do not attempt to resuscitate)? \" yes       [] Yes   [] No   Educated Patient / Sharon Chin regarding differences between Advance Directives and portable DNR orders.     Length of ACP Conversation in minutes:      Conversation Outcomes:  [x] ACP discussion completed  [] Existing advance directive reviewed with patient; no changes to patient's previously recorded wishes  [] New Advance Directive completed  [] Portable Do Not Rescitate prepared for Provider review and signature  [] POLST/POST/MOLST/MOST prepared for Provider review and signature      Follow-up plan:    [] Schedule follow-up conversation to continue planning  [] Referred individual to Provider for additional questions/concerns   [] Advised patient/agent/surrogate to review completed ACP document and update if needed with changes in condition, patient preferences or care setting    [x] This note routed to one or more involved healthcare provider        Electronically signed by Lucas Castillo, DAVID, DEB, Case Management on 5/5/2022 at 4:16 PM  [facility] [phone]

## 2022-10-25 ENCOUNTER — OFFICE VISIT (OUTPATIENT)
Dept: ORTHOPEDIC SURGERY | Age: 67
End: 2022-10-25
Payer: COMMERCIAL

## 2022-10-25 VITALS — BODY MASS INDEX: 26.22 KG/M2 | HEIGHT: 68 IN | WEIGHT: 173 LBS

## 2022-10-25 DIAGNOSIS — M19.011 PRIMARY OSTEOARTHRITIS OF RIGHT SHOULDER: Primary | ICD-10-CM

## 2022-10-25 PROCEDURE — 20610 DRAIN/INJ JOINT/BURSA W/O US: CPT | Performed by: ORTHOPAEDIC SURGERY

## 2022-10-25 RX ORDER — METHYLPREDNISOLONE ACETATE 40 MG/ML
80 INJECTION, SUSPENSION INTRA-ARTICULAR; INTRALESIONAL; INTRAMUSCULAR; SOFT TISSUE ONCE
Status: COMPLETED | OUTPATIENT
Start: 2022-10-25 | End: 2022-10-25

## 2022-10-25 RX ADMIN — METHYLPREDNISOLONE ACETATE 80 MG: 40 INJECTION, SUSPENSION INTRA-ARTICULAR; INTRALESIONAL; INTRAMUSCULAR; SOFT TISSUE at 10:18

## 2022-10-25 NOTE — PROGRESS NOTES
12 [facility]  History and Physical  Shoulder Pain    Date:  10/25/2022    Name:  Misty Parry  Address:  [address] [place] [address]    :  1955      Age:   69 y.o.    SSN:  [id number]      Medical Record Number:  [id number]    Reason for Visit:    Shoulder Pain (F/U RIGHT SHOULDER)      HPI:   Misty Parry is a 69 y.o. male who presents to our office today for follow up of the right shoulder pain. Patient has been receiving serial right shoulder corticosteroid injections on a yearly basis for right shoulder osteoarthritis. He presents today for repeat injection. He denies numbness, tingling, decreased sensation or diminished motor function in extremity    Pain Assessment  Location of Pain: Shoulder  Location Modifiers: Right  Severity of Pain: 3  Quality of Pain: Aching  Duration of Pain: Persistent  Frequency of Pain: Constant  Aggravating Factors: Other (Comment), Exercise, Straightening, Stretching  Limiting Behavior: Yes  Relieving Factors: Rest, Nsaids  Work-Related Injury: No  Are there other pain locations you wish to document?: No    No notes on file    Review of Systems:  A 14 point review of systems available in the scanned medical record as documented by the patient and reviewed on 10/25/2022. The review is negative with the exception of those things mentioned in the History of Present Illness and Past Medical History. Past Medical History:  Patient's medications, allergies, past medical, surgical, social and family histories were reviewed and updated as appropriate. Allergies: Allergies   Allergen Reactions    Vicodin [Hydrocodone-Acetaminophen] Swelling    Codeine Swelling     Facial swelling       Physical Exam:  There were no vitals filed for this visit. General: Misty Parry is a healthy and well appearing 69 y.o. male who is sitting comfortably in our office in acute distress.      Skin:  There are no skin lesions, cellulitis, or extreme edema. The patient has warm and well-perfused Bilateral upper extremities with brisk capillary refill. Eyes: Extra-ocular muscles intact  Mouth: Oral mucosa moist. No perioral lesions  Pulm: Respirations unlabored and regular. Neuro: Alert and oriented x3    Right shoulder Exam:  Inspection:  No gross deformities, no signs of infection. Palpation:  There is mild crepitus. Active Range of Motion: Forward elevation of 170, abduction of 80, external rotation with elbow at the side 10, internal rotation to the back is L5    Strength: External rotation with resistance with elbow at the side 5/5, internal rotation with resistance with elbow at the side 5/5, Champagne toast testing 5/5, Jobes test 5/5    Special Tests:  No Richard muscle deformity. Neurovascular: Sensation to light touch is intact, no motor deficits, palpable radial pulses 2+    Additional Examinations:    Examination of the contralateral extremity does not show any tenderness, deformity or injury. Range of motion is unremarkable. There is no gross instability. There are no rashes, ulcerations or lesions. Strength and tone are normal.      Radiographic:  3 xray views of the right  shoulder including True AP in internal and external and axillary lateral were taken in our office today reveal no fractures, dislocations, visible tumors, or signs of acute trauma. Radiographic Impression: Right shoulder osteoarthritis with osteophytes inferior humeral head. 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 male with right shoulder osteoarthritis. He is trying to hold off on anatomic total shoulder arthroplasty. Corticosteroid injections continue to provide him with months of pain relief at a time. Impression:  Encounter Diagnosis   Name Primary?     Primary osteoarthritis of right shoulder Yes       Office Procedures:  Orders Placed This Encounter Procedures    XR SHOULDER RIGHT (MIN 2 VIEWS)     Standing Status:   Future     Number of Occurrences:   1     Standing Expiration Date:   10/25/2023     Order Specific Question:   Reason for exam:     Answer:   pain    20610 - MT DRAIN/INJECT LARGE JOINT/BURSA     Procedure Note:  After discussing the risks and benefits of a corticosteroid injection, Wellington Fu did state an understanding and gave verbal consent to proceed. After cleansing the injection site with Chlora-prep and using aseptic techniques,  2 CC of Depo Medrol 40mg/ml and 8 CC of 0.5% ropivacaine were injected in the right shoulder. He  tolerated the procedure well with no immediate adverse sequelae after the injection. A bandage was placed over the injection site. Appropriate post injections instructions were given to the patient. Plan:   We discussed patient's diagnosis of right shoulder osteoarthritis. He received corticosteroid injection into the right shoulder at today's visit. We educated patient on potential waning of corticosteroid effectiveness and the potential need for right shoulder arthroplasty. We would like to obtain a CT scan right shoulder prior to his next visit to further evaluate bony anatomy. Anna Hoff will follow up in as needed. They were in agreement with this plan and all questions were answered to the patient's satisfaction. They were encouraged to call with any questions. 10/25/2022  10:20 AM    Haritha Luciano D.O. Orthopedic Surgeon, [facility] Fellow    The encounter with the patient was supervised by Dr. Clement Limon who personally examined the patient and reviewed the plan. This dictation was performed with a verbal recognition program (DRAGON) and it was checked for errors. It is possible that there are still dictated errors within this office note. If so, please bring any errors to my attention for an addendum.   All efforts were made to ensure that this office note is accurate.  __________________  I was physically present and personally supervised the Orthopaedic Sports Medicine Fellow in the evaluation and development of a treatment plan for this patient. I personally interviewed the patient and performed a physical examination. In addition, I discussed the patient's condition and treatment options with them. I have also reviewed and agree with the past medical, family and social history unless otherwise noted. All of the patient's questions were answered. Merritt Limon MD, PhD  10/25/2022

## 2023-02-28 LAB
CHOLESTEROL, TOTAL: 225 MG/DL
CHOLESTEROL/HDL RATIO: ABNORMAL
HDLC SERPL-MCNC: 46 MG/DL (ref 35–70)
LDL CHOLESTEROL: 158
NONHDLC SERPL-MCNC: 179 MG/DL
TRIGL SERPL-MCNC: 106 MG/DL
VLDLC SERPL CALC-MCNC: ABNORMAL MG/DL

## 2023-05-21 ENCOUNTER — HOSPITAL ENCOUNTER (EMERGENCY)
Age: 68
Discharge: HOME | End: 2023-05-21
Attending: EMERGENCY MEDICINE
Payer: COMMERCIAL

## 2023-05-21 ENCOUNTER — APPOINTMENT (OUTPATIENT)
Dept: CT IMAGING | Age: 68
End: 2023-05-21
Payer: COMMERCIAL

## 2023-05-21 VITALS
WEIGHT: 178.35 LBS | OXYGEN SATURATION: 98 % | TEMPERATURE: 97.6 F | DIASTOLIC BLOOD PRESSURE: 87 MMHG | SYSTOLIC BLOOD PRESSURE: 142 MMHG | HEIGHT: 68 IN | BODY MASS INDEX: 27.03 KG/M2 | RESPIRATION RATE: 10 BRPM | HEART RATE: 49 BPM

## 2023-05-21 DIAGNOSIS — R10.9 RIGHT SIDED ABDOMINAL PAIN: Primary | ICD-10-CM

## 2023-05-21 LAB
ALBUMIN SERPL-MCNC: 4.8 G/DL (ref 3.4–5)
ALBUMIN/GLOB SERPL: 1.3 {RATIO} (ref 1.1–2.2)
ALP SERPL-CCNC: 124 U/L (ref 40–129)
ALT SERPL-CCNC: 34 U/L (ref 10–40)
ANION GAP SERPL CALCULATED.3IONS-SCNC: 11 MMOL/L (ref 3–16)
AST SERPL-CCNC: 35 U/L (ref 15–37)
BASOPHILS # BLD: 0.1 K/UL (ref 0–0.2)
BASOPHILS NFR BLD: 1.3 %
BILIRUB SERPL-MCNC: 1 MG/DL (ref 0–1)
BILIRUB UR QL STRIP.AUTO: NEGATIVE
BUN SERPL-MCNC: 12 MG/DL (ref 7–20)
CALCIUM SERPL-MCNC: 9.4 MG/DL (ref 8.3–10.6)
CHLORIDE SERPL-SCNC: 102 MMOL/L (ref 99–110)
CLARITY UR: CLEAR
CO2 SERPL-SCNC: 24 MMOL/L (ref 21–32)
COLOR UR: YELLOW
CREAT SERPL-MCNC: 0.8 MG/DL (ref 0.8–1.3)
DEPRECATED RDW RBC AUTO: 13.6 % (ref 12.4–15.4)
EOSINOPHIL # BLD: 0.2 K/UL (ref 0–0.6)
EOSINOPHIL NFR BLD: 2.1 %
GFR SERPLBLD CREATININE-BSD FMLA CKD-EPI: >60 ML/MIN/{1.73_M2}
GLUCOSE SERPL-MCNC: 94 MG/DL (ref 70–99)
GLUCOSE UR STRIP.AUTO-MCNC: NEGATIVE MG/DL
HCT VFR BLD AUTO: 45 % (ref 40.5–52.5)
HGB BLD-MCNC: 15.5 G/DL (ref 13.5–17.5)
HGB UR QL STRIP.AUTO: NEGATIVE
KETONES UR STRIP.AUTO-MCNC: 15 MG/DL
LEUKOCYTE ESTERASE UR QL STRIP.AUTO: NEGATIVE
LIPASE SERPL-CCNC: 34 U/L (ref 13–60)
LYMPHOCYTES # BLD: 2 K/UL (ref 1–5.1)
LYMPHOCYTES NFR BLD: 26.6 %
MCH RBC QN AUTO: 31.7 PG (ref 26–34)
MCHC RBC AUTO-ENTMCNC: 34.5 G/DL (ref 31–36)
MCV RBC AUTO: 91.8 FL (ref 80–100)
MONOCYTES # BLD: 0.8 K/UL (ref 0–1.3)
MONOCYTES NFR BLD: 10.4 %
NEUTROPHILS # BLD: 4.4 K/UL (ref 1.7–7.7)
NEUTROPHILS NFR BLD: 59.6 %
NITRITE UR QL STRIP.AUTO: NEGATIVE
PH UR STRIP.AUTO: 6 [PH] (ref 5–8)
PLATELET # BLD AUTO: 247 K/UL (ref 135–450)
PMV BLD AUTO: 8.2 FL (ref 5–10.5)
POTASSIUM SERPL-SCNC: 4 MMOL/L (ref 3.5–5.1)
PROT SERPL-MCNC: 8.5 G/DL (ref 6.4–8.2)
PROT UR STRIP.AUTO-MCNC: NEGATIVE MG/DL
RBC # BLD AUTO: 4.91 M/UL (ref 4.2–5.9)
SODIUM SERPL-SCNC: 137 MMOL/L (ref 136–145)
SP GR UR STRIP.AUTO: 1.02 (ref 1–1.03)
UA COMPLETE W REFLEX CULTURE PNL UR: ABNORMAL
UA DIPSTICK W REFLEX MICRO PNL UR: ABNORMAL
URN SPEC COLLECT METH UR: ABNORMAL
UROBILINOGEN UR STRIP-ACNC: 0.2 E.U./DL
WBC # BLD AUTO: 7.3 K/UL (ref 4–11)

## 2023-05-21 PROCEDURE — 85025 COMPLETE CBC W/AUTO DIFF WBC: CPT

## 2023-05-21 PROCEDURE — 81003 URINALYSIS AUTO W/O SCOPE: CPT

## 2023-05-21 PROCEDURE — 74176 CT ABD & PELVIS W/O CONTRAST: CPT

## 2023-05-21 PROCEDURE — 83690 ASSAY OF LIPASE: CPT

## 2023-05-21 PROCEDURE — 80053 COMPREHEN METABOLIC PANEL: CPT

## 2023-05-21 PROCEDURE — 96374 THER/PROPH/DIAG INJ IV PUSH: CPT

## 2023-05-21 PROCEDURE — 2580000003 HC RX 258: Performed by: PHYSICIAN ASSISTANT

## 2023-05-21 PROCEDURE — 6360000002 HC RX W HCPCS: Performed by: PHYSICIAN ASSISTANT

## 2023-05-21 PROCEDURE — 99284 EMERGENCY DEPT VISIT MOD MDM: CPT

## 2023-05-21 RX ORDER — KETOROLAC TROMETHAMINE 30 MG/ML
15 INJECTION, SOLUTION INTRAMUSCULAR; INTRAVENOUS ONCE
Status: COMPLETED | OUTPATIENT
Start: 2023-05-21 | End: 2023-05-21

## 2023-05-21 RX ORDER — NAPROXEN 500 MG/1
500 TABLET ORAL 2 TIMES DAILY PRN
Qty: 12 TABLET | Refills: 0 | Status: SHIPPED | OUTPATIENT
Start: 2023-05-21

## 2023-05-21 RX ORDER — 0.9 % SODIUM CHLORIDE 0.9 %
1000 INTRAVENOUS SOLUTION INTRAVENOUS ONCE
Status: COMPLETED | OUTPATIENT
Start: 2023-05-21 | End: 2023-05-21

## 2023-05-21 RX ORDER — METHOCARBAMOL 750 MG/1
750 TABLET, FILM COATED ORAL 4 TIMES DAILY PRN
Qty: 12 TABLET | Refills: 0 | Status: SHIPPED | OUTPATIENT
Start: 2023-05-21

## 2023-05-21 RX ADMIN — KETOROLAC TROMETHAMINE 15 MG: 30 INJECTION, SOLUTION INTRAMUSCULAR; INTRAVENOUS at 16:11

## 2023-05-21 RX ADMIN — SODIUM CHLORIDE 1000 ML: 9 INJECTION, SOLUTION INTRAVENOUS at 16:21

## 2023-05-21 ASSESSMENT — PAIN DESCRIPTION - LOCATION
LOCATION: FLANK
LOCATION: FLANK

## 2023-05-21 ASSESSMENT — PAIN DESCRIPTION - FREQUENCY: FREQUENCY: CONTINUOUS

## 2023-05-21 ASSESSMENT — PAIN DESCRIPTION - DESCRIPTORS: DESCRIPTORS: SHARP

## 2023-05-21 ASSESSMENT — PAIN DESCRIPTION - DIRECTION: RADIATING_TOWARDS: PELVIS

## 2023-05-21 ASSESSMENT — PAIN SCALES - GENERAL
PAINLEVEL_OUTOF10: 7
PAINLEVEL_OUTOF10: 5
PAINLEVEL_OUTOF10: 9
PAINLEVEL_OUTOF10: 9

## 2023-05-21 ASSESSMENT — PAIN - FUNCTIONAL ASSESSMENT
PAIN_FUNCTIONAL_ASSESSMENT: 0-10
PAIN_FUNCTIONAL_ASSESSMENT: 0-10

## 2023-05-21 ASSESSMENT — PAIN DESCRIPTION - ORIENTATION: ORIENTATION: RIGHT

## 2023-05-21 ASSESSMENT — LIFESTYLE VARIABLES: HOW OFTEN DO YOU HAVE A DRINK CONTAINING ALCOHOL: NEVER

## 2023-05-21 ASSESSMENT — PAIN DESCRIPTION - PAIN TYPE: TYPE: ACUTE PAIN

## 2023-06-15 ENCOUNTER — OFFICE VISIT (OUTPATIENT)
Dept: PULMONOLOGY | Age: 68
End: 2023-06-15
Payer: COMMERCIAL

## 2023-06-15 VITALS
HEART RATE: 75 BPM | DIASTOLIC BLOOD PRESSURE: 60 MMHG | TEMPERATURE: 97.7 F | RESPIRATION RATE: 18 BRPM | HEIGHT: 68 IN | OXYGEN SATURATION: 94 % | BODY MASS INDEX: 26.86 KG/M2 | SYSTOLIC BLOOD PRESSURE: 128 MMHG | WEIGHT: 177.2 LBS

## 2023-06-15 DIAGNOSIS — R06.02 SOB (SHORTNESS OF BREATH): Primary | ICD-10-CM

## 2023-06-15 PROCEDURE — 3074F SYST BP LT 130 MM HG: CPT | Performed by: INTERNAL MEDICINE

## 2023-06-15 PROCEDURE — 1123F ACP DISCUSS/DSCN MKR DOCD: CPT | Performed by: INTERNAL MEDICINE

## 2023-06-15 PROCEDURE — 3078F DIAST BP <80 MM HG: CPT | Performed by: INTERNAL MEDICINE

## 2023-06-15 PROCEDURE — 99203 OFFICE O/P NEW LOW 30 MIN: CPT | Performed by: INTERNAL MEDICINE

## 2023-06-21 ENCOUNTER — HOSPITAL ENCOUNTER (OUTPATIENT)
Dept: PULMONOLOGY | Age: 68
Discharge: HOME | End: 2023-06-21
Attending: INTERNAL MEDICINE
Payer: COMMERCIAL

## 2023-06-21 DIAGNOSIS — R06.02 SOB (SHORTNESS OF BREATH): ICD-10-CM

## 2023-06-21 LAB
DLCO %PRED: 128 %
DLCO PRED: NORMAL
DLCO/VA %PRED: NORMAL
DLCO/VA PRED: NORMAL
DLCO/VA: NORMAL
DLCO: NORMAL
EXPIRATORY TIME-POST: NORMAL
EXPIRATORY TIME: NORMAL
FEF 25-75% %CHNG: NORMAL
FEF 25-75% %PRED-POST: NORMAL
FEF 25-75% %PRED-PRE: NORMAL
FEF 25-75% PRED: NORMAL
FEF 25-75%-POST: NORMAL
FEF 25-75%-PRE: NORMAL
FEV1 %PRED-POST: 114 %
FEV1 %PRED-PRE: 108 %
FEV1 PRED: NORMAL
FEV1-POST: NORMAL
FEV1-PRE: NORMAL
FEV1/FVC %PRED-POST: NORMAL
FEV1/FVC %PRED-PRE: NORMAL
FEV1/FVC PRED: NORMAL
FEV1/FVC-POST: NORMAL
FEV1/FVC-PRE: NORMAL
FVC %PRED-POST: 101 L
FVC %PRED-PRE: 99 %
FVC PRED: NORMAL
FVC-POST: NORMAL
FVC-PRE: NORMAL
GAW %PRED: NORMAL
GAW PRED: NORMAL
GAW: NORMAL
IC %PRED: NORMAL
IC PRED: NORMAL
IC: NORMAL
MEP: NORMAL
MIP: NORMAL
MVV %PRED-PRE: NORMAL
MVV PRED: NORMAL
MVV-PRE: NORMAL
PEF %PRED-POST: NORMAL
PEF %PRED-PRE: NORMAL
PEF PRED: NORMAL
PEF%CHNG: NORMAL
PEF-POST: NORMAL
PEF-PRE: NORMAL
RAW %PRED: NORMAL
RAW PRED: NORMAL
RAW: NORMAL
RV %PRED: NORMAL
RV PRED: NORMAL
RV: NORMAL
SVC %PRED: NORMAL
SVC PRED: NORMAL
SVC: NORMAL
TLC %PRED: 90 %
TLC PRED: NORMAL
TLC: NORMAL
VA %PRED: NORMAL
VA PRED: NORMAL
VA: NORMAL
VTG %PRED: NORMAL
VTG PRED: NORMAL
VTG: NORMAL

## 2023-06-21 PROCEDURE — 94726 PLETHYSMOGRAPHY LUNG VOLUMES: CPT

## 2023-06-21 PROCEDURE — 94640 AIRWAY INHALATION TREATMENT: CPT

## 2023-06-21 PROCEDURE — 6370000000 HC RX 637 (ALT 250 FOR IP): Performed by: INTERNAL MEDICINE

## 2023-06-21 PROCEDURE — 94060 EVALUATION OF WHEEZING: CPT

## 2023-06-21 PROCEDURE — 94729 DIFFUSING CAPACITY: CPT

## 2023-06-21 RX ORDER — ALBUTEROL SULFATE 90 UG/1
4 AEROSOL, METERED RESPIRATORY (INHALATION) ONCE
Status: COMPLETED | OUTPATIENT
Start: 2023-06-21 | End: 2023-06-21

## 2023-06-21 RX ADMIN — ALBUTEROL SULFATE 4 PUFF: 90 AEROSOL, METERED RESPIRATORY (INHALATION) at 08:20

## 2023-06-21 ASSESSMENT — PULMONARY FUNCTION TESTS
FVC_PERCENT_PREDICTED_PRE: 99
FVC_PERCENT_PREDICTED_POST: 101
FEV1_PERCENT_PREDICTED_POST: 114
FEV1_PERCENT_PREDICTED_PRE: 108

## 2023-06-22 NOTE — PROCEDURES
Spirometry was acceptable and reproducible by ATS standards      Spirometry/Flow volume loop:  Spirometry and flow volume loops do not show airflow obstruction. FEV1 114%. %There is no bronchodilator response. Lung volumes:  Lung volumes do not demonstrate restriction or hyperinflation. Diffusing capacity:  DLCO is slightly elevated, not corrected for hemoglobin. Summary:  Essentially normal PFTs. There is a slight increase in diffusion capacity. This can sometimes be seen in asthma, diffuse alveolar hemorrhage, polycythemia as well as other causes. Clinical correlation advised. FEV1 %Pred-Post   Date Value Ref Range Status   06/21/2023 114 % Final     TLC %Pred   Date Value Ref Range Status   06/21/2023 90 % Final       PFT data will be scanned into the media tab under this encounter. Please see the scanned data for numerical values.      Surekha yLon MD  [facility] Pulmonary, Sleep and Critical Care Medicine

## 2023-06-23 PROBLEM — R06.02 SOB (SHORTNESS OF BREATH): Status: ACTIVE | Noted: 2023-06-23

## 2023-06-23 ASSESSMENT — ENCOUNTER SYMPTOMS: SHORTNESS OF BREATH: 1

## 2023-07-05 ENCOUNTER — OFFICE VISIT (OUTPATIENT)
Dept: ORTHOPEDIC SURGERY | Age: 68
End: 2023-07-05

## 2023-07-05 VITALS — WEIGHT: 177 LBS | BODY MASS INDEX: 26.83 KG/M2 | HEIGHT: 68 IN

## 2023-07-05 DIAGNOSIS — M19.011 PRIMARY OSTEOARTHRITIS OF RIGHT SHOULDER: Primary | ICD-10-CM

## 2023-07-05 DIAGNOSIS — M25.511 RIGHT SHOULDER PAIN, UNSPECIFIED CHRONICITY: ICD-10-CM

## 2023-07-05 DIAGNOSIS — M25.611 DECREASED RANGE OF MOTION OF RIGHT SHOULDER: ICD-10-CM

## 2023-07-05 NOTE — PROGRESS NOTES
Date:  2023    Name:  Elsa Aguilar  Address:  [address] [place], [place] [address]na [facility] [address]    :  1955      Age:   77 y.o. Chief Complaint    Shoulder Pain (F/U RIGHT SHOULDER)      History of Present Illness:  Elsa Aguilar is a pleasant, 77 y.o., male, here today for follow up of his right shoulder pain. The patient has a past medical history of a left total shoulder replacement and has been treated conservatively for the arthritis in his right shoulder. This conservative treatment includes: rest, ice, elevation, home exercises, activity modification, OTC medications as needed, and corticosteroid injections. The patient states that corticosteroid injections give him several months of relief. However, he still notes constant achy pain with sharp intermittent bouts with certain positions. He is decreased motion with forward flexion, abduction and internal rotation. Recently a CT scan was obtained to evaluate the bony anatomy for determine options for further  treatment including surgical options. The patient denies any numbness, paresthesias or recently decreased motor function. Pain Assessment  Location of Pain: Shoulder  Location Modifiers: Right  Severity of Pain: 3  Quality of Pain: Aching  Duration of Pain: Persistent  Frequency of Pain: Constant  Aggravating Factors: Other (Comment)  Limiting Behavior: Some  Relieving Factors: Rest  Work-Related Injury: No  Are there other pain locations you wish to document?: No      Medical History:  Patient's medications, allergies, past medical, surgical, social and family histories were reviewed and updated as appropriate. No notes on file    Review of Systems  A 14 point review of systems was completed by the patient and is available in the media section of the scanned medical record and was reviewed on 2023. The review is negative with the exception of those things mentioned in the HPI and Past Medical History    Vital Signs:   There

## 2023-08-03 ENCOUNTER — OFFICE VISIT (OUTPATIENT)
Dept: PULMONOLOGY | Age: 68
End: 2023-08-03
Payer: COMMERCIAL

## 2023-08-03 VITALS
TEMPERATURE: 97.7 F | WEIGHT: 176.2 LBS | RESPIRATION RATE: 16 BRPM | SYSTOLIC BLOOD PRESSURE: 130 MMHG | HEIGHT: 68 IN | DIASTOLIC BLOOD PRESSURE: 90 MMHG | BODY MASS INDEX: 26.7 KG/M2 | HEART RATE: 88 BPM | OXYGEN SATURATION: 98 %

## 2023-08-03 DIAGNOSIS — J45.20 MILD INTERMITTENT EXTRINSIC ASTHMA WITHOUT COMPLICATION: Primary | ICD-10-CM

## 2023-08-03 PROCEDURE — 1123F ACP DISCUSS/DSCN MKR DOCD: CPT | Performed by: INTERNAL MEDICINE

## 2023-08-03 PROCEDURE — 99213 OFFICE O/P EST LOW 20 MIN: CPT | Performed by: INTERNAL MEDICINE

## 2023-08-03 PROCEDURE — 3075F SYST BP GE 130 - 139MM HG: CPT | Performed by: INTERNAL MEDICINE

## 2023-08-03 PROCEDURE — 3080F DIAST BP >= 90 MM HG: CPT | Performed by: INTERNAL MEDICINE

## 2023-08-03 NOTE — ASSESSMENT & PLAN NOTE
- Normal PFTs  -Most likely patient has some mild intermittent asthma or just some borderline bronchial reactivity  -In either case he gets symptomatic relief from albuterol so would maintain with albuterol as needed  -Patient will monitor symptoms and albuterol requirements  -We will reevaluate in 1 year

## 2023-08-03 NOTE — PROGRESS NOTES
[address], SLEEP, AND CRITICAL CARE    Stanislaw Phan (:  1955) is a 77 y.o. male,New patient, here for evaluation of the following chief complaint(s):  Shortness of Breath         ASSESSMENT/PLAN:  1. Mild intermittent extrinsic asthma without complication  Assessment & Plan:  - Normal PFTs  -Most likely patient has some mild intermittent asthma or just some borderline bronchial reactivity  -In either case he gets symptomatic relief from albuterol so would maintain with albuterol as needed  -Patient will monitor symptoms and albuterol requirements  -We will reevaluate in 1 year        Return in about 1 year (around 8/3/2024). Future Appointments   Date Time Provider [address]   2023  9:00 AM Jax Mcqueen MD [facility]            Subjective   SUBJECTIVE/OBJECTIVE:  Follow-up shortness of breath. States this is doing much better. Has occasional days where he needs to use albuterol inhaler. Otherwise very infrequently. Had PFTs performed that showed an isolated increase in DLCO but otherwise unremarkable. He does get symptomatic relief with albuterol. Quit smoking in       Review of Systems   Constitutional: Negative. Cardiovascular: Negative. Neurological: Negative. Psychiatric/Behavioral: Negative. Objective   Physical Exam     Gen:  No acute distress. Eyes: EOMI. Anicteric sclera. No conjunctival injection. ENT: No discharge. External appearance of ears and nose normal.  Neck: Trachea midline. No mass   Resp:  No crackles. No wheezes. No rhonchi. No dullness on percussion. CV: Regular rate. Regular rhythm. No murmur or rub. No edema. Neuro:  no focal neurologic deficit. Moves all extremities  Psych: Awake and alert, Oriented x 3. Judgement and insight appropriate. Mood stable. I spent 21 minutes with the patient, >50% was face-to-face in discussion of the diagnosis and the coordination of care in regards to the treatment plan.

## 2023-08-21 ENCOUNTER — TELEPHONE (OUTPATIENT)
Dept: ORTHOPEDIC SURGERY | Age: 68
End: 2023-08-21

## 2023-08-22 ENCOUNTER — OFFICE VISIT (OUTPATIENT)
Dept: ORTHOPEDIC SURGERY | Age: 68
End: 2023-08-22

## 2023-08-22 VITALS — WEIGHT: 176 LBS | HEIGHT: 68 IN | BODY MASS INDEX: 26.67 KG/M2

## 2023-08-22 DIAGNOSIS — M19.011 PRIMARY OSTEOARTHRITIS OF RIGHT SHOULDER: Primary | ICD-10-CM

## 2023-08-22 DIAGNOSIS — M25.611 DECREASED RANGE OF MOTION OF RIGHT SHOULDER: ICD-10-CM

## 2023-08-22 NOTE — PROGRESS NOTES
[address] [place] [address]  History and Physical  Shoulder Pain    Date:  2023    Name:  Elsa Aguilar  Address:  [address] [place] [address]    :  1955      Age:   77 y.o.    SSN:  [id number]      Medical Record Number:  [id number]    Reason for Visit:    Shoulder Pain (PRE OP RIGHT SHOULDER)      HPI:   Elsa Aguilar is a 77 y.o. male who presents to our office today for follow up of the right shoulder pain. Patient has been diagnosed with primary osteoarthritis of his right shoulder. Patient reports he has failed conservative treatment and would like to consider surgical intervention. He would like to discuss surgery in full detail today along with risk and benefits. He patient reports he has not had any new injury since his last visit. He reports his shoulder limits his ADLs and continues to cause some discomfort on a daily basis. Pain Assessment  Location of Pain: Shoulder  Location Modifiers: Right  Severity of Pain: 4  Quality of Pain: Dull, Aching  Duration of Pain: Persistent  Frequency of Pain: Intermittent  Aggravating Factors: Other (Comment), Stretching, Straightening, Exercise  Limiting Behavior: Some  Relieving Factors: Rest  Work-Related Injury: No  Are there other pain locations you wish to document?: No    No notes on file    Review of Systems:  A 14 point review of systems available in the scanned medical record as documented by the patient and reviewed on 2023. The review is negative with the exception of those things mentioned in the History of Present Illness and Past Medical History. Past Medical History:  Patient's medications, allergies, past medical, surgical, social and family histories were reviewed and updated as appropriate. Allergies:   Allergies   Allergen Reactions    Vicodin [Hydrocodone-Acetaminophen] Swelling    Codeine Swelling     Facial swelling       Physical Exam:  There were no vitals filed for this

## 2023-08-22 NOTE — H&P (VIEW-ONLY)
[address] [place] [address]  History and Physical  Shoulder Pain    Date:  2023    Name:  Elsa Aguilar  Address:  [address] [place] [address]    :  1955      Age:   77 y.o.    SSN:  [id number]      Medical Record Number:  [id number]    Reason for Visit:    Shoulder Pain (PRE OP RIGHT SHOULDER)      HPI:   Elsa Aguilar is a 77 y.o. male who presents to our office today for follow up of the right shoulder pain. Patient has been diagnosed with primary osteoarthritis of his right shoulder. Patient reports he has failed conservative treatment and would like to consider surgical intervention. He would like to discuss surgery in full detail today along with risk and benefits. He patient reports he has not had any new injury since his last visit. He reports his shoulder limits his ADLs and continues to cause some discomfort on a daily basis. Pain Assessment  Location of Pain: Shoulder  Location Modifiers: Right  Severity of Pain: 4  Quality of Pain: Dull, Aching  Duration of Pain: Persistent  Frequency of Pain: Intermittent  Aggravating Factors: Other (Comment), Stretching, Straightening, Exercise  Limiting Behavior: Some  Relieving Factors: Rest  Work-Related Injury: No  Are there other pain locations you wish to document?: No    No notes on file    Review of Systems:  A 14 point review of systems available in the scanned medical record as documented by the patient and reviewed on 2023. The review is negative with the exception of those things mentioned in the History of Present Illness and Past Medical History. Past Medical History:  Patient's medications, allergies, past medical, surgical, social and family histories were reviewed and updated as appropriate. Allergies:   Allergies   Allergen Reactions    Vicodin [Hydrocodone-Acetaminophen] Swelling    Codeine Swelling     Facial swelling       Physical Exam:  There were no vitals filed for this visit. General: Itaol Méndez is a healthy and well appearing 77 y.o. male who is sitting comfortably in our office in acute distress. Skin:  There are no skin lesions, cellulitis, or extreme edema. The patient has warm and well-perfused Bilateral upper extremities with brisk capillary refill. Eyes: Extra-ocular muscles intact  Mouth: Oral mucosa moist. No perioral lesions  Pulm: Respirations unlabored and regular. Neuro: Alert and oriented x3     Right shoulder Exam:  Inspection:  No gross deformities, no signs of infection. Palpation:  There is mild crepitus. Active Range of Motion: Forward elevation of 90, abduction of 65, external rotation with elbow at the side 10, internal rotation to the back L5     Strength: External rotation with resistance with elbow at the side 5/5, internal rotation with resistance with elbow at the side 4/5, Champagne toast testing 4/5, Jobes test 5/5     Special Tests:  No Richard muscle deformity. Neurovascular: Sensation to light touch is intact, no motor deficits, palpable radial pulses 2+    Additional Examinations:    Examination of the contralateral extremity does not show any tenderness, deformity or injury. Range of motion is unremarkable. There is no gross instability. There are no rashes, ulcerations or lesions. Strength and tone are normal.      Radiographic:  X ray from 10/25/22 was reviewed. CT rig shoulder5/15/2023  CONCLUSION:   1. Glenohumeral joint arthropathy with joint space narrowing, spurring of the inferomedial   humeral head and inferior glenoid, and chondromalacia. Multiple intra-articular loose bodies   evident. 2. Posterior decentering of the humeral head indicating macroinstability. No evidence of   dislocation. 3. Small bony fragments adjacent to the superior glenoid likely remote avulsion injury. No   evidence of acute osseous injury. Assessment:  Italo Méndez is a 77 y.o. male right shoulder glenohumeral osteoarthritis.

## 2023-08-24 NOTE — TELEPHONE ENCOUNTER
APPROVED  CPT: 47374  AUTH: [id number]  DATE RANGE: 9/11/23 TO 2/21/23  INSURANCE: Татьяна Zavala  [facility] OF SX RT SHLD  NOTE: DOC SCANNED

## 2023-08-29 ENCOUNTER — TELEPHONE (OUTPATIENT)
Dept: ORTHOPEDIC SURGERY | Age: 68
End: 2023-08-29

## 2023-08-29 NOTE — TELEPHONE ENCOUNTER
Orthopedic Nurse Navigator Summary    Patient Name:  Karolina Ravi  Anticipated Date of Surgery:  09/11/23  Attended Pre-op Education Class:  Video sent to patient email 08/25/23- pt watched  PCP: Demetris Castillo MD  Date of PCP visit for H&P: 08/29/23  Is patient in a Pain Management program:  Review of Medical history reveals history of: HTN, Hyperlipidemia, BPH, GERD, Asthma- went once for pulmonologist evaluation- patient used to work at [employer] and was evaluated due to this. Critical Lab Values  - Hemoglobin (g/dL):  Date: 08/29/23 Value 15.6  - Hematocrit(%): Date: 08/29/23  Value 45.5  - HgbA1C:  Date:  Value  - Albumin:  Date: 08/29/23 Value 4.4  - BUN:  Date: 08/29/23  Value 10  - Creatinine:  Date: 08/29/23   Value 0.73    08/29/23 MRSA swab- negative    Coronary Artery Disease/HTN/CHF history: HTN  Cardiologist: No  Cardiac clearance necessary:  No  Date of cardiac clearance appt:  Final Cardiac recommendations: On any anticoagulation: No    Diabetes History:  No  Most recent HgbA1C:  Pulmonary:  COPD/Emphysema/Use of home oxygen: Patient has asthma  Alcohol use: Yes    BMI greater than 40 at time of scheduling: Additional medical concerns:   Additional recommendations for above concerns:  Attended Pre-Hab program:    Anticipated Discharge Disposition:  Home with OPT  Who will be with patient at home following discharge:  wife- she is retired and will be available to help him full time  Equipment patient already has:  sling, and he is buying a recliner  Bedroom on first or second floor:  first  [id number] on first or second floor:  first  Weight bearing status:  nwb RUE  Pre-op ambulatory status: no issues  Number of entry steps:  1 at the front  Caregiver assistance:  full time    Nataliia Low RN  Date:  08/29/23

## 2023-09-06 ENCOUNTER — TELEPHONE (OUTPATIENT)
Dept: ORTHOPEDIC SURGERY | Age: 68
End: 2023-09-06

## 2023-09-06 NOTE — TELEPHONE ENCOUNTER
General Question     Subject: patient has some concerns regarding his upcoming surgery on 09/15/23 on his total shoulder replacement he would like to know if he has and complications from his surgery and by it being done on a Friday who should he call  he just would like for the office to give him a call regarding this matter. Please Advise.   Patient Hilda Palomo  Contact Number: [phone]

## 2023-09-12 RX ORDER — OMEPRAZOLE 20 MG/1
20 CAPSULE, DELAYED RELEASE ORAL DAILY
COMMUNITY
Start: 2009-05-12 | End: 2023-09-12

## 2023-09-12 NOTE — PROGRESS NOTES
Place patient label inside box (if no patient label, complete below)  Name:  :    MR#:   Stephanie Brooke / PROCEDURE  I (we), ALIYA MANNING (Patient Name) authorize Chalo Hansen MD (Provider / Matt Kruger) and/or such assistants as may be selected by him/her, to perform the following operation/procedure(s): RIGHT TOTAL SHOULDER REPLACEMENT             Note: If unable to obtain consent prior to an emergent procedure, document the emergent reason in the medical record. This procedure has been explained to my (our) satisfaction and included in the explanation was: The intended benefit, nature, and extent of the procedure to be performed; The significant risks involved and the probability of success; Alternative procedures and methods of treatment; The dangers and probable consequences of such alternatives (including no procedure or treatment); The expected consequences of the procedure on my future health; Whether other qualified individuals would be performing important surgical tasks and/or whether  would be present to advise or support the procedure. I (we) understand that there are other risks of infection and other serious complications in the pre-operative/procedural and postoperative/procedural stages of my (our) care. I (we) have asked all of the questions which I (we) thought were important in deciding whether or not to undergo treatment or diagnosis. These questions have been answered to my (our) satisfaction. I (we) understand that no assurance can be given that the procedure will be a success, and no guarantee or warranty of success has been given to me (us). It has been explained to me (us) that during the course of the operation/procedure, unforeseen conditions may be revealed that necessitate extension of the original procedure(s) or different procedure(s) than those set forth in Paragraph 1.  I (we) authorize and request that

## 2023-09-12 NOTE — PROGRESS NOTES
Total Joint video emailed & reviewed to patient by Stef Arias. Hibiclens instructions reviewed to use x 5 days preop. CHEN screening done. CHEN screening score was 3. Patient has already had  UPPP in 1998. TJ book, IS instructions, TJ video link, and fall contract placed on chart for DOS. Patient is allergic to Vicodin and Codeine, but has had and tolerates Percocet. Does not have an allergy to Acetaminophen.   SC

## 2023-09-15 ENCOUNTER — ANESTHESIA (OUTPATIENT)
Dept: OPERATING ROOM | Age: 68
End: 2023-09-15
Payer: COMMERCIAL

## 2023-09-15 ENCOUNTER — ANESTHESIA EVENT (OUTPATIENT)
Dept: OPERATING ROOM | Age: 68
End: 2023-09-15
Payer: COMMERCIAL

## 2023-09-15 ENCOUNTER — APPOINTMENT (OUTPATIENT)
Dept: GENERAL RADIOLOGY | Age: 68
End: 2023-09-15
Attending: ORTHOPAEDIC SURGERY
Payer: COMMERCIAL

## 2023-09-15 ENCOUNTER — HOSPITAL ENCOUNTER (OUTPATIENT)
Age: 68
Setting detail: OUTPATIENT SURGERY
Discharge: HOME | End: 2023-09-15
Attending: ORTHOPAEDIC SURGERY | Admitting: ORTHOPAEDIC SURGERY
Payer: COMMERCIAL

## 2023-09-15 VITALS
HEIGHT: 68 IN | RESPIRATION RATE: 15 BRPM | TEMPERATURE: 97.4 F | DIASTOLIC BLOOD PRESSURE: 69 MMHG | SYSTOLIC BLOOD PRESSURE: 105 MMHG | BODY MASS INDEX: 26.25 KG/M2 | HEART RATE: 57 BPM | WEIGHT: 173.2 LBS | OXYGEN SATURATION: 92 %

## 2023-09-15 DIAGNOSIS — Z47.89 ORTHOPEDIC AFTERCARE: Primary | ICD-10-CM

## 2023-09-15 LAB
ABO + RH BLD: NORMAL
BLD GP AB SCN SERPL QL: NORMAL

## 2023-09-15 PROCEDURE — 2720000010 HC SURG SUPPLY STERILE: Performed by: ORTHOPAEDIC SURGERY

## 2023-09-15 PROCEDURE — 97116 GAIT TRAINING THERAPY: CPT

## 2023-09-15 PROCEDURE — 2500000003 HC RX 250 WO HCPCS: Performed by: ANESTHESIOLOGY

## 2023-09-15 PROCEDURE — 7100000000 HC PACU RECOVERY - FIRST 15 MIN: Performed by: ORTHOPAEDIC SURGERY

## 2023-09-15 PROCEDURE — 3600000014 HC SURGERY LEVEL 4 ADDTL 15MIN: Performed by: ORTHOPAEDIC SURGERY

## 2023-09-15 PROCEDURE — 86901 BLOOD TYPING SEROLOGIC RH(D): CPT

## 2023-09-15 PROCEDURE — 6360000002 HC RX W HCPCS: Performed by: ANESTHESIOLOGY

## 2023-09-15 PROCEDURE — 6360000002 HC RX W HCPCS: Performed by: NURSE ANESTHETIST, CERTIFIED REGISTERED

## 2023-09-15 PROCEDURE — C1713 ANCHOR/SCREW BN/BN,TIS/BN: HCPCS | Performed by: ORTHOPAEDIC SURGERY

## 2023-09-15 PROCEDURE — 86900 BLOOD TYPING SEROLOGIC ABO: CPT

## 2023-09-15 PROCEDURE — 97530 THERAPEUTIC ACTIVITIES: CPT

## 2023-09-15 PROCEDURE — 97535 SELF CARE MNGMENT TRAINING: CPT

## 2023-09-15 PROCEDURE — 7100000011 HC PHASE II RECOVERY - ADDTL 15 MIN: Performed by: ORTHOPAEDIC SURGERY

## 2023-09-15 PROCEDURE — 97166 OT EVAL MOD COMPLEX 45 MIN: CPT

## 2023-09-15 PROCEDURE — A4217 STERILE WATER/SALINE, 500 ML: HCPCS | Performed by: ORTHOPAEDIC SURGERY

## 2023-09-15 PROCEDURE — 3600000004 HC SURGERY LEVEL 4 BASE: Performed by: ORTHOPAEDIC SURGERY

## 2023-09-15 PROCEDURE — 2500000003 HC RX 250 WO HCPCS: Performed by: NURSE ANESTHETIST, CERTIFIED REGISTERED

## 2023-09-15 PROCEDURE — 7100000001 HC PACU RECOVERY - ADDTL 15 MIN: Performed by: ORTHOPAEDIC SURGERY

## 2023-09-15 PROCEDURE — 6370000000 HC RX 637 (ALT 250 FOR IP): Performed by: ORTHOPAEDIC SURGERY

## 2023-09-15 PROCEDURE — 6360000002 HC RX W HCPCS: Performed by: ORTHOPAEDIC SURGERY

## 2023-09-15 PROCEDURE — 7100000010 HC PHASE II RECOVERY - FIRST 15 MIN: Performed by: ORTHOPAEDIC SURGERY

## 2023-09-15 PROCEDURE — 2709999900 HC NON-CHARGEABLE SUPPLY: Performed by: ORTHOPAEDIC SURGERY

## 2023-09-15 PROCEDURE — 2580000003 HC RX 258: Performed by: ORTHOPAEDIC SURGERY

## 2023-09-15 PROCEDURE — 97161 PT EVAL LOW COMPLEX 20 MIN: CPT

## 2023-09-15 PROCEDURE — C9399 UNCLASSIFIED DRUGS OR BIOLOG: HCPCS | Performed by: NURSE ANESTHETIST, CERTIFIED REGISTERED

## 2023-09-15 PROCEDURE — C1776 JOINT DEVICE (IMPLANTABLE): HCPCS | Performed by: ORTHOPAEDIC SURGERY

## 2023-09-15 PROCEDURE — 86850 RBC ANTIBODY SCREEN: CPT

## 2023-09-15 PROCEDURE — 3700000001 HC ADD 15 MINUTES (ANESTHESIA): Performed by: ORTHOPAEDIC SURGERY

## 2023-09-15 PROCEDURE — 64415 NJX AA&/STRD BRCH PLXS IMG: CPT | Performed by: ANESTHESIOLOGY

## 2023-09-15 PROCEDURE — 6370000000 HC RX 637 (ALT 250 FOR IP): Performed by: ANESTHESIOLOGY

## 2023-09-15 PROCEDURE — C9290 INJ, BUPIVACAINE LIPOSOME: HCPCS | Performed by: ANESTHESIOLOGY

## 2023-09-15 PROCEDURE — 3700000000 HC ANESTHESIA ATTENDED CARE: Performed by: ORTHOPAEDIC SURGERY

## 2023-09-15 PROCEDURE — 73020 X-RAY EXAM OF SHOULDER: CPT

## 2023-09-15 PROCEDURE — 2580000003 HC RX 258: Performed by: FAMILY MEDICINE

## 2023-09-15 DEVICE — IMPLANTABLE DEVICE: Type: IMPLANTABLE DEVICE | Site: SHOULDER | Status: FUNCTIONAL

## 2023-09-15 DEVICE — COBALT G-HV BONE CEMENT 40GM
Type: IMPLANTABLE DEVICE | Site: SHOULDER | Status: FUNCTIONAL
Brand: DJO SURGICAL

## 2023-09-15 DEVICE — IMPL CAPPED SHOULDER S1 TOTAL STD ANATOMIC DJO: Type: IMPLANTABLE DEVICE | Site: SHOULDER | Status: FUNCTIONAL

## 2023-09-15 RX ORDER — HYDROMORPHONE HYDROCHLORIDE 1 MG/ML
0.25 INJECTION, SOLUTION INTRAMUSCULAR; INTRAVENOUS; SUBCUTANEOUS EVERY 5 MIN PRN
Status: DISCONTINUED | OUTPATIENT
Start: 2023-09-15 | End: 2023-09-15 | Stop reason: HOSPADM

## 2023-09-15 RX ORDER — PHENYLEPHRINE HYDROCHLORIDE 10 MG/ML
INJECTION INTRAVENOUS PRN
Status: DISCONTINUED | OUTPATIENT
Start: 2023-09-15 | End: 2023-09-15 | Stop reason: SDUPTHER

## 2023-09-15 RX ORDER — ASPIRIN 325 MG
325 TABLET ORAL 2 TIMES DAILY
Qty: 28 TABLET | Refills: 0 | Status: SHIPPED | OUTPATIENT
Start: 2023-09-15 | End: 2023-09-29

## 2023-09-15 RX ORDER — SUCCINYLCHOLINE/SOD CL,ISO/PF 200MG/10ML
SYRINGE (ML) INTRAVENOUS PRN
Status: DISCONTINUED | OUTPATIENT
Start: 2023-09-15 | End: 2023-09-15 | Stop reason: SDUPTHER

## 2023-09-15 RX ORDER — OXYCODONE HYDROCHLORIDE AND ACETAMINOPHEN 5; 325 MG/1; MG/1
1 TABLET ORAL EVERY 6 HOURS PRN
Qty: 28 TABLET | Refills: 0 | Status: SHIPPED | OUTPATIENT
Start: 2023-09-15 | End: 2023-09-22

## 2023-09-15 RX ORDER — VANCOMYCIN HYDROCHLORIDE 1 G/20ML
INJECTION, POWDER, LYOPHILIZED, FOR SOLUTION INTRAVENOUS PRN
Status: DISCONTINUED | OUTPATIENT
Start: 2023-09-15 | End: 2023-09-15 | Stop reason: HOSPADM

## 2023-09-15 RX ORDER — PROPOFOL 10 MG/ML
INJECTION, EMULSION INTRAVENOUS PRN
Status: DISCONTINUED | OUTPATIENT
Start: 2023-09-15 | End: 2023-09-15 | Stop reason: SDUPTHER

## 2023-09-15 RX ORDER — OXYCODONE HYDROCHLORIDE 5 MG/1
5 TABLET ORAL PRN
Status: COMPLETED | OUTPATIENT
Start: 2023-09-15 | End: 2023-09-15

## 2023-09-15 RX ORDER — SENNA AND DOCUSATE SODIUM 50; 8.6 MG/1; MG/1
1 TABLET, FILM COATED ORAL DAILY
Qty: 7 TABLET | Refills: 0 | Status: SHIPPED | OUTPATIENT
Start: 2023-09-15 | End: 2023-09-22

## 2023-09-15 RX ORDER — FENTANYL CITRATE 50 UG/ML
INJECTION, SOLUTION INTRAMUSCULAR; INTRAVENOUS
Status: COMPLETED | OUTPATIENT
Start: 2023-09-15 | End: 2023-09-15

## 2023-09-15 RX ORDER — SODIUM CHLORIDE 9 MG/ML
INJECTION, SOLUTION INTRAVENOUS PRN
Status: DISCONTINUED | OUTPATIENT
Start: 2023-09-15 | End: 2023-09-15 | Stop reason: HOSPADM

## 2023-09-15 RX ORDER — BUPIVACAINE HYDROCHLORIDE 5 MG/ML
INJECTION, SOLUTION EPIDURAL; INTRACAUDAL
Status: COMPLETED | OUTPATIENT
Start: 2023-09-15 | End: 2023-09-15

## 2023-09-15 RX ORDER — LABETALOL HYDROCHLORIDE 5 MG/ML
10 INJECTION, SOLUTION INTRAVENOUS
Status: DISCONTINUED | OUTPATIENT
Start: 2023-09-15 | End: 2023-09-15 | Stop reason: HOSPADM

## 2023-09-15 RX ORDER — FENTANYL CITRATE 50 UG/ML
INJECTION, SOLUTION INTRAMUSCULAR; INTRAVENOUS
Status: COMPLETED
Start: 2023-09-15 | End: 2023-09-15

## 2023-09-15 RX ORDER — BUPIVACAINE HYDROCHLORIDE 5 MG/ML
INJECTION, SOLUTION EPIDURAL; INTRACAUDAL
Status: COMPLETED
Start: 2023-09-15 | End: 2023-09-15

## 2023-09-15 RX ORDER — ACETAMINOPHEN 500 MG
1000 TABLET ORAL ONCE
Status: COMPLETED | OUTPATIENT
Start: 2023-09-15 | End: 2023-09-15

## 2023-09-15 RX ORDER — ONDANSETRON 2 MG/ML
INJECTION INTRAMUSCULAR; INTRAVENOUS PRN
Status: DISCONTINUED | OUTPATIENT
Start: 2023-09-15 | End: 2023-09-15 | Stop reason: SDUPTHER

## 2023-09-15 RX ORDER — CELECOXIB 200 MG/1
400 CAPSULE ORAL ONCE
Status: COMPLETED | OUTPATIENT
Start: 2023-09-15 | End: 2023-09-15

## 2023-09-15 RX ORDER — LIDOCAINE HYDROCHLORIDE 20 MG/ML
INJECTION, SOLUTION INTRAVENOUS PRN
Status: DISCONTINUED | OUTPATIENT
Start: 2023-09-15 | End: 2023-09-15 | Stop reason: SDUPTHER

## 2023-09-15 RX ORDER — DEXAMETHASONE SODIUM PHOSPHATE 4 MG/ML
INJECTION, SOLUTION INTRA-ARTICULAR; INTRALESIONAL; INTRAMUSCULAR; INTRAVENOUS; SOFT TISSUE PRN
Status: DISCONTINUED | OUTPATIENT
Start: 2023-09-15 | End: 2023-09-15 | Stop reason: SDUPTHER

## 2023-09-15 RX ORDER — FENTANYL CITRATE 50 UG/ML
25 INJECTION, SOLUTION INTRAMUSCULAR; INTRAVENOUS EVERY 5 MIN PRN
Status: DISCONTINUED | OUTPATIENT
Start: 2023-09-15 | End: 2023-09-15 | Stop reason: HOSPADM

## 2023-09-15 RX ORDER — PROCHLORPERAZINE EDISYLATE 5 MG/ML
5 INJECTION INTRAMUSCULAR; INTRAVENOUS
Status: DISCONTINUED | OUTPATIENT
Start: 2023-09-15 | End: 2023-09-15 | Stop reason: HOSPADM

## 2023-09-15 RX ORDER — SODIUM CHLORIDE 0.9 % (FLUSH) 0.9 %
5-40 SYRINGE (ML) INJECTION PRN
Status: DISCONTINUED | OUTPATIENT
Start: 2023-09-15 | End: 2023-09-15 | Stop reason: HOSPADM

## 2023-09-15 RX ORDER — ROCURONIUM BROMIDE 10 MG/ML
INJECTION, SOLUTION INTRAVENOUS PRN
Status: DISCONTINUED | OUTPATIENT
Start: 2023-09-15 | End: 2023-09-15 | Stop reason: SDUPTHER

## 2023-09-15 RX ORDER — PREGABALIN 150 MG/1
150 CAPSULE ORAL ONCE
Status: COMPLETED | OUTPATIENT
Start: 2023-09-15 | End: 2023-09-15

## 2023-09-15 RX ORDER — SODIUM CHLORIDE, SODIUM LACTATE, POTASSIUM CHLORIDE, CALCIUM CHLORIDE 600; 310; 30; 20 MG/100ML; MG/100ML; MG/100ML; MG/100ML
INJECTION, SOLUTION INTRAVENOUS CONTINUOUS
Status: DISCONTINUED | OUTPATIENT
Start: 2023-09-15 | End: 2023-09-15 | Stop reason: HOSPADM

## 2023-09-15 RX ORDER — DOXYCYCLINE HYCLATE 100 MG
100 TABLET ORAL 2 TIMES DAILY
Qty: 10 TABLET | Refills: 0 | Status: SHIPPED | OUTPATIENT
Start: 2023-09-15 | End: 2023-09-20

## 2023-09-15 RX ORDER — OXYCODONE HYDROCHLORIDE 5 MG/1
10 TABLET ORAL PRN
Status: COMPLETED | OUTPATIENT
Start: 2023-09-15 | End: 2023-09-15

## 2023-09-15 RX ORDER — SODIUM CHLORIDE 0.9 % (FLUSH) 0.9 %
5-40 SYRINGE (ML) INJECTION EVERY 12 HOURS SCHEDULED
Status: DISCONTINUED | OUTPATIENT
Start: 2023-09-15 | End: 2023-09-15 | Stop reason: HOSPADM

## 2023-09-15 RX ADMIN — CEFTRIAXONE SODIUM 2000 MG: 2 INJECTION, POWDER, FOR SOLUTION INTRAMUSCULAR; INTRAVENOUS at 08:18

## 2023-09-15 RX ADMIN — PHENYLEPHRINE HYDROCHLORIDE 50 MCG: 10 INJECTION INTRAVENOUS at 10:01

## 2023-09-15 RX ADMIN — FENTANYL CITRATE 100 MCG: 50 INJECTION, SOLUTION INTRAMUSCULAR; INTRAVENOUS at 06:54

## 2023-09-15 RX ADMIN — PHENYLEPHRINE HYDROCHLORIDE 50 MCG: 10 INJECTION INTRAVENOUS at 09:08

## 2023-09-15 RX ADMIN — ROCURONIUM BROMIDE 5 MG: 10 INJECTION, SOLUTION INTRAVENOUS at 08:15

## 2023-09-15 RX ADMIN — ROCURONIUM BROMIDE 45 MG: 10 INJECTION, SOLUTION INTRAVENOUS at 08:25

## 2023-09-15 RX ADMIN — BUPIVACAINE HYDROCHLORIDE 10 ML: 5 INJECTION, SOLUTION EPIDURAL; INTRACAUDAL; PERINEURAL at 06:54

## 2023-09-15 RX ADMIN — PHENYLEPHRINE HYDROCHLORIDE 50 MCG: 10 INJECTION INTRAVENOUS at 08:55

## 2023-09-15 RX ADMIN — CELECOXIB 400 MG: 200 CAPSULE ORAL at 06:41

## 2023-09-15 RX ADMIN — PHENYLEPHRINE HYDROCHLORIDE 50 MCG: 10 INJECTION INTRAVENOUS at 08:46

## 2023-09-15 RX ADMIN — HYDROMORPHONE HYDROCHLORIDE 0.25 MG: 1 INJECTION, SOLUTION INTRAMUSCULAR; INTRAVENOUS; SUBCUTANEOUS at 11:46

## 2023-09-15 RX ADMIN — VANCOMYCIN HYDROCHLORIDE 1250 MG: 10 INJECTION, POWDER, LYOPHILIZED, FOR SOLUTION INTRAVENOUS at 08:22

## 2023-09-15 RX ADMIN — OXYCODONE HYDROCHLORIDE 5 MG: 5 TABLET ORAL at 12:06

## 2023-09-15 RX ADMIN — HYDROMORPHONE HYDROCHLORIDE 0.25 MG: 1 INJECTION, SOLUTION INTRAMUSCULAR; INTRAVENOUS; SUBCUTANEOUS at 11:58

## 2023-09-15 RX ADMIN — DEXAMETHASONE SODIUM PHOSPHATE 4 MG: 4 INJECTION, SOLUTION INTRAMUSCULAR; INTRAVENOUS at 08:25

## 2023-09-15 RX ADMIN — LIDOCAINE HYDROCHLORIDE 100 MG: 20 INJECTION, SOLUTION INTRAVENOUS at 08:11

## 2023-09-15 RX ADMIN — PHENYLEPHRINE HYDROCHLORIDE 50 MCG: 10 INJECTION INTRAVENOUS at 09:47

## 2023-09-15 RX ADMIN — ONDANSETRON 4 MG: 2 INJECTION INTRAMUSCULAR; INTRAVENOUS at 08:25

## 2023-09-15 RX ADMIN — SODIUM CHLORIDE, SODIUM LACTATE, POTASSIUM CHLORIDE, AND CALCIUM CHLORIDE: .6; .31; .03; .02 INJECTION, SOLUTION INTRAVENOUS at 07:45

## 2023-09-15 RX ADMIN — SUGAMMADEX 300 MG: 100 INJECTION, SOLUTION INTRAVENOUS at 10:40

## 2023-09-15 RX ADMIN — ACETAMINOPHEN 1000 MG: 500 TABLET ORAL at 06:41

## 2023-09-15 RX ADMIN — Medication 160 MG: at 08:15

## 2023-09-15 RX ADMIN — PROPOFOL 150 MG: 10 INJECTION, EMULSION INTRAVENOUS at 08:14

## 2023-09-15 RX ADMIN — ROCURONIUM BROMIDE 20 MG: 10 INJECTION, SOLUTION INTRAVENOUS at 09:00

## 2023-09-15 RX ADMIN — ROCURONIUM BROMIDE 20 MG: 10 INJECTION, SOLUTION INTRAVENOUS at 09:35

## 2023-09-15 RX ADMIN — ROCURONIUM BROMIDE 10 MG: 10 INJECTION, SOLUTION INTRAVENOUS at 09:46

## 2023-09-15 RX ADMIN — PHENYLEPHRINE HYDROCHLORIDE 50 MCG: 10 INJECTION INTRAVENOUS at 08:37

## 2023-09-15 RX ADMIN — PREGABALIN 150 MG: 150 CAPSULE ORAL at 06:42

## 2023-09-15 RX ADMIN — BUPIVACAINE 10 ML: 13.3 INJECTION, SUSPENSION, LIPOSOMAL INFILTRATION at 06:54

## 2023-09-15 RX ADMIN — SODIUM CHLORIDE, SODIUM LACTATE, POTASSIUM CHLORIDE, AND CALCIUM CHLORIDE: .6; .31; .03; .02 INJECTION, SOLUTION INTRAVENOUS at 09:02

## 2023-09-15 ASSESSMENT — PAIN DESCRIPTION - LOCATION
LOCATION: ARM;OTHER (COMMENT)
LOCATION: ARM;OTHER (COMMENT)

## 2023-09-15 ASSESSMENT — PAIN - FUNCTIONAL ASSESSMENT
PAIN_FUNCTIONAL_ASSESSMENT: ACTIVITIES ARE NOT PREVENTED
PAIN_FUNCTIONAL_ASSESSMENT: ACTIVITIES ARE NOT PREVENTED
PAIN_FUNCTIONAL_ASSESSMENT: 0-10
PAIN_FUNCTIONAL_ASSESSMENT: ACTIVITIES ARE NOT PREVENTED

## 2023-09-15 ASSESSMENT — PAIN DESCRIPTION - ORIENTATION
ORIENTATION: RIGHT

## 2023-09-15 ASSESSMENT — PAIN DESCRIPTION - DESCRIPTORS
DESCRIPTORS: SHARP

## 2023-09-15 ASSESSMENT — PAIN SCALES - GENERAL
PAINLEVEL_OUTOF10: 4
PAINLEVEL_OUTOF10: 0
PAINLEVEL_OUTOF10: 5
PAINLEVEL_OUTOF10: 0
PAINLEVEL_OUTOF10: 5

## 2023-09-15 ASSESSMENT — PAIN DESCRIPTION - PAIN TYPE: TYPE: SURGICAL PAIN

## 2023-09-15 ASSESSMENT — PAIN DESCRIPTION - FREQUENCY: FREQUENCY: CONTINUOUS

## 2023-09-15 NOTE — ANESTHESIA PRE PROCEDURE
Department of Anesthesiology  Preprocedure Note       Name:  Ana Ravi   Age:  77 y.o.  :  1955                                          MRN:  [id number]         Date:  9/15/2023      Surgeon: Yohana Foster):  Penelope Hidalgo MD    Procedure: Procedure(s):  RIGHT TOTAL SHOULDER REPLACEMENT    Medications prior to admission:   Prior to Admission medications    Medication Sig Start Date End Date Taking? Authorizing Provider   aspirin (YORDY ASPIRIN) 325 MG tablet Take 1 tablet by mouth in the morning and at bedtime for 14 days 9/15/23 9/29/23 Yes Troy Barrier, DO   doxycycline hyclate (VIBRA-TABS) 100 MG tablet Take 1 tablet by mouth 2 times daily for 5 days 9/15/23 9/20/23 Yes Troy Barrier, DO   sennosides-docusate sodium (SENOKOT-S) 8.6-50 MG tablet Take 1 tablet by mouth daily for 7 days 9/15/23 9/22/23 Yes Troy Barrier, DO   oxyCODONE-acetaminophen (PERCOCET) 5-325 MG per tablet Take 1 tablet by mouth every 6 hours as needed for Pain for up to 7 days. Intended supply: 7 days. Take lowest dose possible to manage pain Max Daily Amount: 4 tablets 9/15/23 9/22/23 Yes Troy Pinead, DO   ascorbic acid (VITAMIN C) 500 MG tablet Take 2 tablets by mouth in the morning and at bedtime    Historical Provider, MD   albuterol sulfate HFA (PROVENTIL;VENTOLIN;PROAIR) 108 (90 Base) MCG/ACT inhaler Inhale 2 puffs into the lungs every 4 hours as needed for Wheezing    Historical Provider, MD   albuterol (PROVENTIL) (2.5 MG/3ML) 0.083% nebulizer solution Take 3 mLs by nebulization every 4 hours as needed for Wheezing    Historical Provider, MD   hydrOXYzine (ATARAX) 25 MG tablet Take 1 tablet by mouth nightly as needed for Anxiety    Historical Provider, MD   ammonium lactate (LAC-HYDRIN) 12 % lotion Apply topically as needed for Dry Skin Apply topically as needed.     Historical Provider, MD   vitamin E 400 UNIT capsule Take 1 capsule by mouth daily    Historical Provider, MD   Omega-3 Fatty Acids (FISH OIL) 500 MG CAPS

## 2023-09-15 NOTE — ANESTHESIA PROCEDURE NOTES
Peripheral Block    Patient location during procedure: pre-op  Reason for block: post-op pain management and at surgeon's request  Start time: 9/15/2023 6:54 AM  End time: 9/15/2023 7:03 AM  Staffing  Performed: anesthesiologist   Anesthesiologist: Adilene Patricio DO  Performed by: Adilene Patricio DO  Authorized by: Adilene Patricio DO    Preanesthetic Checklist  Completed: patient identified, IV checked, site marked, risks and benefits discussed, surgical/procedural consents, equipment checked, pre-op evaluation, timeout performed, anesthesia consent given, oxygen available, monitors applied/VS acknowledged, fire risk safety assessment completed and verbalized and blood product R/B/A discussed and consented  Peripheral Block   Patient position: sitting  Prep: ChloraPrep  Provider prep: mask and sterile gloves  Patient monitoring: cardiac monitor, continuous pulse ox, continuous capnometry, frequent blood pressure checks, IV access, oxygen and responsive to questions  Block type: Brachial plexus  Interscalene  Laterality: right  Injection technique: single-shot  Guidance: ultrasound guided  Local infiltration: bupivacaine  Local infiltration: bupivacaine    Needle   Needle type: insulated echogenic nerve stimulator needle   Needle gauge: 20 G  Needle localization: ultrasound guidance  Needle length: 8 cm  Assessment   Injection assessment: negative aspiration for heme, no paresthesia on injection, local visualized surrounding nerve on ultrasound and no intravascular symptoms  Paresthesia pain: none  Slow fractionated injection: yes  Hemodynamics: stable  Real-time US image taken/store: yes  Outcomes: uncomplicated    Medications Administered  fentaNYL (SUBLIMAZE) injection - IntraVENous   100 mcg - 9/15/2023 6:54:00 AM  bupivacaine (MARCAINE) PF injection 0.5% - Perineural   10 mL - 9/15/2023 6:54:00 AM  bupivacaine liposome (EXPAREL) injection 1.3% - Perineural   10 mL - 9/15/2023 6:54:00

## 2023-09-15 NOTE — INTERVAL H&P NOTE
Update History & Physical    The patient's History and Physical of August 22, 2023 was reviewed with the patient and I examined the patient. There was no change. The surgical site was confirmed by the patient and me. Plan: The risks, benefits, expected outcome, and alternative to the recommended procedure have been discussed with the patient. Patient understands and wants to proceed with the procedure.      Electronically signed by Radha Radford DO on 9/15/2023 at 5:53 AM

## 2023-09-15 NOTE — PROGRESS NOTES
1050 Admitted to PACU from OR. Connected to monitor. Report at bedside. Oral airway in place. No sign of pain or nausea.

## 2023-09-15 NOTE — BRIEF OP NOTE
Brief Postoperative Note      Patient: Noelia Boxer  YOB: 1955  MRN: [id number]    Date of Procedure: 9/15/2023    Pre-Op Diagnosis Codes:     * Primary osteoarthritis, right shoulder [M19.011]    Post-Op Diagnosis: Same       Procedure(s):  RIGHT TOTAL SHOULDER REPLACEMENT WITH PATIENT SPECIFIC INSTRUMENTATION, OPEN BICEPS TENODESIS    Surgeon(s):  Joni Jama MD    Assistant:  Surgical Assistant: Sagar Peguero  Fellow: Lan Kramer DO    Anesthesia: General    Estimated Blood Loss (mL): 743     Complications: None    Specimens:   * No specimens in log *    Implants:  Implant Name Type Inv.  Item Serial No.  Lot No. LRB No. Used Action   CEMENT BNE 40GM W/ GENT HI VISC CO - [id number]  CEMENT BNE 40GM W/ GENT HI VISC CO  [employer] - [facility] SURGICAL- [id number] Right 1 Implanted   HEAD HUM L50MM OD20MM SHLDR OFFSET IRIS ALTIVATE - [id number]  HEAD HUM L50MM OD20MM SHLDR OFFSET IRIS ALTIVATE   [employer] [facility] [id number] Right 1 Implanted   NECK HUM NEUT SHLDR IRIS ALTIVATE - [id number]  NECK HUM NEUT SHLDR IRIS ALTIVATE  [employer] [facility] [id number] Right 1 Implanted   STEM HUM L14MM SHT SHLDR IRIS ALTIVATE - [id number]  STEM HUM L14MM SHT SHLDR IRIS ALTIVATE  [employer] [facility] [id number] Right 1 Implanted   AUGUMENTED PEGGED GLENOID     [id number] Right 1 Implanted         Drains: * No LDAs found *    Findings: Primary OA Right shoulder      Electronically signed by Lan Kramer DO on 9/15/2023 at 11:53 AM

## 2023-09-15 NOTE — PROGRESS NOTES
Procedure: ASA 2 peripheral block  MD: Dr. Rosalinda Ramos performed. 0654  Pt monitored closely on heart monitor, 2L NC, continuous pulse oximetry, EtCO2, and frequent BPs. Pt remained alert and oriented x4. pt tolerated procedure well.

## 2023-09-15 NOTE — PROGRESS NOTES
Pain in axilla. 5/10. Fentanyl ordered. HR 40s and 50s. Dr Sky Vanessa called. 0.25 Dilaudid ordered for pain if HR<60. Possible allergic r/t being allergic to Codeine. Patient unsure if had dilaudid before. Dr Sky Vanessa called again. OK to order/give. 0.25 Dilaudid given.

## 2023-09-15 NOTE — PROGRESS NOTES
Occupational Therapy  Facility/Department: [address] [facility] [place]  Occupational Therapy Initial Assessment/Tx/Discharge Note    Name: Lila Devlin  : 1955  MRN: [id number]  Date of Service: 9/15/2023    Discharge Recommendations:  24 hour supervision or assist  OT Equipment Recommendations  Equipment Needed: No     Assessment: Pt tolerated therapy relatively well POD #0. He experienced mild lightheadedness but was able to complete all necessary mobility with CGA. He is able to use his cane this evening at home for safety. He is familiar with immobilizer and ADL techniques from a prior shoulder surgery and will have assist of wife at home. Recommend close 24 hr A initially. Lila Devlin scored a 19/24 on the AM-PAC ADL Inpatient form. At this time, no further OT is recommended upon discharge. Recommend patient returns to prior setting with initial 24 hr A. Assessment   Decision Making: Medium Complexity  No Skilled OT: Safe to return home  REQUIRES OT FOLLOW-UP: No  Activity Tolerance  Activity Tolerance: Patient Tolerated treatment well  Activity Tolerance Comments: Mild lightheadedness throughout, not worsening. BP WFL. Plan  D/C OT services        Subjective   General  Chart Reviewed: Yes  Additional Pertinent Hx: 77 y.o. M s/p R TSA 9/15. Family / Caregiver Present: No  Referring Practitioner: Lisa  Subjective  Subjective: Pt in bed on arrival. Pleasant and cooperative with therapy. General Comment  Comments: having pain medial upper arm, RN aware and recently medicated, ice encouraged and provided.      Social/Functional History  Social/Functional History  Lives With: Spouse  Type of Home: House  Home Layout: One level  Home Access: Stairs to enter with rails  Entrance Stairs - Number of Steps: 1 porch step + treshold  Bathroom Shower/Tub: Tub/Shower unit  Bathroom Toilet: Standard  Bathroom Equipment: Shower chair, Grab bars in shower, Toilet raiser  Home Equipment: [place], WNL  Orientation  Overall Orientation Status: Within Normal Limits        Exercise Treatment: Educated on appropriate post-op exercises, excluding any shoulder ROM - verb understanding  Education Given To: Patient  Education Provided: Role of Therapy;Plan of Care;Precautions; ADL Adaptive Strategies;Transfer Training;Home Exercise Program;Energy Conservation; Fall Prevention Strategies  Education Method: Verbal  Barriers to Learning: None  Education Outcome: Verbalized understanding           Hand Dominance  Hand Dominance: Right                AM-PAC Score        AM-[facility] Inpatient Daily Activity Raw Score: 19 (09/15/23 1349)  AM-PAC Inpatient ADL T-Scale Score : 40.22 (09/15/23 1349)  ADL Inpatient CMS 0-100% Score: 42.8 (09/15/23 1349)  ADL Inpatient CMS G-Code Modifier : CK (09/15/23 1349)      Therapy Time   Individual Concurrent Group Co-treatment   Time In 1220         Time Out 1258         Minutes 38          Timed Code Tx Min: 23  Total Tx Time: [address] [facility],

## 2023-09-15 NOTE — ANESTHESIA POSTPROCEDURE EVALUATION
Department of Anesthesiology  Postprocedure Note    Patient: Erika Karimi  MRN: [id number]  [address] 1955  Date of evaluation: 9/15/2023      Procedure Summary     Date: 09/15/23 Room / Location: [address] [address] Critical access hospital    Anesthesia Start: 3824 Anesthesia Stop: 2930    Procedure: RIGHT TOTAL SHOULDER REPLACEMENT WITH PATIENT SPECIFIC INSTRUMENTATION, OPEN BICEPS TENODESIS (Right) Diagnosis:       Primary osteoarthritis, right shoulder      (Primary osteoarthritis, right shoulder [M19.011])    Surgeons: Lisandro Sands MD Responsible Provider: Emi Thomas DO    Anesthesia Type: General ASA Status: 2          Anesthesia Type: General    Yecenia Phase I: Yecenia Score: 10    Yecenia Phase II: Yecenia Score: 9      Anesthesia Post Evaluation    Patient location during evaluation: PACU  Patient participation: complete - patient participated  Level of consciousness: awake and awake and alert  Pain score: 0  Airway patency: patent  Nausea & Vomiting: no nausea and no vomiting  Complications: no  Cardiovascular status: hemodynamically stable  Respiratory status: acceptable  Hydration status: euvolemic  Pain management: adequate and satisfactory to patient

## 2023-09-15 NOTE — PROGRESS NOTES
Physical Therapy  Facility/Department: [facility]  Physical Therapy Initial Assessment / Treatment / Discharge    Name: Fred Cordero  : 1955  MRN: [id number]  Date of Service: 9/15/2023    Discharge Recommendations: Fred Cordero scored a 18/24 on the AM-PAC short mobility form. Current research shows that an AM-PAC score of 18 or greater is typically associated with a discharge to the patient's home setting. Based on the patient's AM-PAC score and their current functional mobility deficits, it is recommended that the patient have 2-3 sessions per week of Physical Therapy at d/c to increase the patient's independence. At this time, this patient demonstrates the endurance and safety to discharge home with OP services and a follow up treatment frequency of 2-3x/wk. Please see assessment section for further patient specific details. If patient discharges prior to next session this note will serve as a discharge summary. Please see below for the latest assessment towards goals. PT Equipment Recommendations  Equipment Needed: No      Patient Diagnosis(es): The encounter diagnosis was Orthopedic aftercare. Past Medical History:  has a past medical history of Arthritis, Bronchitis, Diverticulitis, Hyperlipidemia, Hypertension, Kidney stone, and Prolonged emergence from general anesthesia. Past Surgical History:  has a past surgical history that includes Heel spur surgery; Total knee arthroplasty; Total shoulder arthroplasty (Left, ); Nasal septum surgery; hernia repair; joint replacement (); and UPPP (). Assessment   Assessment: Pt is 77 y.o. male POD #0 s/p R reverse TSA. Pt is from home with family. Required min A initially and progressed to CGA for functional mobility. Gait quality improved with use of cane. Rec 24hr assist and use of cane initially at home (pt owns cane). Family can provide assist.  Will sign off 2* pt plans to d/c home today.   Decision Making: Low

## 2023-09-16 NOTE — OP NOTE
[facility] positioner in a semi-sitting  position. Trunk was elevated to 45 degrees. All pressure points were  padded. The patient's right shoulder and arm were prepped and draped in  a standard manner for shoulder replacement surgery. We used [place] to  cover the operative field. We used a [employer] Spider arm rolon to set the  arm in position. All members of the surgical team wore body exhaust  suits. We approached the shoulder through a 10-11 cm oblique incision  from the coracoid coursing down towards the level of the deltoid  tubercle. The incision was carried down with a skin knife through the  skin and subcutaneous tissue. Electrocautery was used to establish  hemostasis. Gelpi retractor was placed. Deltopectoral interval was  identified and developed with a combination of blunt and sharp  dissection. The cephalic vein was retracted laterally. Couple of  crossing vessels were ligated with cautery. We released subdeltoid and  subacromial adhesions. We excised portion of thickened clavipectoral  fossa. We opened up the transverse humeral ligament. The biceps was  tendinopathic. It was finally tenodesed to the upper border of the  pectoralis. This was done using three #2 Ethibond figure-of-eight  stitches. The tendon proximal was excised along with all the  tenosynovitis. We evacuated a large effusion in the humeral head. We  took down subscapularis sharply with cautery. Cauterized the anterior  cervical vessel, released the inferior capsule. We used a Key elevator  to shift through the capsule and dislocate the humeral head. We used  half-inch curved osteotome and a rongeur to remove the osteophytes from  anterior medial to posterior medial.  We then placed our cutting guide  in appropriate height and retroversion, pinned the guide in place. We  used the oscillating saw to resect the humeral head. We took a generous  cut.   We then used a calcar planar a little bit later on to make sure  that we had maximal exposure. Peripheral osteophytes that did not  appear posteriorly were now in view, and these were removed with a  half-inch curved osteotome and a rongeur. We inspected the rotator cuff  again. We were happy with the integrity of the rotator cuff. We then  prepared the humerus. We used the canal finder to find the canal,  reamed up to size 16, broached up to size 14, which fit well. We  reassessed the broach and used a calcar planar to smooth out some of the  bone and make sure we were right up against the articular margin of the  rotator cuff. We placed a bone protector screw there and then turned  out attention towards the glenoid. We excised some of the anterior  capsule and excised the rotator interval scarring. Coracohumeral  ligament was released. We tagged the subscapularis. We placed a Fukuda  retractor to retract the humeral head. Placed an anterior glenoid  retractor. We excised the labrum and the biceps stump  circumferentially. We did a 270-degree release superiorly and  posteriorly. We used cautery right at the edge of the glenoid and  released the capsule. Inferior capsule was not excised. We then  trialed with a PSI guide, but it fit a little bit irregularly likely  because of the amount of retroversion. So, we used the circuit as a  guide and drilled freehand. We used the neutral translucent sizer, and  we could see that we were right up against the paleo glenoid. We then  reamed with a cannulated paleo reamer, reamed just a little bit. We got  down to subchondral bone just focally. Because it was quite thin, we  were able to use a Ambrose elevator to remove some of the residual  cartilage. We then followed the steps for the half-wedge preparation,  placing the guide after drilling to control its rotation and drilling  the anterior peg hole with a conventional guide. We now used the MP  reamer.   We were able to angulate now and despite the sclerotic

## 2023-09-18 ENCOUNTER — TELEPHONE (OUTPATIENT)
Dept: ORTHOPEDIC SURGERY | Age: 68
End: 2023-09-18

## 2023-09-18 NOTE — TELEPHONE ENCOUNTER
Spoke with patient    Incision status: No drainage or redness    Edema/Swelling/Teds: No swelling at this time. Pain level and status: Pain is very manageable today. Use of pain medications: Percocet 5/325 q6h prn    Blood thinner: Aspirin 325 mg BID    Bowels: He has had a couple of small bowel movements and feels a little constipated. He is starting Colace today, and will drink plenty of fluids. Home Care Agency active: No    Outpatient therapy: Hasn't started yet    Do you have all of your medications: Yes    Changes in medications: No    Instructed pt to call Nurse Navigator or surgeon's office with any questions or concerns.      Follow up appointments:    Future Appointments   Date Time Provider [facility] [address]   9/20/2023 11:00 AM Bairon Madrid MD [facility]

## 2023-09-20 ENCOUNTER — OFFICE VISIT (OUTPATIENT)
Dept: ORTHOPEDIC SURGERY | Age: 68
End: 2023-09-20

## 2023-09-20 VITALS — WEIGHT: 173 LBS | BODY MASS INDEX: 26.22 KG/M2 | HEIGHT: 68 IN

## 2023-09-20 DIAGNOSIS — Z96.611 HISTORY OF TOTAL REPLACEMENT OF RIGHT SHOULDER JOINT: Primary | ICD-10-CM

## 2023-09-20 NOTE — PROGRESS NOTES
History of Present Illness:  Alexy Mcgraw is a 77 y.o. male who presents for a post operative visit. The patient underwent a right anatomic total shoulder arthroplasty on 9/15/2023 by Dr. Bailee Valle. Overall he is doing okay and feels that their pain is well controlled with current pain medications. He has been compliant with wearing the UltraSling brace at all times. The patient denies any fevers, chills, numbness, tingling, and shortness of breath. Medical History:  Patient's medications, allergies, past medical, surgical, social and family histories were reviewed and updated as appropriate. No notes on file    Review of Systems  A 14 point review of systems was completed by the patient is available in the media section of the scanned medical record and was reviewed on 9/20/2023. Vital Signs: There were no vitals filed for this visit. General/Appearance: Alert and oriented and in no apparent distress. Skin:  There are no skin lesions, cellulitis, or extreme edema. The patient has warm and well-perfused Bilateral upper extremities with brisk capillary refill. Right Shoulder Exam:    Inspection: right shoulder incision that is clean, dry and intact and well approximated. The Prineo dressing is still in place. Mild ecchymosis and swelling are present as can be expected. There is no erythema, drainage or other signs of infection    Palpation:  No crepitus to gentle motion    Active Range of Motion: Deferred    Passive Range of Motion: Forward elevation of 30 and external rotation of 5    Strength:  Deferred    Special Tests:  Deferred. Neurovascular: Sensation to light touch is intact, no motor deficits, palpable radial pulses 2+    Radiology:     Plain radiographs of the right shoulder comprising 2 views: AP and axillary lateral were obtained and reviewed in the office: Shows postsurgical changes from the total shoulder replacement.   All the components are in good placement without

## 2023-09-27 ENCOUNTER — HOSPITAL ENCOUNTER (OUTPATIENT)
Dept: PHYSICAL THERAPY | Age: 68
Setting detail: THERAPIES SERIES
Discharge: HOME | End: 2023-09-27
Payer: COMMERCIAL

## 2023-09-27 DIAGNOSIS — M25.511 ACUTE PAIN OF RIGHT SHOULDER: Primary | ICD-10-CM

## 2023-09-27 DIAGNOSIS — R53.1 WEAKNESS: ICD-10-CM

## 2023-09-27 PROCEDURE — 97162 PT EVAL MOD COMPLEX 30 MIN: CPT

## 2023-09-27 PROCEDURE — 97110 THERAPEUTIC EXERCISES: CPT

## 2023-09-27 PROCEDURE — 97530 THERAPEUTIC ACTIVITIES: CPT

## 2023-09-27 NOTE — PLAN OF CARE
Upper Extremity Functional Scale to assist with return top prior level of function. Status: [] Progressing: [] Met: [] Not Met: [] Adjusted  Improve shoulder AROM to 140 degrees or  better in right shoulder flexion to allow for proper joint functioning as indicated by patients functional deficits. Status: [] Progressing: [] Met: [] Not Met: [] Adjusted  Pt to improve strength to 4+/5 or better of right shoulder flexion, abduction, IR and ER to allow for proper muscle and joint use in functional mobility, ADLs and prior level of function   Status: [] Progressing: [] Met: [] Not Met: [] Adjusted  Patient will return to Reaching activities without increased symptoms or restriction to work towards return to prior level of function. Status: [] Progressing: [] Met: [] Not Met: [] Adjusted  Patient will increase UE function to allow independence in all OH self-care activities. Status: [] Progressing: [] Met: [] Not Met: [] Adjusted    TREATMENT PLAN     Frequency/Duration: 1-2x/week for  12  weeks for the following treatment interventions:    Interventions:  [x] Therapeutic exercise including: strength training, ROM, including postural re-education. [x] NMR activation and proprioception, including postural re-education. [x] Manual therapy as indicated to include: PROM, Gr I-IV mobilizations, and STM  [x] Modalities as needed that may include: Cryotherapy  [x] Patient education on joint protection, postural re-education, activity modification, progression of HEP. HEP instruction:   Patient instructed on HEP on this date with handout provided and all questions answered. Discussions about how to progress sets/reps/resistance as necessary for fatigue and challenge. Patient was instructed to contact PT with any questions or concerns about HEP moving forward. Patient verbally stated she/he understood. Access Code: [email or web address]  URL: [email or web address]  Date: 09/27/2023  Prepared by:  Nathaniel Alba

## 2023-09-27 NOTE — FLOWSHEET NOTE
kinesthetic sense, posture, and/or proprioception for sitting and/or standing activities    (19308) THERAPEUTIC ACTIVITY- use of dynamic activities to improve functional performance. (Ex include squatting, ascending/descending stairs, walking, bending, lifting, catching, throwing, pushing, pulling, jumping.)  Direct, one on one contact, billed in 15-minute increments. (22146) MANUAL THERAPY-  Manual therapy techniques, 1 or more regions, each 15 minutes (Mobilization/manipulation, manual lymphatic drainage, manual traction) for the purpose of modulating pain, promoting relaxation,  increasing ROM, reducing/eliminating soft tissue swelling/inflammation/restriction, improving soft tissue extensibility and allowing for proper ROM for normal function with self care, mobility, lifting and ambulation    TREATMENT PLAN   Plan: POC Initiated today- see eval for details    Electronically Signed by Rosalino Lynn, PT, DPT, TRISTAN              Date: 09/27/2023     Note: If patient does not return for scheduled/recommended follow up visits, this note will serve as a discharge from care along with the most recent update on progress.

## 2023-10-02 ENCOUNTER — TELEPHONE (OUTPATIENT)
Dept: ORTHOPEDIC SURGERY | Age: 68
End: 2023-10-02

## 2023-10-02 ENCOUNTER — OFFICE VISIT (OUTPATIENT)
Dept: ORTHOPEDIC SURGERY | Age: 68
End: 2023-10-02

## 2023-10-02 VITALS — BODY MASS INDEX: 26.22 KG/M2 | HEIGHT: 68 IN | WEIGHT: 173 LBS

## 2023-10-02 DIAGNOSIS — Z96.611 HISTORY OF TOTAL REPLACEMENT OF RIGHT SHOULDER JOINT: Primary | ICD-10-CM

## 2023-10-02 PROCEDURE — 99024 POSTOP FOLLOW-UP VISIT: CPT | Performed by: PHYSICIAN ASSISTANT

## 2023-10-04 NOTE — PROGRESS NOTES
History of Present Illness:  Julianna Moy is a 77 y.o. male who presents for a post operative visit. The patient underwent a right anatomic total shoulder arthroplasty on 9/15/2023. Patient reports no new injuries since his last visit. He has been wearing his sling around-the-clock and has been going to therapy once a week. The patient deny fevers, chills, numbness, tingling, and shortness of breath. Medical History:  Patient's medications, allergies, past medical, surgical, social and family histories were reviewed and updated as appropriate. No notes on file    Review of Systems  A 14 point review of systems was completed by the patient is available in the media section of the scanned medical record and was reviewed on 10/4/2023. Vital Signs: There were no vitals filed for this visit. General/Appearance: Alert and oriented and in no apparent distress. Skin:  There are no skin lesions, cellulitis, or extreme edema. The patient has warm and well-perfused Bilateral upper extremities with brisk capillary refill. RIGHT Shoulder Exam:    Inspection: RIGHT shoulder incision that is clean, dry and intact and well approximated. The Prineo dressing is still in place. Mild ecchymosis and swelling are present as can be expected. There is no erythema, drainage or other signs of infection    Palpation:  No crepitus to gentle motion    Active Range of Motion: Deferred    Passive Range of Motion: Forward elevation 40 degrees and external rotation of 5    Strength:  Deferred    Special Tests:  Deferred. Neurovascular: Sensation to light touch is intact, no motor deficits, palpable radial pulses 2+    Radiology:     Plain radiographs not obtained today. Assessment :  Mr. Julianna Moy is a 77 y.o. patient underwent a right anatomic total shoulder arthroplasty on 9/15/2023. Impression:  Encounter Diagnosis   Name Primary?     History of total replacement of right shoulder joint Yes

## 2023-10-05 ENCOUNTER — HOSPITAL ENCOUNTER (OUTPATIENT)
Dept: PHYSICAL THERAPY | Age: 68
Setting detail: THERAPIES SERIES
Discharge: HOME | End: 2023-10-05
Payer: COMMERCIAL

## 2023-10-05 PROCEDURE — 97530 THERAPEUTIC ACTIVITIES: CPT

## 2023-10-05 PROCEDURE — 97110 THERAPEUTIC EXERCISES: CPT

## 2023-10-05 NOTE — FLOWSHEET NOTE
[facility] and Therapy [address] [place] [address], [address], [address] office: [phone] fax: [phone]      Physical Therapy: TREATMENT/PROGRESS NOTE   Patient: Tammy Ojeda (11 y.o. male)   Treatment Date: 10/05/2023   :  1955 MRN: [id number]   Visit #: 2    Insurance: Payor: [employer] / Plan: [employer] [address] NAP CHOICE POS II / Product Type: *No Product type* /   Insurance ID: [id number] - (Commercial)  Secondary Insurance (if applicable):    Treatment Diagnosis:   1. Acute pain of right shoulder  M25.511         2. Weakness  R53.1        Medical Diagnosis:    Presence of right artificial shoulder joint [Z96.611]  S/P Right TSA on 9/15/23   Referring Physician: Chalo Hansen MD  PCP: Ana Almanza MD                             Plan of care signed (Y/N): PENDING    Date of Patient follow up with Physician: 10/2/23     Progress Report/POC: EVAL today    POC update due (10 Rx/or 30 days whichever is less [facility]): 10/27/23     Visit # Insurance Allowable Auth Needed   2 90 VPCY []Yes    [x]No     Latex Allergy:  [x]NO      []YES    Preferred Language for Healthcare:   [x]English       []other:    SUBJECTIVE EXAMINATION     Patient Report/Comments: 3 weeks PO. Doing well. No pain at rest today. Has had some intermittent pain in biceps but not much in shoulder. Saw PA this week and visit went well.      OBJECTIVE EXAMINATION         23   ROM PROM AROM  Comment     L R L R     Flexion   80 table slide         Abduction             ER             IR                           Elbow Flexion             Elbow Extension                23 deferred  Strength L R Comment   Flexion         Abduction         ER         IR         Supraspinatus         Upper Trap         Lower Trap         Mid Trap         Rhomboids                   Biceps         Triceps                   Pronation         Supination         Wrist Flexion         Wrist Extension

## 2023-10-11 ENCOUNTER — HOSPITAL ENCOUNTER (OUTPATIENT)
Dept: PHYSICAL THERAPY | Age: 68
Setting detail: THERAPIES SERIES
Discharge: HOME | End: 2023-10-11
Payer: COMMERCIAL

## 2023-10-11 PROCEDURE — 97530 THERAPEUTIC ACTIVITIES: CPT

## 2023-10-11 PROCEDURE — 97110 THERAPEUTIC EXERCISES: CPT

## 2023-10-11 NOTE — FLOWSHEET NOTE
prevent loss of range of motion, maintain or improve muscular strength or increase flexibility, following either an injury or surgery. (65644) [facility]- Reviewed/Progressed HEP activities related to strengthening, flexibility, endurance, ROM performed to prevent loss of range of motion, maintain or improve muscular strength or increase flexibility, following either an injury or surgery. (14585) NEUROMUSCULAR RE-EDUCATION- Therapeutic procedure, 1 or more areas, each 15 minutes; neuromuscular reeducation of movement, balance, coordination, kinesthetic sense, posture, and/or proprioception for sitting and/or standing activities  (18694)HOME EXERCISE PROGRAM- Reviewed/Progressed HEP activities related to neuromuscular reeducation of movement, balance, coordination, kinesthetic sense, posture, and/or proprioception for sitting and/or standing activities    (47796) THERAPEUTIC ACTIVITY- use of dynamic activities to improve functional performance. (Ex include squatting, ascending/descending stairs, walking, bending, lifting, catching, throwing, pushing, pulling, jumping.)  Direct, one on one contact, billed in 15-minute increments. (69226) MANUAL THERAPY-  Manual therapy techniques, 1 or more regions, each 15 minutes (Mobilization/manipulation, manual lymphatic drainage, manual traction) for the purpose of modulating pain, promoting relaxation,  increasing ROM, reducing/eliminating soft tissue swelling/inflammation/restriction, improving soft tissue extensibility and allowing for proper ROM for normal function with self care, mobility, lifting and ambulation    TREATMENT PLAN   Plan: POC Initiated today- see eval for details    Electronically Signed by Jeremiah Yañez, PT, DPTTRISTAN              Date: 10/11/2023     Note: If patient does not return for scheduled/recommended follow up visits, this note will serve as a discharge from care along with the most recent update on progress.

## 2023-10-13 ENCOUNTER — HOSPITAL ENCOUNTER (OUTPATIENT)
Dept: PHYSICAL THERAPY | Age: 68
Setting detail: THERAPIES SERIES
Discharge: HOME | End: 2023-10-13
Payer: COMMERCIAL

## 2023-10-13 PROCEDURE — 97110 THERAPEUTIC EXERCISES: CPT

## 2023-10-13 PROCEDURE — 97140 MANUAL THERAPY 1/> REGIONS: CPT

## 2023-10-13 PROCEDURE — 97530 THERAPEUTIC ACTIVITIES: CPT

## 2023-10-13 NOTE — FLOWSHEET NOTE
functioning as indicated by patients functional deficits. Status: [] Progressing: [] Met: [] Not Met: [] Adjusted  Pt to improve strength to 4+/5 or better of right shoulder flexion, abduction, IR and ER to allow for proper muscle and joint use in functional mobility, ADLs and prior level of function   Status: [] Progressing: [] Met: [] Not Met: [] Adjusted  Patient will return to Reaching activities without increased symptoms or restriction to work towards return to prior level of function. Status: [] Progressing: [] Met: [] Not Met: [] Adjusted  Patient will increase UE function to allow independence in all [place] self-care activities. Status: [] Progressing: [] Met: [] Not Met: [] Adjusted    Overall Progression Towards Functional goals/ Treatment Progress Update:  [] Patient is progressing as expected towards functional goals listed. [] Progression is slowed due to complexities/Impairments listed. [] Progression has been slowed due to co-morbidities. [x] Plan just implemented, too soon (<30days) to assess goals progression   [] Goals require adjustment due to lack of progress  [] Patient is not progressing as expected and requires additional follow up with physician  [] Other:     CHARGE CAPTURE     CHARGE GRID   CPT Code (TIMED) minutes # CPT Code (UNTIMED) #     [x] Therex (93197)  15 1  [] EVAL:MODERATE (60452 - Typically 30 minutes face-to-face)     [] Neuromusc. Re-ed (88753)    [] Re-Eval (77770)     [x] Manual (04951) 10   [] Estim Unattended (33560)     [x] Ther. Act (51837) 15 1  [] [facility].  Traction (04502)     [] Gait (71301)    [] Dry Needle 1-2 muscle (43719)     [] Aquatic Therex (65340)    [] Dry Needle 3+ muscle (50899)     [] Iontophoresis (25589)    [] VASO (10602)     [] Ultrasound (44615)    [] Group Therapy (46299)     [] Estim Attended (60393)    [x] Other: hot pack  10   Total Timed Code Tx Minutes 40   10     Total Treatment Minutes 50        Charge Justification:  (12067)

## 2023-10-15 ENCOUNTER — TELEPHONE (OUTPATIENT)
Dept: ORTHOPEDIC SURGERY | Age: 68
End: 2023-10-15

## 2023-10-15 RX ORDER — METHOCARBAMOL 750 MG/1
750 TABLET, FILM COATED ORAL 4 TIMES DAILY PRN
Qty: 28 TABLET | Refills: 0 | Status: SHIPPED | OUTPATIENT
Start: 2023-10-15 | End: 2023-10-22

## 2023-10-16 ENCOUNTER — HOSPITAL ENCOUNTER (OUTPATIENT)
Dept: PHYSICAL THERAPY | Age: 68
Setting detail: THERAPIES SERIES
Discharge: HOME | End: 2023-10-16
Payer: COMMERCIAL

## 2023-10-16 PROCEDURE — 97112 NEUROMUSCULAR REEDUCATION: CPT

## 2023-10-16 PROCEDURE — 97140 MANUAL THERAPY 1/> REGIONS: CPT

## 2023-10-16 PROCEDURE — 97110 THERAPEUTIC EXERCISES: CPT

## 2023-10-16 NOTE — FLOWSHEET NOTE
[address] [facility] [employer] and Therapy [address] [place] [address], [address], [address] office: [phone] fax: [phone]      Physical Therapy: TREATMENT/PROGRESS NOTE   Patient: Fred Cordero (80 y.o. male)   Treatment Date: 10/16/2023   :  1955 MRN: [id number]   Visit #: 5    Insurance: Payor: Radha Gonzalez / Plan: Radha Gonzalez NAP CHOICE POS II / Product Type: *No Product type* /   Insurance ID: [id number] - (Commercial)  Secondary Insurance (if applicable):    Treatment Diagnosis:   1. Acute pain of right shoulder  M25.511         2. Weakness  R53.1        Medical Diagnosis:    Presence of right artificial shoulder joint [Z96.611]  S/P Right TSA on 9/15/23   Referring Physician: Sky Esquivel MD  PCP: Artis Cabot, MD                             Plan of care signed (Y/N): Y    Date of Patient follow up with Physician: 10/24/23     Progress Report/POC: 10/27/23    POC update due (10 Rx/or 30 days whichever is less [facility]): 10/27/23     Visit # Insurance Allowable Auth Needed   5 90 VPCY []Yes    [x]No     Latex Allergy:  [x]NO      []YES    Preferred Language for Healthcare:   [x]English       []other:    SUBJECTIVE EXAMINATION     Pain: 2/10    Patient Report/Comments: 4+ weeks PO. Felt better for about a day after last visit. Saturday afternoon his neck locked up again making it difficult to move in any directions. Feels pain down neck and into thoracic spine and even feels \"chest tightness\" this morning. Got muscel relaxer called in from Dr. Jared Jefferson office yesterday.          OUTCOME MEASURE DATE % Deficit   UEFI 23 64% Deficit              OBJECTIVE EXAMINATION         10/16/23   ROM PROM AROM  Comment     L R L R     Flexion   120 table slide         Abduction   110 scaption         ER   50 at side         IR                           Elbow Flexion             Elbow Extension                23 deferred  Strength L R Comment   Flexion         Abduction         ER

## 2023-10-18 ENCOUNTER — HOSPITAL ENCOUNTER (OUTPATIENT)
Dept: PHYSICAL THERAPY | Age: 68
Setting detail: THERAPIES SERIES
Discharge: HOME | End: 2023-10-18
Payer: COMMERCIAL

## 2023-10-18 PROCEDURE — 97112 NEUROMUSCULAR REEDUCATION: CPT

## 2023-10-18 PROCEDURE — 97110 THERAPEUTIC EXERCISES: CPT

## 2023-10-18 PROCEDURE — 97530 THERAPEUTIC ACTIVITIES: CPT

## 2023-10-23 ENCOUNTER — HOSPITAL ENCOUNTER (OUTPATIENT)
Dept: PHYSICAL THERAPY | Age: 68
Setting detail: THERAPIES SERIES
Discharge: HOME | End: 2023-10-23
Payer: COMMERCIAL

## 2023-10-23 PROCEDURE — 97112 NEUROMUSCULAR REEDUCATION: CPT

## 2023-10-23 PROCEDURE — 97110 THERAPEUTIC EXERCISES: CPT

## 2023-10-23 PROCEDURE — 97530 THERAPEUTIC ACTIVITIES: CPT

## 2023-10-23 NOTE — FLOWSHEET NOTE
continued skilled intervention: [x] Yes  [] No      GOALS     Patient stated goal: Return to [place] reaching without pain in right shoulder  Status: [] Progressing: [] Met: [] Not Met: [] Adjusted    Therapist goals for Patient:   Short Term Goals: To be achieved in: 2 weeks  Independent in HEP and progression per patient tolerance, in order to progress toward full function and prevent re-injury. Status: [] Progressing: [] Met: [] Not Met: [] Adjusted  Patient will have a decrease in pain to 0/10 to help  facilitate improvement in movement, function, and ADLs as indicated by functional deficits. Status: [] Progressing: [] Met: [] Not Met: [] Adjusted    Long Term Goals: To be achieved in: 12 weeks  Disability index score of 25% or less for the Upper Extremity Functional Scale to assist with return top prior level of function. Status: [] Progressing: [] Met: [] Not Met: [] Adjusted  Improve shoulder AROM to 140 degrees or  better in right shoulder flexion to allow for proper joint functioning as indicated by patients functional deficits. Status: [] Progressing: [] Met: [] Not Met: [] Adjusted  Pt to improve strength to 4+/5 or better of right shoulder flexion, abduction, IR and ER to allow for proper muscle and joint use in functional mobility, ADLs and prior level of function   Status: [] Progressing: [] Met: [] Not Met: [] Adjusted  Patient will return to Reaching activities without increased symptoms or restriction to work towards return to prior level of function. Status: [] Progressing: [] Met: [] Not Met: [] Adjusted  Patient will increase UE function to allow independence in all [place] self-care activities. Status: [] Progressing: [] Met: [] Not Met: [] Adjusted    Overall Progression Towards Functional goals/ Treatment Progress Update:  [] Patient is progressing as expected towards functional goals listed. [] Progression is slowed due to complexities/Impairments listed.   [] Progression has

## 2023-10-24 ENCOUNTER — OFFICE VISIT (OUTPATIENT)
Dept: ORTHOPEDIC SURGERY | Age: 68
End: 2023-10-24

## 2023-10-24 VITALS — BODY MASS INDEX: 26.22 KG/M2 | HEIGHT: 68 IN | WEIGHT: 173 LBS

## 2023-10-24 DIAGNOSIS — Z96.611 HISTORY OF TOTAL REPLACEMENT OF RIGHT SHOULDER JOINT: Primary | ICD-10-CM

## 2023-10-24 PROCEDURE — 99024 POSTOP FOLLOW-UP VISIT: CPT | Performed by: ORTHOPAEDIC SURGERY

## 2023-10-24 NOTE — PROGRESS NOTES
Chief Complaint    Shoulder Pain (F/U RIGHT SHOULDER)      History of Present Illness:  Xander Olivier is a pleasant, 77 y.o., male, here today for follow up of his right shoulder. This patient is now 6 weeks s/p anatomic total shoulder replacement on 9/15/23. He has continued in physical therapy at the [address] [place] [address]. He feels his recovery is going very well. He is pleased with his recovery thus far. He has no immediate concerns or complaints today. He reports no new injuries or setbacks. Pain Assessment  Location of Pain: Shoulder  Location Modifiers: Right  Severity of Pain: 0  Quality of Pain: Sharp, Dull  Duration of Pain: Persistent  Frequency of Pain: Intermittent  Aggravating Factors:  (postop pain)  Limiting Behavior: Yes  Relieving Factors: Rest, Ice  Work-Related Injury: No  Are there other pain locations you wish to document?: No      Medical History:  Patient's medications, allergies, past medical, surgical, social and family histories were reviewed and updated as appropriate. No notes on file    Review of Systems  A 14 point review of systems was completed by the patient and is available in the media section of the scanned medical record and was reviewed on 10/24/2023. The review is negative with the exception of those things mentioned in the HPI and Past Medical History    Vital Signs: There were no vitals filed for this visit. General/Appearance: Alert and oriented and in no apparent distress. Skin:  There are no skin lesions, cellulitis, or extreme edema. The patient has warm and well-perfused Bilateral upper extremities with brisk capillary refill. Right Shoulder Exam:  Inspection: Incision is healing well. No gross deformities, no signs of infection. Palpation: No tenderness, no crepitus    Active Range of Motion: Forward Elevation 40-50, Internal Rotation is to back pocket    Passive Range of Motion:  Forward Elevation 100 with minimal assistance    Strength: Not

## 2023-10-25 ENCOUNTER — HOSPITAL ENCOUNTER (OUTPATIENT)
Dept: PHYSICAL THERAPY | Age: 68
Setting detail: THERAPIES SERIES
Discharge: HOME | End: 2023-10-25
Payer: COMMERCIAL

## 2023-10-25 PROCEDURE — 97530 THERAPEUTIC ACTIVITIES: CPT

## 2023-10-25 PROCEDURE — 97112 NEUROMUSCULAR REEDUCATION: CPT

## 2023-10-25 PROCEDURE — 97110 THERAPEUTIC EXERCISES: CPT

## 2023-10-25 NOTE — FLOWSHEET NOTE
dynamic activities to improve functional performance. (Ex include squatting, ascending/descending stairs, walking, bending, lifting, catching, throwing, pushing, pulling, jumping.)  Direct, one on one contact, billed in 15-minute increments. (00081) MANUAL THERAPY-  Manual therapy techniques, 1 or more regions, each 15 minutes (Mobilization/manipulation, manual lymphatic drainage, manual traction) for the purpose of modulating pain, promoting relaxation,  increasing ROM, reducing/eliminating soft tissue swelling/inflammation/restriction, improving soft tissue extensibility and allowing for proper ROM for normal function with self care, mobility, lifting and ambulation    TREATMENT PLAN   Plan: POC Initiated today- see eval for details    Electronically Signed by Amos Ornelas PT, DPT, OCS              Date: 10/25/2023     Note: If patient does not return for scheduled/recommended follow up visits, this note will serve as a discharge from care along with the most recent update on progress.

## 2023-10-30 ENCOUNTER — HOSPITAL ENCOUNTER (OUTPATIENT)
Dept: PHYSICAL THERAPY | Age: 68
Setting detail: THERAPIES SERIES
Discharge: HOME | End: 2023-10-30
Payer: COMMERCIAL

## 2023-10-30 PROCEDURE — 97112 NEUROMUSCULAR REEDUCATION: CPT

## 2023-10-30 PROCEDURE — 97110 THERAPEUTIC EXERCISES: CPT

## 2023-10-30 PROCEDURE — 97530 THERAPEUTIC ACTIVITIES: CPT

## 2023-11-02 ENCOUNTER — HOSPITAL ENCOUNTER (OUTPATIENT)
Dept: PHYSICAL THERAPY | Age: 68
Setting detail: THERAPIES SERIES
Discharge: HOME | End: 2023-11-02
Payer: COMMERCIAL

## 2023-11-02 PROCEDURE — 97112 NEUROMUSCULAR REEDUCATION: CPT

## 2023-11-02 PROCEDURE — 97110 THERAPEUTIC EXERCISES: CPT

## 2023-11-02 PROCEDURE — 97530 THERAPEUTIC ACTIVITIES: CPT

## 2023-11-02 NOTE — FLOWSHEET NOTE
Not Met: [] Adjusted    Long Term Goals: To be achieved in: 12 weeks  Disability index score of 25% or less for the Upper Extremity Functional Scale to assist with return top prior level of function. Status: [x] Progressing: [] Met: [] Not Met: [] Adjusted  Improve shoulder AROM to 140 degrees or  better in right shoulder flexion to allow for proper joint functioning as indicated by patients functional deficits. Status: [x] Progressing: [] Met: [] Not Met: [] Adjusted  Pt to improve strength to 4+/5 or better of right shoulder flexion, abduction, IR and ER to allow for proper muscle and joint use in functional mobility, ADLs and prior level of function   Status: [x] Progressing: [] Met: [] Not Met: [] Adjusted  Patient will return to Reaching activities without increased symptoms or restriction to work towards return to prior level of function. Status: [x] Progressing: [] Met: [] Not Met: [] Adjusted  Patient will increase UE function to allow independence in all [place] self-care activities. Status: [x] Progressing: [] Met: [] Not Met: [] Adjusted    Overall Progression Towards Functional goals/ Treatment Progress Update:  [x] Patient is progressing as expected towards functional goals listed. [] Progression is slowed due to complexities/Impairments listed. [] Progression has been slowed due to co-morbidities. [] Plan just implemented, too soon (<30days) to assess goals progression   [] Goals require adjustment due to lack of progress  [] Patient is not progressing as expected and requires additional follow up with physician  [] Other:     CHARGE CAPTURE     CHARGE GRID   CPT Code (TIMED) # CPT Code (UNTIMED) #     [x] Therex (35729)  1  [] EVAL:MODERATE (47215 - Typically 30 minutes face-to-face)     [x] Neuromusc. Re-ed (92895) 1  [] Re-Eval (43777)     [] Manual (61035)   [] Estim Unattended (70952)     [x] Ther. Act (36993) 1  [] [facility]  Traction (90624)     [] Gait (15295)   [] Dry Needle 1-2

## 2023-11-06 ENCOUNTER — HOSPITAL ENCOUNTER (OUTPATIENT)
Dept: PHYSICAL THERAPY | Age: 68
Setting detail: THERAPIES SERIES
Discharge: HOME | End: 2023-11-06
Payer: COMMERCIAL

## 2023-11-06 PROCEDURE — 97110 THERAPEUTIC EXERCISES: CPT

## 2023-11-06 PROCEDURE — 97112 NEUROMUSCULAR REEDUCATION: CPT

## 2023-11-06 PROCEDURE — 97530 THERAPEUTIC ACTIVITIES: CPT

## 2023-11-06 NOTE — FLOWSHEET NOTE
(Mobilization/manipulation, manual lymphatic drainage, manual traction) for the purpose of modulating pain, promoting relaxation,  increasing ROM, reducing/eliminating soft tissue swelling/inflammation/restriction, improving soft tissue extensibility and allowing for proper ROM for normal function with self care, mobility, lifting and ambulation    TREATMENT PLAN   Plan: 2x/week for 8 weeks 10/30/23    Electronically Signed by Zulay Rose PT, DPT, TRISTAN              Date: 11/06/2023     Note: If patient does not return for scheduled/recommended follow up visits, this note will serve as a discharge from care along with the most recent update on progress.

## 2023-11-09 ENCOUNTER — HOSPITAL ENCOUNTER (OUTPATIENT)
Dept: PHYSICAL THERAPY | Age: 68
Setting detail: THERAPIES SERIES
Discharge: HOME | End: 2023-11-09
Payer: COMMERCIAL

## 2023-11-09 PROCEDURE — 97110 THERAPEUTIC EXERCISES: CPT

## 2023-11-09 PROCEDURE — 97112 NEUROMUSCULAR REEDUCATION: CPT

## 2023-11-09 PROCEDURE — 97530 THERAPEUTIC ACTIVITIES: CPT

## 2023-11-09 NOTE — FLOWSHEET NOTE
[facility] and Therapy [address] [place] [address], [address], [address] office: [phone] fax: [phone]        Physical Therapy: TREATMENT/PROGRESS NOTE   Patient: Stanislaw Phan (59 y.o. male)   Treatment Date: 2023   :  1955 MRN: [id number]   Visit #: 12    Insurance: Payor: Skylar Cifuentes / Plan: Skylar Cifuentes NAP CHOICE POS II / Product Type: *No Product type* /   Insurance ID: [id number] - (Commercial)  Secondary Insurance (if applicable):    Treatment Diagnosis:   1. Acute pain of right shoulder  M25.511         2. Weakness  R53.1        Medical Diagnosis:    Presence of right artificial shoulder joint [Z96.611]  S/P Right TSA with [facility] on 9/15/23   Referring Physician: Jax Mcqueen MD  PCP: Era Villela MD                             Plan of care signed (Y/N): Y    Date of Patient follow up with Physician: 23     Progress Report/POC: 23    POC update due (10 Rx/or 30 days whichever is less [facility]): 23     Visit # Insurance Allowable Auth Needed   12 90 VPCY []Yes    [x]No     Latex Allergy:  [x]NO      []YES    Preferred Language for Healthcare:   [x]English       []other:    SUBJECTIVE EXAMINATION     Pain: 0/10    Patient Report/Comments: 7+ weeks PO. Doing well. Continues to have mild soreness in shoulder but nothing significant. No pain after last visit.        OUTCOME MEASURE DATE % Deficit   UEFI 23 64% Deficit   UEFI 10/30/23 44% deficit         OBJECTIVE EXAMINATION         23   ROM PROM AROM  Comment     L R L R     Flexion   [address]   110     Abduction   120   90     ER   70 at 45 ABD         IR                           Elbow Flexion             Elbow Extension                10/30/23 deferred  Strength L R Comment   Flexion         Abduction         ER         IR         Supraspinatus         Upper Trap         Lower Trap         Mid Trap         Rhomboids                   Biceps         Triceps

## 2023-11-15 ENCOUNTER — HOSPITAL ENCOUNTER (OUTPATIENT)
Dept: PHYSICAL THERAPY | Age: 68
Setting detail: THERAPIES SERIES
Discharge: HOME | End: 2023-11-15
Payer: COMMERCIAL

## 2023-11-15 PROCEDURE — 97110 THERAPEUTIC EXERCISES: CPT

## 2023-11-15 PROCEDURE — 97112 NEUROMUSCULAR REEDUCATION: CPT

## 2023-11-15 PROCEDURE — 97530 THERAPEUTIC ACTIVITIES: CPT

## 2023-11-15 NOTE — FLOWSHEET NOTE
Manual (45482)   [] Estim Unattended (43034)     [x] Ther. Act (22634) 1  [] [facility] Traction (61919)     [] Gait (28396)   [] Dry Needle 1-2 muscle (83587)     [] Aquatic Therex (25890)   [] Dry Needle 3+ muscle (99002)     [] Iontophoresis (26966)   [] VASO (45658)     [] Ultrasound (00278)   [] Group Therapy (02613)     [] Estim Attended (10165)   [x] Other: cold  pack 15'    Total Timed Code Tx Minutes 50  15     Total Treatment Minutes 65        Charge Justification:  (75194) THERAPEUTIC EXERCISE - Provided verbal/tactile cueing for activities related to strengthening, flexibility, endurance, ROM performed to prevent loss of range of motion, maintain or improve muscular strength or increase flexibility, following either an injury or surgery. (44708) [address] [facility]- Reviewed/Progressed HEP activities related to strengthening, flexibility, endurance, ROM performed to prevent loss of range of motion, maintain or improve muscular strength or increase flexibility, following either an injury or surgery. (94644) NEUROMUSCULAR RE-EDUCATION- Therapeutic procedure, 1 or more areas, each 15 minutes; neuromuscular reeducation of movement, balance, coordination, kinesthetic sense, posture, and/or proprioception for sitting and/or standing activities  (76354)HOME EXERCISE PROGRAM- Reviewed/Progressed HEP activities related to neuromuscular reeducation of movement, balance, coordination, kinesthetic sense, posture, and/or proprioception for sitting and/or standing activities    (24358) THERAPEUTIC ACTIVITY- use of dynamic activities to improve functional performance. (Ex include squatting, ascending/descending stairs, walking, bending, lifting, catching, throwing, pushing, pulling, jumping.)  Direct, one on one contact, billed in 15-minute increments.   (36406) MANUAL THERAPY-  Manual therapy techniques, 1 or more regions, each 15 minutes (Mobilization/manipulation, manual lymphatic drainage, manual traction) for the

## 2023-11-17 ENCOUNTER — HOSPITAL ENCOUNTER (OUTPATIENT)
Dept: PHYSICAL THERAPY | Age: 68
Setting detail: THERAPIES SERIES
Discharge: HOME | End: 2023-11-17
Payer: COMMERCIAL

## 2023-11-17 PROCEDURE — 97110 THERAPEUTIC EXERCISES: CPT

## 2023-11-17 PROCEDURE — 97530 THERAPEUTIC ACTIVITIES: CPT

## 2023-11-17 PROCEDURE — 97112 NEUROMUSCULAR REEDUCATION: CPT

## 2023-11-17 NOTE — FLOWSHEET NOTE
(57573)     [] Gait (94489)   [] Dry Needle 1-2 muscle (16944)     [] Aquatic Therex (45030)   [] Dry Needle 3+ muscle (89707)     [] Iontophoresis (55237)   [] VASO (45767)     [] Ultrasound (15069)   [] Group Therapy (45637)     [] Estim Attended (16079)   [x] Other: cold  pack 15'    Total Timed Code Tx Minutes 50  15     Total Treatment Minutes 65        Charge Justification:  (46552) THERAPEUTIC EXERCISE - Provided verbal/tactile cueing for activities related to strengthening, flexibility, endurance, ROM performed to prevent loss of range of motion, maintain or improve muscular strength or increase flexibility, following either an injury or surgery. (27677) [address] [place] [address]- Reviewed/Progressed HEP activities related to strengthening, flexibility, endurance, ROM performed to prevent loss of range of motion, maintain or improve muscular strength or increase flexibility, following either an injury or surgery. (36557) NEUROMUSCULAR RE-EDUCATION- Therapeutic procedure, 1 or more areas, each 15 minutes; neuromuscular reeducation of movement, balance, coordination, kinesthetic sense, posture, and/or proprioception for sitting and/or standing activities  (27943)HOME EXERCISE PROGRAM- Reviewed/Progressed HEP activities related to neuromuscular reeducation of movement, balance, coordination, kinesthetic sense, posture, and/or proprioception for sitting and/or standing activities    (35943) THERAPEUTIC ACTIVITY- use of dynamic activities to improve functional performance. (Ex include squatting, ascending/descending stairs, walking, bending, lifting, catching, throwing, pushing, pulling, jumping.)  Direct, one on one contact, billed in 15-minute increments.   (01261) MANUAL THERAPY-  Manual therapy techniques, 1 or more regions, each 15 minutes (Mobilization/manipulation, manual lymphatic drainage, manual traction) for the purpose of modulating pain, promoting relaxation,  increasing ROM, reducing/eliminating soft

## 2023-11-20 ENCOUNTER — HOSPITAL ENCOUNTER (OUTPATIENT)
Dept: PHYSICAL THERAPY | Age: 68
Setting detail: THERAPIES SERIES
Discharge: HOME | End: 2023-11-20
Payer: COMMERCIAL

## 2023-11-20 PROCEDURE — 97112 NEUROMUSCULAR REEDUCATION: CPT

## 2023-11-20 PROCEDURE — 97110 THERAPEUTIC EXERCISES: CPT

## 2023-11-20 PROCEDURE — 97530 THERAPEUTIC ACTIVITIES: CPT

## 2023-11-20 NOTE — FLOWSHEET NOTE
decrease in pain to 0/10 to help  facilitate improvement in movement, function, and ADLs as indicated by functional deficits. Status: [] Progressing: [x] Met: [] Not Met: [] Adjusted    Long Term Goals: To be achieved in: 12 weeks  Disability index score of 25% or less for the Upper Extremity Functional Scale to assist with return top prior level of function. Status: [x] Progressing: [] Met: [] Not Met: [] Adjusted  Improve shoulder AROM to 140 degrees or  better in right shoulder flexion to allow for proper joint functioning as indicated by patients functional deficits. Status: [x] Progressing: [] Met: [] Not Met: [] Adjusted  Pt to improve strength to 4+/5 or better of right shoulder flexion, abduction, IR and ER to allow for proper muscle and joint use in functional mobility, ADLs and prior level of function   Status: [x] Progressing: [] Met: [] Not Met: [] Adjusted  Patient will return to Reaching activities without increased symptoms or restriction to work towards return to prior level of function. Status: [x] Progressing: [] Met: [] Not Met: [] Adjusted  Patient will increase UE function to allow independence in all [place] self-care activities. Status: [x] Progressing: [] Met: [] Not Met: [] Adjusted    Overall Progression Towards Functional goals/ Treatment Progress Update:  [x] Patient is progressing as expected towards functional goals listed. [] Progression is slowed due to complexities/Impairments listed. [] Progression has been slowed due to co-morbidities. [] Plan just implemented, too soon (<30days) to assess goals progression   [] Goals require adjustment due to lack of progress  [] Patient is not progressing as expected and requires additional follow up with physician  [] Other:     CHARGE CAPTURE     CHARGE GRID   CPT Code (TIMED) # CPT Code (UNTIMED) #     [x] Therex (47033)  1  [] EVAL:MODERATE (41007 - Typically 30 minutes face-to-face)     [x] Neuromusc.  Re-ed (47358) 1

## 2023-11-22 ENCOUNTER — OFFICE VISIT (OUTPATIENT)
Dept: ORTHOPEDIC SURGERY | Age: 68
End: 2023-11-22

## 2023-11-22 VITALS — BODY MASS INDEX: 26.22 KG/M2 | WEIGHT: 173 LBS | HEIGHT: 68 IN

## 2023-11-22 DIAGNOSIS — Z96.611 STATUS POST TOTAL REPLACEMENT OF RIGHT SHOULDER: Primary | ICD-10-CM

## 2023-11-22 PROCEDURE — 99024 POSTOP FOLLOW-UP VISIT: CPT | Performed by: ORTHOPAEDIC SURGERY

## 2023-11-22 NOTE — PROGRESS NOTES
needed. He was in agreement with this plan and all questions were answered to his satisfaction. He was encouraged to call with any questions. 11/22/2023  11:14 AM      Sergio Patel PA-C  Orthopaedic Sports Medicine Physician Assistant    During this examination, I, Sergio Patel PA-C, functioned as a scribe for Dr. Sky Esquivel. This dictation was performed with a verbal recognition program (DRAGON) and it was checked for errors. It is possible that there are still dictated errors within this office note. If so, please bring any errors to my attention for an addendum. All efforts were made to ensure that this office note is accurate.  ____________  I, Dr. Sky Esquivel, personally performed the services described in this documentation as described by Sergio Patel PA-C in my presence, and it is both accurate and complete. Merritt Vargas MD, PhD  11/22/2023

## 2023-11-27 ENCOUNTER — APPOINTMENT (OUTPATIENT)
Dept: PHYSICAL THERAPY | Age: 68
End: 2023-11-27
Payer: COMMERCIAL

## 2023-11-27 ENCOUNTER — HOSPITAL ENCOUNTER (OUTPATIENT)
Dept: PHYSICAL THERAPY | Age: 68
Setting detail: THERAPIES SERIES
Discharge: HOME | End: 2023-11-27
Payer: COMMERCIAL

## 2023-11-27 PROCEDURE — 97110 THERAPEUTIC EXERCISES: CPT

## 2023-11-27 PROCEDURE — 97530 THERAPEUTIC ACTIVITIES: CPT

## 2023-11-27 PROCEDURE — 97112 NEUROMUSCULAR REEDUCATION: CPT

## 2023-11-27 NOTE — FLOWSHEET NOTE
mobility, lifting and ambulation    TREATMENT PLAN   Plan: 2x/week for 8 weeks 10/30/23    Electronically Signed by Malgorzata Juarez PT, DPT, OCS              Date: 11/27/2023     Note: If patient does not return for scheduled/recommended follow up visits, this note will serve as a discharge from care along with the most recent update on progress.

## 2023-11-29 ENCOUNTER — APPOINTMENT (OUTPATIENT)
Dept: PHYSICAL THERAPY | Age: 68
End: 2023-11-29
Payer: COMMERCIAL

## 2023-11-30 ENCOUNTER — HOSPITAL ENCOUNTER (OUTPATIENT)
Dept: PHYSICAL THERAPY | Age: 68
Setting detail: THERAPIES SERIES
Discharge: HOME | End: 2023-11-30
Payer: COMMERCIAL

## 2023-11-30 PROCEDURE — 97110 THERAPEUTIC EXERCISES: CPT

## 2023-11-30 PROCEDURE — 97530 THERAPEUTIC ACTIVITIES: CPT

## 2023-11-30 PROCEDURE — 97112 NEUROMUSCULAR REEDUCATION: CPT

## 2023-11-30 NOTE — FLOWSHEET NOTE
[facility] [address] [place] [address], [address], [address] office: [phone] fax: [phone]        Physical Therapy: TREATMENT/PROGRESS NOTE   Patient: Steve Michael (88 y.o. male)   Treatment Date: 2023   :  1955 MRN: [id number]   Visit #: 17    Insurance: Payor: Allison Small / Plan: Allison Small NAP CHOICE POS II / Product Type: *No Product type* /   Insurance ID: [id number] - (Commercial)  Secondary Insurance (if applicable):    Treatment Diagnosis:   1. Acute pain of right shoulder  M25.511         2. Weakness  R53.1        Medical Diagnosis:    Presence of right artificial shoulder joint [Z96.611]  S/P Right TSA with OBT on 9/15/23   Referring Physician: Vj Farmer MD  PCP: Lianne Purdy MD                             Plan of care signed (Y/N): Y    Date of Patient follow up with Physician: 23     Progress Report/POC: 23    POC update due (10 Rx/or 30 days whichever is less [facility]): 23     Visit # Insurance Allowable Auth Needed   17 90 VPCY []Yes    [x]No       OUTCOME MEASURE DATE % Deficit   UEFI 23 64% Deficit   UEFI 10/30/23 44% deficit       Latex Allergy:  [x]NO      []YES  Preferred Language for Healthcare:   [x]English       []other:    SUBJECTIVE EXAMINATION     Pain: 0/10    Patient Report/Comments: 11 weeks PO. Doing well. A bit sore this morning after using left arm to help neighbor up off ground yesterday.          OBJECTIVE EXAMINATION         11/15/23   ROM PROM AROM  Comment     L R L R     Flexion   165 OH Cane 160 135     Abduction    150 135     ER   90 @ 90/90         IR   25 GH                       Elbow Flexion             Elbow Extension                10/30/23 deferred  Strength L R Comment   Flexion         Abduction         ER         IR         Supraspinatus         Upper Trap         Lower Trap         Mid Trap         Rhomboids                   Biceps         Triceps

## 2023-12-04 ENCOUNTER — HOSPITAL ENCOUNTER (OUTPATIENT)
Dept: PHYSICAL THERAPY | Age: 68
Setting detail: THERAPIES SERIES
Discharge: HOME | End: 2023-12-04
Payer: COMMERCIAL

## 2023-12-04 PROCEDURE — 97530 THERAPEUTIC ACTIVITIES: CPT

## 2023-12-04 PROCEDURE — 97112 NEUROMUSCULAR REEDUCATION: CPT

## 2023-12-04 PROCEDURE — 97110 THERAPEUTIC EXERCISES: CPT

## 2023-12-07 ENCOUNTER — HOSPITAL ENCOUNTER (OUTPATIENT)
Dept: PHYSICAL THERAPY | Age: 68
Setting detail: THERAPIES SERIES
Discharge: HOME | End: 2023-12-07
Payer: COMMERCIAL

## 2023-12-07 PROCEDURE — 97530 THERAPEUTIC ACTIVITIES: CPT

## 2023-12-07 PROCEDURE — 97110 THERAPEUTIC EXERCISES: CPT

## 2023-12-07 PROCEDURE — 97112 NEUROMUSCULAR REEDUCATION: CPT

## 2023-12-11 ENCOUNTER — HOSPITAL ENCOUNTER (OUTPATIENT)
Dept: PHYSICAL THERAPY | Age: 68
Setting detail: THERAPIES SERIES
Discharge: HOME | End: 2023-12-11
Payer: COMMERCIAL

## 2023-12-11 PROCEDURE — 97110 THERAPEUTIC EXERCISES: CPT

## 2023-12-11 PROCEDURE — 97112 NEUROMUSCULAR REEDUCATION: CPT

## 2023-12-11 PROCEDURE — 97530 THERAPEUTIC ACTIVITIES: CPT

## 2023-12-13 ENCOUNTER — HOSPITAL ENCOUNTER (OUTPATIENT)
Dept: PHYSICAL THERAPY | Age: 68
Setting detail: THERAPIES SERIES
Discharge: HOME | End: 2023-12-13
Payer: COMMERCIAL

## 2023-12-13 PROCEDURE — 97112 NEUROMUSCULAR REEDUCATION: CPT

## 2023-12-13 PROCEDURE — 97530 THERAPEUTIC ACTIVITIES: CPT

## 2023-12-13 PROCEDURE — 97110 THERAPEUTIC EXERCISES: CPT

## 2023-12-13 NOTE — FLOWSHEET NOTE
functioning as indicated by patients functional deficits. Status: [] Progressing: [x] Met: [] Not Met: [] Adjusted  Pt to improve strength to 4+/5 or better of right shoulder flexion, abduction, IR and ER to allow for proper muscle and joint use in functional mobility, ADLs and prior level of function   Status: [] Progressing: [x] Met: [] Not Met: [] Adjusted  Patient will return to Reaching activities without increased symptoms or restriction to work towards return to prior level of function. Status: [] Progressing: [x] Met: [] Not Met: [] Adjusted  Patient will increase UE function to allow independence in all [place] self-care activities. Status: [] Progressing: [x] Met: [] Not Met: [] Adjusted    Overall Progression Towards Functional goals/ Treatment Progress Update:  [x] Patient is progressing as expected towards functional goals listed. [] Progression is slowed due to complexities/Impairments listed. [] Progression has been slowed due to co-morbidities. [] Plan just implemented, too soon (<30days) to assess goals progression   [] Goals require adjustment due to lack of progress  [] Patient is not progressing as expected and requires additional follow up with physician  [] Other:     CHARGE CAPTURE     CHARGE GRID   CPT Code (TIMED) # CPT Code (UNTIMED) #     [x] Therex (24917)  1  [] EVAL:MODERATE (35963 - Typically 30 minutes face-to-face)     [x] Neuromusc. Re-ed (20042) 1  [] Re-Eval (61320)     [] Manual (54012)   [] Estim Unattended (51520)     [x] Ther. Act (42746) 1  [] [facility].  Traction (85265)     [] Gait (59134)   [] Dry Needle 1-2 muscle (29943)     [] Aquatic Therex (72103)   [] Dry Needle 3+ muscle (32670)     [] Iontophoresis (82568)   [] VASO (28567)     [] Ultrasound (69410)   [] Group Therapy (77408)     [] Estim Attended (88840)   [x] Other: cold  pack 15'    Total Timed Code Tx Minutes 48'  15'     Total Treatment Minutes 76'        Charge Justification:  (45683) THERAPEUTIC EXERCISE

## 2023-12-27 ENCOUNTER — OFFICE VISIT (OUTPATIENT)
Dept: ORTHOPEDIC SURGERY | Age: 68
End: 2023-12-27
Payer: COMMERCIAL

## 2023-12-27 VITALS — WEIGHT: 173 LBS | HEIGHT: 68 IN | BODY MASS INDEX: 26.22 KG/M2

## 2023-12-27 DIAGNOSIS — Z96.611 STATUS POST TOTAL REPLACEMENT OF RIGHT SHOULDER: Primary | ICD-10-CM

## 2023-12-27 PROCEDURE — 99213 OFFICE O/P EST LOW 20 MIN: CPT | Performed by: ORTHOPAEDIC SURGERY

## 2023-12-27 PROCEDURE — 1123F ACP DISCUSS/DSCN MKR DOCD: CPT | Performed by: ORTHOPAEDIC SURGERY

## 2023-12-27 NOTE — PROGRESS NOTES
Chief Complaint    Shoulder Pain (F/U RIGHT SHOULDER)      History of Present Illness:  Alexy Mcgraw is a pleasant, 77 y.o., male, here today for follow up of right shoulder. This patient. This patient underwent an anatomic total shoulder arthroplasty on September 15, 2023. He is very pleased with his recovery thus far He has continued in physical therapy at the [facility]. He has no immediate concerns or complaints today. He reports no new injuries or setbacks. Pain Assessment  Location of Pain: Shoulder  Location Modifiers: Right  Relieving Factors: Rest  Work-Related Injury: No  Are there other pain locations you wish to document?: No      Medical History:  Patient's medications, allergies, past medical, surgical, social and family histories were reviewed and updated as appropriate. No notes on file    Review of Systems  A 14 point review of systems was completed by the patient and is available in the media section of the scanned medical record and was reviewed on 12/27/2023. The review is negative with the exception of those things mentioned in the HPI and Past Medical History    Vital Signs: There were no vitals filed for this visit. General/Appearance: Alert and oriented and in no apparent distress. Skin:  There are no skin lesions, cellulitis, or extreme edema. The patient has warm and well-perfused Bilateral upper extremities with brisk capillary refill. Right Shoulder Exam:  Inspection: Incision is fully healed. No gross deformities, no signs of infection. Palpation: No crepitus    Active Range of Motion: Forward Elevation 150, Abduction 150, Internal Rotation sacrum at midline    Passive Range of Motion: Deferred    Strength:  External Rotation 4/5 vs 4+/5 on the left, Internal Rotation 4/5    Special Tests:  No Richard muscle deformity.     Neurovascular: Sensation to light touch is intact, no motor deficits, palpable radial pulses 2+      Radiology:     No

## 2023-12-28 ENCOUNTER — HOSPITAL ENCOUNTER (OUTPATIENT)
Dept: PHYSICAL THERAPY | Age: 68
Setting detail: THERAPIES SERIES
Discharge: HOME | End: 2023-12-28
Payer: COMMERCIAL

## 2023-12-28 PROCEDURE — 97112 NEUROMUSCULAR REEDUCATION: CPT

## 2023-12-28 PROCEDURE — 97110 THERAPEUTIC EXERCISES: CPT

## 2023-12-28 PROCEDURE — 97140 MANUAL THERAPY 1/> REGIONS: CPT

## 2023-12-28 NOTE — FLOWSHEET NOTE
soft tissue extensibility and allowing for proper ROM for normal function with self care, mobility, lifting and ambulation    TREATMENT PLAN   Plan: 2x/week for 4 weeks 12/4/23    Electronically Signed by Ira Guevara PT, DPT, OCS              Date: 12/28/2023     Note: If patient does not return for scheduled/recommended follow up visits, this note will serve as a discharge from care along with the most recent update on progress.

## 2024-01-05 ENCOUNTER — HOSPITAL ENCOUNTER (OUTPATIENT)
Dept: PHYSICAL THERAPY | Age: 69
Setting detail: THERAPIES SERIES
Discharge: HOME | End: 2024-01-05
Payer: COMMERCIAL

## 2024-01-05 PROCEDURE — 97140 MANUAL THERAPY 1/> REGIONS: CPT

## 2024-01-05 PROCEDURE — 97110 THERAPEUTIC EXERCISES: CPT

## 2024-01-05 PROCEDURE — 97112 NEUROMUSCULAR REEDUCATION: CPT

## 2024-01-05 NOTE — PLAN OF CARE
[facility]- Outpatient Rehabilitation and Therapy [address], [place] [address] office: [phone] fax: [phone]       Physical Therapy Re-Certification Plan of Care    Dear  Dr. Medina,    We had the pleasure of treating the following patient for physical therapy services at [facility].  A summary of our findings can be found in the updated assessment below.  This includes our plan of care.  If you have any questions or concerns regarding these findings, please do not hesitate to contact me at [phone].  Thank you for the referral.     Physician Signature:________________________________Date:__________________  By signing above (or electronic signature), therapist’s plan is approved by physician      Overall Response to Treatment:   [x]Patient is responding well to treatment and improvement is noted with regards  to goals   []Patient should continue to improve in reasonable time if they continue HEP   []Patient has plateaued and is no longer responding to skilled PT intervention    []Patient is getting worse and would benefit from return to referring MD   []Patient unable to adhere to initial POC   [x]Other: Doing very well. Strength in shoulder WFL. He has no pain at rest or ADLs. Still lacks some functional IR but much improved this month with focused stretching on his HEP. Has been very compliant and motivated to date. Recommend 1x/week for additional 3-4 weeks before transition out of therapy to fully independent HEP. He is agreeable to this plan.     Date range of Visits: 23-24  Total Visits: 25      Physical Therapy: TREATMENT/PROGRESS NOTE   Patient: Kane Jj (68 y.o. male)   Treatment Date: 2024   :  1955 MRN: [id number]   Visit #: 1 (), 24 ()   Insurance: Payor: AETNA / Plan: AETNA NAP CHOICE POS II / Product Type: *No Product type* /   Insurance ID: [id number] - (Commercial)  Secondary Insurance (if

## 2024-01-08 ENCOUNTER — HOSPITAL ENCOUNTER (OUTPATIENT)
Dept: PHYSICAL THERAPY | Age: 69
Setting detail: THERAPIES SERIES
Discharge: HOME | End: 2024-01-08
Payer: COMMERCIAL

## 2024-01-08 PROCEDURE — 97110 THERAPEUTIC EXERCISES: CPT

## 2024-01-08 PROCEDURE — 97112 NEUROMUSCULAR REEDUCATION: CPT

## 2024-01-08 PROCEDURE — 97530 THERAPEUTIC ACTIVITIES: CPT

## 2024-01-08 PROCEDURE — 97140 MANUAL THERAPY 1/> REGIONS: CPT

## 2024-01-08 NOTE — FLOWSHEET NOTE
(93739)  2  [] EVAL:MODERATE (19600 - Typically 30 minutes face-to-face)     [x] Neuromusc. Re-ed (31646) 1  [] Re-Eval (01789)     [x] Manual (16255) 1  [] Estim Unattended (57259)     [] Ther. Act (54212)   [] Mech. Traction (28469)     [] Gait (82767)   [] Dry Needle 1-2 muscle (20560)     [] Aquatic Therex (59563)   [] Dry Needle 3+ muscle (20561)     [] Iontophoresis (60813)   [] VASO (97864)     [] Ultrasound (18003)   [] Group Therapy (75057)     [] Estim Attended (47550)   [] Other: cold  pack 15'    Total Timed Code Tx Minutes 55'       Total Treatment Minutes 55'        Charge Justification:  (65692) THERAPEUTIC EXERCISE - Provided verbal/tactile cueing for activities related to strengthening, flexibility, endurance, ROM performed to prevent loss of range of motion, maintain or improve muscular strength or increase flexibility, following either an injury or surgery.   (85691) HOME EXERCISE PROGRAM- Reviewed/Progressed HEP activities related to strengthening, flexibility, endurance, ROM performed to prevent loss of range of motion, maintain or improve muscular strength or increase flexibility, following either an injury or surgery.  (23437) NEUROMUSCULAR RE-EDUCATION- Therapeutic procedure, 1 or more areas, each 15 minutes; neuromuscular reeducation of movement, balance, coordination, kinesthetic sense, posture, and/or proprioception for sitting and/or standing activities  (33664)HOME EXERCISE PROGRAM- Reviewed/Progressed HEP activities related to neuromuscular reeducation of movement, balance, coordination, kinesthetic sense, posture, and/or proprioception for sitting and/or standing activities    (56145) THERAPEUTIC ACTIVITY- use of dynamic activities to improve functional performance. (Ex include squatting, ascending/descending stairs, walking, bending, lifting, catching, throwing, pushing, pulling, jumping.)  Direct, one on one contact, billed in 15-minute increments.  (11250) MANUAL THERAPY-  Manual

## 2024-01-15 ENCOUNTER — HOSPITAL ENCOUNTER (OUTPATIENT)
Dept: PHYSICAL THERAPY | Age: 69
Setting detail: THERAPIES SERIES
Discharge: HOME | End: 2024-01-15
Payer: COMMERCIAL

## 2024-01-15 PROCEDURE — 97530 THERAPEUTIC ACTIVITIES: CPT

## 2024-01-15 PROCEDURE — 97110 THERAPEUTIC EXERCISES: CPT

## 2024-01-15 PROCEDURE — 97140 MANUAL THERAPY 1/> REGIONS: CPT

## 2024-01-15 PROCEDURE — 97112 NEUROMUSCULAR REEDUCATION: CPT

## 2024-01-15 NOTE — FLOWSHEET NOTE
5     Supraspinatus         Upper Trap         Lower Trap         Mid Trap         Rhomboids                   Biceps    5     Triceps                      12/4/23  Special Tests Results/Comment   IRRST     Dior-Frank     Neers     Painful Arc  Neg   Drop Arm  Neg   ER Lag Sign  Neg   Empty Can / Mabel's  NT   Champagne [place]  NT   Speeds     O’Briens     Cross body adduction     Apprehension/Relocation     Hornblower's     Bicep Load II                   Joint mobility:               [x]Normal - 12/4/23              []Hypo              []Hyper     Palpation: no TTP 1/5/24     Bandages/Dressings/Incisions: healed well 12/4/23         RESTRICTIONS/PRECAUTIONS: S/P Right TSA with OBT on 9/15/23  Exercises/Interventions:   Exercise/Equipment Resistance/Repetitions Other comments   Stretching/PROM           Wall Slides 10x10\" Flexion  10x10\" Scaption                Cross Body Stretch 3x30\" In side-lying   Sleeper Stretch 3x30\"    BBIR 10x10\" R Shoulder blocked at wall   Cane Extension 3x30\" R shoulder blocked at wall   Pec Stretch 3x30\" doorway Single arm                                     Isometrics     Retraction                   Internal Rotation          PRE's     Flexion 3x10; 0# Standing, 0-90   Scaption/Full Can 3x10; 0# Standing, 0-90   External Rotation 3x10; 4#    Internal Rotation     Shrugs 3x10; 10# Prone I 3x10; 0# Head down on L Forearm - EOB - foot stool   Prone T 3x10; 0# Head down on L Forearm - EOB - foot stool   Prone Y     Prone Row + ER     Prow Row + ER + OH Press     Biceps 3x10; 10#    Triceps     Bent Over Row 3x10; 10#                     Cable Column/Theraband        Internal Rotation 3x10; Black  10x10\" Silver             BIC        PNF          Dynamic Stability     BoW 3x30ea  Circles (cw, ccw) At 90 deg flex   2# ball   Body Blade 3x20\"  3x10\" IR/ER at side  Two Hands, Flexion at 90 deg   SB Push Ups 10x5\"    Wall Clock Taps               Manual interventions     PROM 3' IR   GH

## 2024-01-22 ENCOUNTER — HOSPITAL ENCOUNTER (OUTPATIENT)
Dept: PHYSICAL THERAPY | Age: 69
Setting detail: THERAPIES SERIES
Discharge: HOME | End: 2024-01-22
Payer: COMMERCIAL

## 2024-01-22 PROCEDURE — 97530 THERAPEUTIC ACTIVITIES: CPT

## 2024-01-22 PROCEDURE — 97110 THERAPEUTIC EXERCISES: CPT

## 2024-01-22 PROCEDURE — 97112 NEUROMUSCULAR REEDUCATION: CPT

## 2024-01-22 PROCEDURE — 97140 MANUAL THERAPY 1/> REGIONS: CPT

## 2024-01-22 NOTE — FLOWSHEET NOTE
[facility]- Outpatient Rehabilitation and Therapy [address], [place] [address] office: [phone] fax: [phone]           Physical Therapy: TREATMENT/PROGRESS NOTE   Patient: Kane Jj (68 y.o. male)   Treatment Date: 2024   :  1955 MRN: [id number]   Visit #:  (), 24 ()   Insurance: Payor: AETNA / Plan: AETNA NAP CHOICE POS II / Product Type: *No Product type* /   Insurance ID: [id number] - (Commercial)  Secondary Insurance (if applicable):    Treatment Diagnosis:   1. Acute pain of right shoulder  M25.511         2. Weakness  R53.1        Medical Diagnosis:    Presence of right artificial shoulder joint [Z96.611]  S/P Right TSA with OBT on 9/15/23   Referring Physician: Merritt Medina MD  PCP: Aditya Galo MD                             Plan of care signed (Y/N): Y    Date of Patient follow up with Physician: 24     Progress Report/POC: 24    POC update due (10 Rx/or 30 days whichever is less OR AUTH LIMITS): 24     Visit # Insurance Allowable Auth Needed    ()  24 () 90 VPCY []Yes    [x]No       OUTCOME MEASURE DATE % Deficit   UEFI 23 64% Deficit   UEFI 10/30/23 44% deficit   UEFI 23 20% Deficit   UEFI 24 9% Deficit       Latex Allergy:  [x]NO      []YES  Preferred Language for Healthcare:   [x]English       []other:    SUBJECTIVE EXAMINATION     Pain: 0/10    Patient Report/Comments: 18 weeks PO. Doing fine. Right shoulder has no pain. Left shoulder intermittent pain along scapula.       OBJECTIVE EXAMINATION         24   ROM PROM AROM  Comment     L R L R     Flexion   165 OH Cane 160 160     Abduction    150 160     ER   90 @ 90/90 T4 C7      IR   46 GH T8 T9                   Elbow Flexion             Elbow Extension                24   Strength L R Comment   Flexion   5 Tested above elbow   Abduction   5 Tested above elbow   ER   5     IR   5     Supraspinatus         Upper Trap         Lower Trap

## 2024-01-29 ENCOUNTER — APPOINTMENT (OUTPATIENT)
Dept: PHYSICAL THERAPY | Age: 69
End: 2024-01-29
Payer: COMMERCIAL

## 2024-01-31 ENCOUNTER — HOSPITAL ENCOUNTER (OUTPATIENT)
Dept: PHYSICAL THERAPY | Age: 69
Setting detail: THERAPIES SERIES
Discharge: HOME | End: 2024-01-31
Payer: COMMERCIAL

## 2024-01-31 PROCEDURE — 97530 THERAPEUTIC ACTIVITIES: CPT

## 2024-01-31 PROCEDURE — 97110 THERAPEUTIC EXERCISES: CPT

## 2024-01-31 PROCEDURE — 97112 NEUROMUSCULAR REEDUCATION: CPT

## 2024-01-31 NOTE — FLOWSHEET NOTE
Note: If patient does not return for scheduled/recommended follow up visits, this note will serve as a discharge from care along with the most recent update on progress.

## 2024-02-13 ENCOUNTER — OFFICE VISIT (OUTPATIENT)
Dept: INTERNAL MEDICINE CLINIC | Age: 69
End: 2024-02-13
Payer: COMMERCIAL

## 2024-02-13 VITALS
BODY MASS INDEX: 28.04 KG/M2 | TEMPERATURE: 98.4 F | HEART RATE: 81 BPM | OXYGEN SATURATION: 97 % | SYSTOLIC BLOOD PRESSURE: 145 MMHG | WEIGHT: 185 LBS | HEIGHT: 68 IN | DIASTOLIC BLOOD PRESSURE: 87 MMHG

## 2024-02-13 DIAGNOSIS — N40.0 BENIGN PROSTATIC HYPERPLASIA WITHOUT LOWER URINARY TRACT SYMPTOMS: ICD-10-CM

## 2024-02-13 DIAGNOSIS — J45.20 MILD INTERMITTENT EXTRINSIC ASTHMA WITHOUT COMPLICATION: Primary | ICD-10-CM

## 2024-02-13 DIAGNOSIS — I10 HYPERTENSION, BENIGN: ICD-10-CM

## 2024-02-13 PROCEDURE — 3077F SYST BP >= 140 MM HG: CPT | Performed by: STUDENT IN AN ORGANIZED HEALTH CARE EDUCATION/TRAINING PROGRAM

## 2024-02-13 PROCEDURE — 1123F ACP DISCUSS/DSCN MKR DOCD: CPT | Performed by: STUDENT IN AN ORGANIZED HEALTH CARE EDUCATION/TRAINING PROGRAM

## 2024-02-13 PROCEDURE — 99213 OFFICE O/P EST LOW 20 MIN: CPT | Performed by: STUDENT IN AN ORGANIZED HEALTH CARE EDUCATION/TRAINING PROGRAM

## 2024-02-13 PROCEDURE — 3079F DIAST BP 80-89 MM HG: CPT | Performed by: STUDENT IN AN ORGANIZED HEALTH CARE EDUCATION/TRAINING PROGRAM

## 2024-02-13 RX ORDER — ZINC GLUCONATE 50 MG
50 TABLET ORAL DAILY
COMMUNITY

## 2024-02-13 RX ORDER — CYST/ALA/Q10/PHOS.SER/DHA/BROC 100-20-50
POWDER (GRAM) ORAL
COMMUNITY

## 2024-02-13 SDOH — ECONOMIC STABILITY: FOOD INSECURITY: WITHIN THE PAST 12 MONTHS, THE FOOD YOU BOUGHT JUST DIDN'T LAST AND YOU DIDN'T HAVE MONEY TO GET MORE.: NEVER TRUE

## 2024-02-13 SDOH — ECONOMIC STABILITY: FOOD INSECURITY: WITHIN THE PAST 12 MONTHS, YOU WORRIED THAT YOUR FOOD WOULD RUN OUT BEFORE YOU GOT MONEY TO BUY MORE.: NEVER TRUE

## 2024-02-13 SDOH — ECONOMIC STABILITY: HOUSING INSECURITY
IN THE LAST 12 MONTHS, WAS THERE A TIME WHEN YOU DID NOT HAVE A STEADY PLACE TO SLEEP OR SLEPT IN A SHELTER (INCLUDING NOW)?: NO

## 2024-02-13 SDOH — ECONOMIC STABILITY: INCOME INSECURITY: HOW HARD IS IT FOR YOU TO PAY FOR THE VERY BASICS LIKE FOOD, HOUSING, MEDICAL CARE, AND HEATING?: NOT HARD AT ALL

## 2024-02-13 ASSESSMENT — PATIENT HEALTH QUESTIONNAIRE - PHQ9
SUM OF ALL RESPONSES TO PHQ QUESTIONS 1-9: 0
2. FEELING DOWN, DEPRESSED OR HOPELESS: 0
1. LITTLE INTEREST OR PLEASURE IN DOING THINGS: 0
SUM OF ALL RESPONSES TO PHQ9 QUESTIONS 1 & 2: 0

## 2024-02-13 NOTE — PROGRESS NOTES
[facility]  Clinic Progress note:    Kane Jj is a 68 y.o. male with the past medical history as listed below presenting to the clinic for new patient visit.    Chief Complaint   Patient presents with    New Patient     Pt stated that his doctor retired and he is looking for a new provider.      Hypertension: Patient reports blood pressure has been elevated. He takes amlodipine and has been on this medications for years.    Kidney stones: Patient reports kidney stones about 5 years ago. Patient did have lithotripsy on last stone. Patient is drinking plenty of water.     Chronic bronchitis: Patient reports intermittent trouble breathing. He did have asthma as a kid and had similar symptoms.     BPH: Patient is compliant with tamsulosin.     Osteomyelitis of left shoulder: Patient reports a partial implant on left shoulder. He did have increased pain and was found to have osteomyelitis. He did have PICC line and IV antibiotics followed by total shoulder replacement    Past Medical History:   Diagnosis Date    Arthritis     Bronchitis     Diverticulitis     Hyperlipidemia     Hypertension     Kidney stone     Prolonged emergence from general anesthesia      Past Surgical History:   Procedure Laterality Date    HEEL SPUR SURGERY      HERNIA REPAIR      JOINT REPLACEMENT  2008    Knee Replacements bilaterally    NASAL SEPTUM SURGERY      SHOULDER ARTHROPLASTY Left 2013    Left shoulder    SHOULDER ARTHROPLASTY Right 9/15/2023    RIGHT TOTAL SHOULDER REPLACEMENT WITH PATIENT SPECIFIC INSTRUMENTATION, OPEN BICEPS TENODESIS performed by Merritt Medina MD at [facility] OR    TOTAL KNEE ARTHROPLASTY      UP UVULOPALATOPHARYGOPLASTY  1998     Social History     Socioeconomic History    Marital status:      Spouse name: Not on file    Number of children: Not on file    Years of education: Not on file    Highest education level: Not on file   Occupational History    Not on file   Tobacco Use    Smoking

## 2024-02-19 ENCOUNTER — HOSPITAL ENCOUNTER (OUTPATIENT)
Dept: PHYSICAL THERAPY | Age: 69
Setting detail: THERAPIES SERIES
Discharge: HOME | End: 2024-02-19
Payer: COMMERCIAL

## 2024-02-19 PROCEDURE — 97110 THERAPEUTIC EXERCISES: CPT

## 2024-02-19 NOTE — PLAN OF CARE
NEUROMUSCULAR RE-EDUCATION- Therapeutic procedure, 1 or more areas, each 15 minutes; neuromuscular reeducation of movement, balance, coordination, kinesthetic sense, posture, and/or proprioception for sitting and/or standing activities  (91372)HOME EXERCISE PROGRAM- Reviewed/Progressed HEP activities related to neuromuscular reeducation of movement, balance, coordination, kinesthetic sense, posture, and/or proprioception for sitting and/or standing activities    (10831) THERAPEUTIC ACTIVITY- use of dynamic activities to improve functional performance. (Ex include squatting, ascending/descending stairs, walking, bending, lifting, catching, throwing, pushing, pulling, jumping.)  Direct, one on one contact, billed in 15-minute increments.  (08848) MANUAL THERAPY-  Manual therapy techniques, 1 or more regions, each 15 minutes (Mobilization/manipulation, manual lymphatic drainage, manual traction) for the purpose of modulating pain, promoting relaxation,  increasing ROM, reducing/eliminating soft tissue swelling/inflammation/restriction, improving soft tissue extensibility and allowing for proper ROM for normal function with self care, mobility, lifting and ambulation    TREATMENT PLAN   Plan: Discharge 2/19/24    Electronically Signed by Naseem Mann PT, DPT              Date: 02/19/2024     Note: If patient does not return for scheduled/recommended follow up visits, this note will serve as a discharge from care along with the most recent update on progress.

## 2024-02-21 ENCOUNTER — OFFICE VISIT (OUTPATIENT)
Dept: ORTHOPEDIC SURGERY | Age: 69
End: 2024-02-21
Payer: COMMERCIAL

## 2024-02-21 VITALS — WEIGHT: 185 LBS | BODY MASS INDEX: 28.04 KG/M2 | HEIGHT: 68 IN

## 2024-02-21 DIAGNOSIS — Z96.612 HISTORY OF TOTAL REPLACEMENT OF LEFT SHOULDER JOINT: ICD-10-CM

## 2024-02-21 DIAGNOSIS — Z96.611 HISTORY OF TOTAL REPLACEMENT OF RIGHT SHOULDER JOINT: ICD-10-CM

## 2024-02-21 DIAGNOSIS — M25.512 LEFT SHOULDER PAIN, UNSPECIFIED CHRONICITY: Primary | ICD-10-CM

## 2024-02-21 PROCEDURE — 99213 OFFICE O/P EST LOW 20 MIN: CPT | Performed by: ORTHOPAEDIC SURGERY

## 2024-02-21 PROCEDURE — 1123F ACP DISCUSS/DSCN MKR DOCD: CPT | Performed by: ORTHOPAEDIC SURGERY

## 2024-02-21 NOTE — PROGRESS NOTES
[facility]  History and Physical  Shoulder Pain    Date:  2024    Name:  Kane Jj  Address:  [address] [place], [place] [address]    :  1955      Age:   68 y.o.    SSN:  [id number]      Medical Record Number:  [id number]    Reason for Visit:    Shoulder Pain (F/U BILAT SHOULDERS)      HPI:   Kane Jj is a 68 y.o. male who presents to our office today for follow up of both shoulders.  He underwent right anatomic total shoulder arthroplasty on September 15, 2023.  He continues to do well and his motion is improving.  He has a history of a left anatomic total shoulder arthroplasty approximately 10 years ago.  He has been doing well but he had some muscle tightness and spasms about his posterior shoulder musculature about a month ago.  This is mildly improved with time and with some therapy.  He also has a history of degenerative disc disease of his cervical spine.  He is otherwise in his usual state of health.      Pain Assessment  Location of Pain: Shoulder  Location Modifiers: Left, Right  Severity of Pain: 7  Quality of Pain: Aching, Dull, Sharp, Throbbing  Duration of Pain: Other (Comment)  Frequency of Pain: Intermittent  Aggravating Factors: Bending, Stretching, Exercise, Straightening  Relieving Factors: Rest, Ice    No notes on file    Review of Systems:  A 14 point review of systems available in the scanned medical record as documented by the patient and reviewed on 2024.  The review is negative with the exception of those things mentioned in the History of Present Illness and Past Medical History.      Past Medical History:  Patient's medications, allergies, past medical, surgical, social and family histories were reviewed and updated as appropriate.    Allergies:  Allergies   Allergen Reactions    Vicodin [Hydrocodone-Acetaminophen] Swelling    Codeine Swelling     Facial swelling       Physical Exam:  There were no vitals filed for

## 2024-03-14 ENCOUNTER — HOSPITAL ENCOUNTER (OUTPATIENT)
Dept: PHYSICAL THERAPY | Age: 69
Setting detail: THERAPIES SERIES
Discharge: HOME | End: 2024-03-14
Payer: COMMERCIAL

## 2024-03-14 DIAGNOSIS — M54.2 NECK PAIN: Primary | ICD-10-CM

## 2024-03-14 DIAGNOSIS — M43.6 NECK STIFFNESS: ICD-10-CM

## 2024-03-14 PROCEDURE — 97110 THERAPEUTIC EXERCISES: CPT

## 2024-03-14 PROCEDURE — 97162 PT EVAL MOD COMPLEX 30 MIN: CPT

## 2024-03-14 PROCEDURE — 97140 MANUAL THERAPY 1/> REGIONS: CPT

## 2024-03-14 NOTE — PLAN OF CARE
assist with return top prior level of function.   Status: [] Progressing: [] Met: [] Not Met: [] Adjusted  Improve cervical flexion, extension, R and L side bending to WNL without pain  to allow for proper joint functioning as indicated by patients functional deficits.  Status: [] Progressing: [] Met: [] Not Met: [] Adjusted  Pt to improve strength to show motor control/activation of deep cervical flexors to facilitate functional mobility and ADLs.    Status: [] Progressing: [] Met: [] Not Met: [] Adjusted  Patient will return to  Lifting without increased symptoms or restriction to work towards return to prior level of function.        Status: [] Progressing: [] Met: [] Not Met: [] Adjusted  Patient will resume normal work/leisure activities without pain.            Status: [] Progressing: [] Met: [] Not Met: [] Adjusted    TREATMENT PLAN     Frequency/Duration: 2x/week for 4 weeks for the following treatment interventions:    Interventions:  [x] Therapeutic exercise including: strength training, ROM, including postural re-education.   [x] NMR activation and proprioception, including postural re-education.    [x] Manual therapy as indicated to include: PROM, Gr I-IV mobilizations, STM, Grade V Manipulation, and Dry Needling/IASTM  [x] Modalities as needed that may include: Cryotherapy  [x] Patient education on joint protection, postural re-education, activity modification, progression of HEP.        [] Aquatic Therapy    Plan: POC initiated as per evaluation    Electronically Signed by Naseem Mann, PT, DPT, OCS  Date: 03/14/2024     Note: Portions of this note have been templated and/or copied from initial evaluation, reassessments and prior notes for documentation efficiency.

## 2024-03-18 ENCOUNTER — HOSPITAL ENCOUNTER (OUTPATIENT)
Dept: PHYSICAL THERAPY | Age: 69
Setting detail: THERAPIES SERIES
Discharge: HOME | End: 2024-03-18
Payer: COMMERCIAL

## 2024-03-18 PROCEDURE — 97110 THERAPEUTIC EXERCISES: CPT

## 2024-03-18 PROCEDURE — 97012 MECHANICAL TRACTION THERAPY: CPT

## 2024-03-18 PROCEDURE — 97140 MANUAL THERAPY 1/> REGIONS: CPT

## 2024-03-18 NOTE — FLOWSHEET NOTE
include: Cryotherapy  [x] Patient education on joint protection, postural re-education, activity modification, progression of HEP.        [] Aquatic Therapy    Plan: POC initiated as per evaluation    Electronically Signed by Naseem Mann, PT, DPT, OCS  Date: 03/18/2024     Note: Portions of this note have been templated and/or copied from initial evaluation, reassessments and prior notes for documentation efficiency.

## 2024-03-21 ENCOUNTER — HOSPITAL ENCOUNTER (OUTPATIENT)
Dept: PHYSICAL THERAPY | Age: 69
Setting detail: THERAPIES SERIES
Discharge: HOME | End: 2024-03-21
Payer: COMMERCIAL

## 2024-03-21 PROCEDURE — 97110 THERAPEUTIC EXERCISES: CPT

## 2024-03-21 PROCEDURE — 97012 MECHANICAL TRACTION THERAPY: CPT

## 2024-03-21 PROCEDURE — 97140 MANUAL THERAPY 1/> REGIONS: CPT

## 2024-03-21 NOTE — FLOWSHEET NOTE
([facility]).       OUTCOME MEASURE DATE % Deficit   NDI 3/14/24 24%              OBJECTIVE EXAMINATION     3/14/24  ROM AROM Comments   Flexion 50    Extension 50! Pain in scapula   Side bend R 20 Left UT tightness   Side bend L 30    Rotation R 50    Rotation L 50              *Right side bending up to 40 deg with decreased pain after manual interventions 3/14/24    3/14/24 deferred  Strength L R Comments   Neck flexion (C1-2)      Neck side bend (C3)      Shoulder elevation (C4)      Shoulder abduction (C5)      Elbow flexion and /or wrist extension  (C6)      Elbow extension and/or wrist flexion  (C7)      Thumb extension and/or ulnar deviation ((C8)      Abduction and /or adduction of hand intrinsics (T1)        3/14/24   Special Test Results/Comment   Spurling's    Cervical Distraction Relieves symptoms in shoulder blade   ULTT 1-Median    ULTT 2-Median    ULTT 3-Radial    ULTT 4-Ulnar    Flexion Rotation Test    Neck Flexor Muscle Endurance Test    Angelina C-Spine Rules    Alar Ligament Testing    Terri Test    Adson's Test    Quadrant Pos         3/14/24  Dermatomes Normal Abnormal Comments   Superior head (C1)  x     Occiput (C2) x     Lateral neck (C3) x     AC joint (C4) x     Lateral shoulder (C5) x     Distal thumb (C6) x     Middle finger (C7) x     Distal 5th digit (C8) x     Inner forearm (T1) x     Inner upper arm (T2) x       3/14/24 deferred  Reflexes Normal Abnormal Comments   S1-2 Seated achilles      S1-2 Prone knee bend      L3-4 Patellar tendon      C5-6 Biceps      C6 Brachioradialis      C7-8 Triceps      Clonus      Babinski      Norman's          Joint mobility:    [x]Normal: cervical side glides WNL   [x]Hypo: left 1st and 2nd ribs; thoracic spine   []Hyper    Palpation: TTP UT, LS and 1st / 2nd ribs    Functional Mobility/Transfers: slow moving / guarded with neck movements    Posture: kyphotic, forward head    Bandages/Dressings/Incisions: n/a    Gait: (include devices/WB status):

## 2024-03-27 ENCOUNTER — APPOINTMENT (OUTPATIENT)
Dept: PHYSICAL THERAPY | Age: 69
End: 2024-03-27
Payer: COMMERCIAL

## 2024-04-03 ENCOUNTER — HOSPITAL ENCOUNTER (OUTPATIENT)
Dept: PHYSICAL THERAPY | Age: 69
Setting detail: THERAPIES SERIES
Discharge: HOME | End: 2024-04-03
Payer: COMMERCIAL

## 2024-04-03 PROCEDURE — 97112 NEUROMUSCULAR REEDUCATION: CPT

## 2024-04-03 PROCEDURE — 97012 MECHANICAL TRACTION THERAPY: CPT

## 2024-04-03 PROCEDURE — 97110 THERAPEUTIC EXERCISES: CPT

## 2024-04-03 PROCEDURE — 97140 MANUAL THERAPY 1/> REGIONS: CPT

## 2024-04-03 NOTE — FLOWSHEET NOTE
Group Therapy (71524)     Estim Attended (30425)   Canalith Repositioning (71190)     Other:   Other: HP 15' supine x   Total Timed Code Tx Minutes 40'  25'     Total Treatment Minutes 65'        Charge Justification:  (92260) THERAPEUTIC EXERCISE - Provided verbal/tactile cueing for activities related to strengthening, flexibility, endurance, ROM performed to prevent loss of range of motion, maintain or improve muscular strength or increase flexibility, following either an injury or surgery.   (57408) HOME EXERCISE PROGRAM - Reviewed/Progressed HEP activities related to strengthening, flexibility, endurance, ROM performed to prevent loss of range of motion, maintain or improve muscular strength or increase flexibility, following either an injury or surgery.  (28304) NEUROMUSCULAR RE-EDUCATION - Therapeutic procedure, 1 or more areas, each 15 minutes; neuromuscular reeducation of movement, balance, coordination, kinesthetic sense, posture, and/or proprioception for sitting and/or standing activities  (40181) HOME EXERCISE PROGRAM - Reviewed/Progressed HEP activities related to neuromuscular reeducation of movement, balance, coordination, kinesthetic sense, posture, and/or proprioception for sitting and/or standing activities    (87044) THERAPEUTIC ACTIVITY - use of dynamic activities to improve functional performance. (Ex include squatting, ascending/descending stairs, walking, bending, lifting, catching, throwing, pushing, pulling, jumping.)  Direct, one on one contact, billed in 15-minute increments.  (52569) MANUAL THERAPY -  Manual therapy techniques, 1 or more regions, each 15 minutes (Mobilization/manipulation, manual lymphatic drainage, manual traction) for the purpose of modulating pain, promoting relaxation,  increasing ROM, reducing/eliminating soft tissue swelling/inflammation/restriction, improving soft tissue extensibility and allowing for proper ROM for normal function with self care, mobility, lifting

## 2024-04-05 ENCOUNTER — HOSPITAL ENCOUNTER (OUTPATIENT)
Dept: PHYSICAL THERAPY | Age: 69
Setting detail: THERAPIES SERIES
Discharge: HOME | End: 2024-04-05
Payer: COMMERCIAL

## 2024-04-05 ENCOUNTER — HOSPITAL ENCOUNTER (OUTPATIENT)
Age: 69
Discharge: HOME | End: 2024-04-05
Payer: COMMERCIAL

## 2024-04-05 ENCOUNTER — HOSPITAL ENCOUNTER (OUTPATIENT)
Dept: GENERAL RADIOLOGY | Age: 69
Discharge: HOME | End: 2024-04-05
Attending: STUDENT IN AN ORGANIZED HEALTH CARE EDUCATION/TRAINING PROGRAM
Payer: COMMERCIAL

## 2024-04-05 DIAGNOSIS — R07.9 CHEST PAIN, UNSPECIFIED TYPE: Primary | ICD-10-CM

## 2024-04-05 DIAGNOSIS — R07.9 CHEST PAIN, UNSPECIFIED TYPE: ICD-10-CM

## 2024-04-05 PROCEDURE — 97012 MECHANICAL TRACTION THERAPY: CPT

## 2024-04-05 PROCEDURE — 97112 NEUROMUSCULAR REEDUCATION: CPT

## 2024-04-05 PROCEDURE — 97110 THERAPEUTIC EXERCISES: CPT

## 2024-04-05 PROCEDURE — 97140 MANUAL THERAPY 1/> REGIONS: CPT

## 2024-04-05 PROCEDURE — 71110 X-RAY EXAM RIBS BIL 3 VIEWS: CPT

## 2024-04-05 PROCEDURE — 71046 X-RAY EXAM CHEST 2 VIEWS: CPT

## 2024-04-05 NOTE — FLOWSHEET NOTE
(22757)     Ultrasound (77209)   Group Therapy (49350)     Estim Attended (66019)   Canalith Repositioning (60065)     Other:   Other: HP 15' supine x   Total Timed Code Tx Minutes 40'  25'     Total Treatment Minutes 65'        Charge Justification:  (56405) THERAPEUTIC EXERCISE - Provided verbal/tactile cueing for activities related to strengthening, flexibility, endurance, ROM performed to prevent loss of range of motion, maintain or improve muscular strength or increase flexibility, following either an injury or surgery.   (38882) HOME EXERCISE PROGRAM - Reviewed/Progressed HEP activities related to strengthening, flexibility, endurance, ROM performed to prevent loss of range of motion, maintain or improve muscular strength or increase flexibility, following either an injury or surgery.  (89253) NEUROMUSCULAR RE-EDUCATION - Therapeutic procedure, 1 or more areas, each 15 minutes; neuromuscular reeducation of movement, balance, coordination, kinesthetic sense, posture, and/or proprioception for sitting and/or standing activities  (72766) HOME EXERCISE PROGRAM - Reviewed/Progressed HEP activities related to neuromuscular reeducation of movement, balance, coordination, kinesthetic sense, posture, and/or proprioception for sitting and/or standing activities    (39002) THERAPEUTIC ACTIVITY - use of dynamic activities to improve functional performance. (Ex include squatting, ascending/descending stairs, walking, bending, lifting, catching, throwing, pushing, pulling, jumping.)  Direct, one on one contact, billed in 15-minute increments.  (04882) MANUAL THERAPY -  Manual therapy techniques, 1 or more regions, each 15 minutes (Mobilization/manipulation, manual lymphatic drainage, manual traction) for the purpose of modulating pain, promoting relaxation,  increasing ROM, reducing/eliminating soft tissue swelling/inflammation/restriction, improving soft tissue extensibility and allowing for proper ROM for normal function

## 2024-04-08 ENCOUNTER — HOSPITAL ENCOUNTER (OUTPATIENT)
Dept: PHYSICAL THERAPY | Age: 69
Setting detail: THERAPIES SERIES
Discharge: HOME | End: 2024-04-08
Payer: COMMERCIAL

## 2024-04-08 ENCOUNTER — TELEPHONE (OUTPATIENT)
Dept: INTERNAL MEDICINE CLINIC | Age: 69
End: 2024-04-08

## 2024-04-08 PROCEDURE — 97140 MANUAL THERAPY 1/> REGIONS: CPT

## 2024-04-08 PROCEDURE — 97112 NEUROMUSCULAR REEDUCATION: CPT

## 2024-04-08 PROCEDURE — 97110 THERAPEUTIC EXERCISES: CPT

## 2024-04-08 NOTE — FLOWSHEET NOTE
/ 2nd ribs    Functional Mobility/Transfers: slow moving / guarded with neck movements    Posture: kyphotic, forward head    Bandages/Dressings/Incisions: n/a    Gait: (include devices/WB status): WNL      Exercises/Interventions     Exercise/Equipment Resistance/Repetitions Other comments   Stretching/PROM     CROM     Chin tuck 10x10\" supine   Chin tuck + lift 1x15          Scalene stretch     Pectoral stretch 5x30\" Towel roll under T-Spine; hands behind head           Open Books 10x10\"ea way Hand behind head \"chicken wing\" to decrease shoulder stress           Cane ER at 90/90 10x10\" on Left              Isometrics     shrugs     Cervical Flexion      Cervical Extension     Cervical sidebending               PRE's     External Rotation     Internal Rotation     Serratus     Biceps     Triceps     Shrugs     Horizontal Abd with ER 2x10ea; 2# opp arm on foot stool EOB for forward to rest on    Reverse Flys     EXT     Flexion     Abduction     Bent over row 3x10ea; 5# DB Single arm row; opp arm on foot stool EOB for forward to rest on    Wall Wilson X15 Supie         Cable Column/Theraband     Scapular Retraction     External Rotation     Internal Rotation     Ext     TRIC     Lats     Shrugs     Flex     BIC     PNF     No Money X15 Green        Manual Intervention     Cerv mobs/manip 5' Grade II Side glides on right to open up left; bias with right SB and right Rot +/- cervical flexion   Thoracic mobs/manip     CT manip     Traction 3' Manual: straight and with right SB/rotation   IASTM 3' Left UT and LS         Modalities:      Education/Home Exercise Program:  Patient instructed on HEP on this date with handout provided and all questions answered. Discussions about how to progress sets/reps/resistance as necessary for fatigue and challenge. Patient was instructed to contact PT with any questions or concerns about HEP moving forward. Patient verbally stated she/he understood.   Access Code: [email or web address]  URL:

## 2024-04-10 ENCOUNTER — HOSPITAL ENCOUNTER (OUTPATIENT)
Dept: PHYSICAL THERAPY | Age: 69
Setting detail: THERAPIES SERIES
Discharge: HOME | End: 2024-04-10
Payer: COMMERCIAL

## 2024-04-10 PROCEDURE — 97530 THERAPEUTIC ACTIVITIES: CPT

## 2024-04-10 PROCEDURE — 97110 THERAPEUTIC EXERCISES: CPT

## 2024-04-10 NOTE — PLAN OF CARE
[facility]- Outpatient Rehabilitation and Therapy [address], [place] [address] office: [phone] fax: [phone]        Physical Therapy Re-Certification Plan of Care    Dear  Trina Mar, NGA-CNP,    We had the pleasure of treating the following patient for physical therapy services at [facility].  A summary of our findings can be found in the updated assessment below.  This includes our plan of care.  If you have any questions or concerns regarding these findings, please do not hesitate to contact me at [phone].  Thank you for the referral.     Physician Signature:________________________________Date:__________________  By signing above (or electronic signature), therapist’s plan is approved by physician      Overall Response to Treatment:   [x]Patient is responding well to treatment and improvement is noted with regards  to goals   []Patient should continue to improve in reasonable time if they continue HEP   []Patient has plateaued and is no longer responding to skilled PT intervention    []Patient is getting worse and would benefit from return to referring MD   []Patient unable to adhere to initial POC   [x]Other: Has made great progress to date with regards to neck pain and pain along left medial scapular border. Cervical ROM improved in all planes with no pain radiating to scapula today with cervical extension. He does still get pain along anterior chest wall on left side near sternum that does not follow any specific patterns. When pain occurs, he has small protrusion with pain to palpation near 4th rib at costo-sternal junction. Unsure if rib is subluxing but patient has not had any SG to cause this. I have made PCP office aware and thus far they have performed a chest x-ray and rib x-ray. Stressed to patient to continue to monitor these symptoms and seek further evaluation through his PCP's office. At this time he will transition to

## 2024-04-16 ENCOUNTER — OFFICE VISIT (OUTPATIENT)
Dept: INTERNAL MEDICINE CLINIC | Age: 69
End: 2024-04-16
Payer: COMMERCIAL

## 2024-04-16 VITALS
SYSTOLIC BLOOD PRESSURE: 118 MMHG | BODY MASS INDEX: 28.44 KG/M2 | WEIGHT: 187 LBS | OXYGEN SATURATION: 97 % | DIASTOLIC BLOOD PRESSURE: 82 MMHG | TEMPERATURE: 98.3 F | HEART RATE: 86 BPM

## 2024-04-16 DIAGNOSIS — M94.0 COSTOCHONDRITIS: Primary | ICD-10-CM

## 2024-04-16 PROCEDURE — 3079F DIAST BP 80-89 MM HG: CPT | Performed by: STUDENT IN AN ORGANIZED HEALTH CARE EDUCATION/TRAINING PROGRAM

## 2024-04-16 PROCEDURE — 3074F SYST BP LT 130 MM HG: CPT | Performed by: STUDENT IN AN ORGANIZED HEALTH CARE EDUCATION/TRAINING PROGRAM

## 2024-04-16 PROCEDURE — 1123F ACP DISCUSS/DSCN MKR DOCD: CPT | Performed by: STUDENT IN AN ORGANIZED HEALTH CARE EDUCATION/TRAINING PROGRAM

## 2024-04-16 PROCEDURE — 99213 OFFICE O/P EST LOW 20 MIN: CPT | Performed by: STUDENT IN AN ORGANIZED HEALTH CARE EDUCATION/TRAINING PROGRAM

## 2024-04-16 RX ORDER — PREDNISONE 20 MG/1
TABLET ORAL
Qty: 17 TABLET | Refills: 0 | Status: SHIPPED | OUTPATIENT
Start: 2024-04-16 | End: 2024-04-26

## 2024-04-16 NOTE — PROGRESS NOTES
[facility] Internal Medicine  Clinic Progress note:    Kane Jj is a 69 y.o. male with the past medical history as listed below presenting to the clinic for follow up on chronic condition and discussion of preventative health care.    Chief Complaint   Patient presents with    Rib Pain     Patient is having pain in rib in the right side of chest.  Patient reports he noticed to physical therapy.  He reports some days it is worse some days its better.  It is usually not worse with a big deep breath.  He does report a lot of tenderness in that area    Past Medical History:   Diagnosis Date    Arthritis     Bronchitis     Diverticulitis     Hyperlipidemia     Hypertension     Kidney stone     Prolonged emergence from general anesthesia      Past Surgical History:   Procedure Laterality Date    HEEL SPUR SURGERY      HERNIA REPAIR      JOINT REPLACEMENT  2008    Knee Replacements bilaterally    NASAL SEPTUM SURGERY      SHOULDER ARTHROPLASTY Left 2013    Left shoulder    SHOULDER ARTHROPLASTY Right 9/15/2023    RIGHT TOTAL SHOULDER REPLACEMENT WITH PATIENT SPECIFIC INSTRUMENTATION, OPEN BICEPS TENODESIS performed by Merritt Medina MD at [facility] OR    TOTAL KNEE ARTHROPLASTY      UP UVULOPALATOPHARYGOPLASTY  1998     Social History     Socioeconomic History    Marital status:      Spouse name: Not on file    Number of children: Not on file    Years of education: Not on file    Highest education level: Not on file   Occupational History    Not on file   Tobacco Use    Smoking status: Former     Current packs/day: 0.00     Types: Cigarettes     Quit date:      Years since quittin.3     Passive exposure: Past    Smokeless tobacco: Never   Vaping Use    Vaping Use: Never used   Substance and Sexual Activity    Alcohol use: Yes     Comment: rare    Drug use: No    Sexual activity: Yes     Partners: Female   Other Topics Concern    Not on file   Social History Narrative    Not on file     Social

## 2024-04-25 ENCOUNTER — OFFICE VISIT (OUTPATIENT)
Dept: INTERNAL MEDICINE CLINIC | Age: 69
End: 2024-04-25
Payer: COMMERCIAL

## 2024-04-25 VITALS
OXYGEN SATURATION: 97 % | BODY MASS INDEX: 28.14 KG/M2 | WEIGHT: 185 LBS | SYSTOLIC BLOOD PRESSURE: 148 MMHG | TEMPERATURE: 98.1 F | DIASTOLIC BLOOD PRESSURE: 84 MMHG | HEART RATE: 60 BPM

## 2024-04-25 DIAGNOSIS — I10 HYPERTENSION, BENIGN: Primary | ICD-10-CM

## 2024-04-25 PROCEDURE — 1123F ACP DISCUSS/DSCN MKR DOCD: CPT | Performed by: STUDENT IN AN ORGANIZED HEALTH CARE EDUCATION/TRAINING PROGRAM

## 2024-04-25 PROCEDURE — 3077F SYST BP >= 140 MM HG: CPT | Performed by: STUDENT IN AN ORGANIZED HEALTH CARE EDUCATION/TRAINING PROGRAM

## 2024-04-25 PROCEDURE — 99213 OFFICE O/P EST LOW 20 MIN: CPT | Performed by: STUDENT IN AN ORGANIZED HEALTH CARE EDUCATION/TRAINING PROGRAM

## 2024-04-25 PROCEDURE — 3078F DIAST BP <80 MM HG: CPT | Performed by: STUDENT IN AN ORGANIZED HEALTH CARE EDUCATION/TRAINING PROGRAM

## 2024-04-25 RX ORDER — HYDROCHLOROTHIAZIDE 12.5 MG/1
12.5 CAPSULE, GELATIN COATED ORAL EVERY MORNING
Qty: 90 CAPSULE | Refills: 3 | Status: SHIPPED | OUTPATIENT
Start: 2024-04-25

## 2024-04-25 RX ORDER — AMLODIPINE BESYLATE 10 MG/1
10 TABLET ORAL DAILY
Qty: 90 TABLET | Refills: 3 | Status: SHIPPED | OUTPATIENT
Start: 2024-04-25

## 2024-04-25 RX ORDER — METHYLPREDNISOLONE 4 MG
TABLET, DOSE PACK ORAL
COMMUNITY
Start: 2024-03-06

## 2024-04-25 RX ORDER — BUTYROSPERMUM PARKII(SHEA BUTTER), SIMMONDSIA CHINENSIS (JOJOBA) SEED OIL, ALOE BARBADENSIS LEAF EXTRACT .01; 1; 3.5 G/100G; G/100G; G/100G
LIQUID TOPICAL
COMMUNITY

## 2024-04-25 NOTE — PROGRESS NOTES
[facility] Internal Medicine  Clinic Progress note:    Kane Jj is a 69 y.o. male with the past medical history as listed below presenting to the clinic for follow up on chronic condition and discussion of preventative health care.    Chief Complaint   Patient presents with    Annual Exam     F/u from last week            Past Medical History:   Diagnosis Date    Arthritis     Bronchitis     Diverticulitis     Hyperlipidemia     Hypertension     Kidney stone     Prolonged emergence from general anesthesia        Past Surgical History:   Procedure Laterality Date    HEEL SPUR SURGERY      HERNIA REPAIR      JOINT REPLACEMENT      Knee Replacements bilaterally    NASAL SEPTUM SURGERY      SHOULDER ARTHROPLASTY Left     Left shoulder    SHOULDER ARTHROPLASTY Right 9/15/2023    RIGHT TOTAL SHOULDER REPLACEMENT WITH PATIENT SPECIFIC INSTRUMENTATION, OPEN BICEPS TENODESIS performed by Merritt Medina MD at [facility] OR    TOTAL KNEE ARTHROPLASTY      UP UVULOPALATOPHARYGOPLASTY  1998       Social History     Socioeconomic History    Marital status:      Spouse name: Not on file    Number of children: Not on file    Years of education: Not on file    Highest education level: Not on file   Occupational History    Not on file   Tobacco Use    Smoking status: Former     Current packs/day: 0.00     Types: Cigarettes     Quit date:      Years since quittin.3     Passive exposure: Past    Smokeless tobacco: Never   Vaping Use    Vaping Use: Never used   Substance and Sexual Activity    Alcohol use: Yes     Comment: rare    Drug use: No    Sexual activity: Yes     Partners: Female   Other Topics Concern    Not on file   Social History Narrative    Not on file     Social Determinants of Health     Financial Resource Strain: Low Risk  (2024)    Overall Financial Resource Strain (CARDIA)     Difficulty of Paying Living Expenses: Not hard at all   Food Insecurity: No Food Insecurity (2024)

## 2024-07-19 ENCOUNTER — COMMUNITY OUTREACH (OUTPATIENT)
Dept: INTERNAL MEDICINE CLINIC | Age: 69
End: 2024-07-19

## 2024-07-19 NOTE — PROGRESS NOTES
From: Ronit Asher  To: An Romanoshubham  Sent: 4/28/2021 9:01 AM CDT  Subject: Non-Urgent Medical Question    Hi,  I have 2 blood pressure meds from former doctor.  Would you please renew:  1) Benazepril Hydrochloride Tab 20mg  2) Hydrochlorothiazide Tab 12.5 mg    New mail in pharmacy through [facility] member ID [id number]  fax# [phone]  Phone# [phone]   Thank you   Patient's HM shows they are overdue for Colorectal Screening.   Care Everywhere and  files searched.  HM updated.

## 2024-09-19 ENCOUNTER — OFFICE VISIT (OUTPATIENT)
Dept: INTERNAL MEDICINE CLINIC | Age: 69
End: 2024-09-19
Payer: COMMERCIAL

## 2024-09-19 VITALS
WEIGHT: 179 LBS | DIASTOLIC BLOOD PRESSURE: 82 MMHG | SYSTOLIC BLOOD PRESSURE: 128 MMHG | OXYGEN SATURATION: 96 % | BODY MASS INDEX: 27.22 KG/M2 | HEART RATE: 55 BPM

## 2024-09-19 DIAGNOSIS — R07.89 OTHER CHEST PAIN: Primary | ICD-10-CM

## 2024-09-19 DIAGNOSIS — R07.81 RIB PAIN ON LEFT SIDE: ICD-10-CM

## 2024-09-19 DIAGNOSIS — I10 HYPERTENSION, BENIGN: ICD-10-CM

## 2024-09-19 DIAGNOSIS — R07.82 INTERCOSTAL PAIN: ICD-10-CM

## 2024-09-19 DIAGNOSIS — M94.0 COSTOCHONDRITIS: ICD-10-CM

## 2024-09-19 PROCEDURE — 3074F SYST BP LT 130 MM HG: CPT | Performed by: STUDENT IN AN ORGANIZED HEALTH CARE EDUCATION/TRAINING PROGRAM

## 2024-09-19 PROCEDURE — 3079F DIAST BP 80-89 MM HG: CPT | Performed by: STUDENT IN AN ORGANIZED HEALTH CARE EDUCATION/TRAINING PROGRAM

## 2024-09-19 PROCEDURE — 99214 OFFICE O/P EST MOD 30 MIN: CPT | Performed by: STUDENT IN AN ORGANIZED HEALTH CARE EDUCATION/TRAINING PROGRAM

## 2024-09-19 PROCEDURE — 93000 ELECTROCARDIOGRAM COMPLETE: CPT | Performed by: STUDENT IN AN ORGANIZED HEALTH CARE EDUCATION/TRAINING PROGRAM

## 2024-09-19 PROCEDURE — 1123F ACP DISCUSS/DSCN MKR DOCD: CPT | Performed by: STUDENT IN AN ORGANIZED HEALTH CARE EDUCATION/TRAINING PROGRAM

## 2024-09-19 RX ORDER — LIDOCAINE 50 MG/G
1 PATCH TOPICAL DAILY
Qty: 10 PATCH | Refills: 0 | Status: SHIPPED | OUTPATIENT
Start: 2024-09-19 | End: 2024-09-29

## 2024-09-19 ASSESSMENT — ENCOUNTER SYMPTOMS
RESPIRATORY NEGATIVE: 1
GASTROINTESTINAL NEGATIVE: 1
SHORTNESS OF BREATH: 0
EYES NEGATIVE: 1
HEMOPTYSIS: 0
NAUSEA: 0
ABDOMINAL PAIN: 0
VOMITING: 0
ALLERGIC/IMMUNOLOGIC NEGATIVE: 1
COUGH: 0
EYE DISCHARGE: 0

## 2024-09-30 ENCOUNTER — HOSPITAL ENCOUNTER (OUTPATIENT)
Dept: CT IMAGING | Age: 69
Discharge: HOME | End: 2024-09-30
Payer: COMMERCIAL

## 2024-09-30 DIAGNOSIS — R07.81 RIB PAIN ON LEFT SIDE: ICD-10-CM

## 2024-09-30 DIAGNOSIS — R07.89 OTHER CHEST PAIN: ICD-10-CM

## 2024-09-30 DIAGNOSIS — M94.0 COSTOCHONDRITIS: ICD-10-CM

## 2024-09-30 DIAGNOSIS — R07.82 INTERCOSTAL PAIN: ICD-10-CM

## 2024-09-30 PROCEDURE — 71250 CT THORAX DX C-: CPT

## 2024-10-01 ENCOUNTER — OFFICE VISIT (OUTPATIENT)
Dept: ORTHOPEDIC SURGERY | Age: 69
End: 2024-10-01

## 2024-10-01 VITALS — WEIGHT: 179 LBS | HEIGHT: 68 IN | BODY MASS INDEX: 27.13 KG/M2

## 2024-10-01 DIAGNOSIS — Z96.612 HISTORY OF TOTAL REPLACEMENT OF LEFT SHOULDER JOINT: ICD-10-CM

## 2024-10-01 DIAGNOSIS — Z96.611 HISTORY OF TOTAL REPLACEMENT OF RIGHT SHOULDER JOINT: Primary | ICD-10-CM

## 2024-10-01 NOTE — PROGRESS NOTES
Chief Complaint    Shoulder Pain (RIGHT SHOULDER)      History of Present Illness:  Kane Jj is a pleasant, 69 y.o., male, here today for follow up of bilateral shoulders. Patient has a history right and left anatomic shoulder replacements. His right shoulder operation was performed on 9/15/23, and the left was operated on about 10 years ago. He reports he is happy with both shoulders and is doing well. He reports no new injuries or setbacks.     Pain Assessment  Location of Pain: Shoulder  Location Modifiers: Right  Severity of Pain: 0  Limiting Behavior: No  Relieving Factors: Rest  Work-Related Injury: No  Are there other pain locations you wish to document?: No      Medical History:  Patient's medications, allergies, past medical, surgical, social and family histories were reviewed and updated as appropriate.    No notes on file    Review of Systems  A 14 point review of systems was completed by the patient and is available in the media section of the scanned medical record and was reviewed on 10/1/2024.  The review is negative with the exception of those things mentioned in the HPI and Past Medical History    Vital Signs:  There were no vitals filed for this visit.    General/Appearance: Alert and oriented and in no apparent distress.    Skin:  There are no skin lesions, cellulitis, or extreme edema. The patient has warm and well-perfused Bilateral upper extremities with brisk capillary refill.      Right Shoulder Exam:  Inspection:  No gross deformities, no signs of infection.    Palpation: Deferred    Active Range of Motion:  Forward Elevation 150, External Rotation 40 vs 50, Internal Rotation T12    Passive Range of Motion: Deferred    Strength:  External Rotation 4+/5, Internal Rotation 4+/5    Special Tests:   No Richard muscle deformity.    Neurovascular: Sensation to light touch is intact, no motor deficits, palpable radial pulses 2+      Radiology:     No new XR obtained at this time.

## 2025-01-22 ENCOUNTER — HOSPITAL ENCOUNTER (EMERGENCY)
Age: 70
Discharge: HOME | End: 2025-01-22
Attending: EMERGENCY MEDICINE
Payer: COMMERCIAL

## 2025-01-22 VITALS
WEIGHT: 182.98 LBS | HEIGHT: 68 IN | BODY MASS INDEX: 27.73 KG/M2 | OXYGEN SATURATION: 100 % | TEMPERATURE: 97.6 F | DIASTOLIC BLOOD PRESSURE: 95 MMHG | SYSTOLIC BLOOD PRESSURE: 143 MMHG | RESPIRATION RATE: 22 BRPM | HEART RATE: 85 BPM

## 2025-01-22 DIAGNOSIS — R04.0 EPISTAXIS: Primary | ICD-10-CM

## 2025-01-22 PROCEDURE — 99283 EMERGENCY DEPT VISIT LOW MDM: CPT

## 2025-01-22 RX ORDER — OXYMETAZOLINE HYDROCHLORIDE 0.05 G/100ML
2 SPRAY NASAL ONCE
Status: DISCONTINUED | OUTPATIENT
Start: 2025-01-22 | End: 2025-01-22 | Stop reason: HOSPADM

## 2025-01-22 RX ORDER — CEPHALEXIN 500 MG/1
500 CAPSULE ORAL 4 TIMES DAILY
Qty: 28 CAPSULE | Refills: 0 | Status: SHIPPED | OUTPATIENT
Start: 2025-01-22 | End: 2025-01-22

## 2025-01-22 ASSESSMENT — LIFESTYLE VARIABLES
HOW OFTEN DO YOU HAVE A DRINK CONTAINING ALCOHOL: NEVER
HOW MANY STANDARD DRINKS CONTAINING ALCOHOL DO YOU HAVE ON A TYPICAL DAY: PATIENT DOES NOT DRINK

## 2025-01-22 ASSESSMENT — PAIN - FUNCTIONAL ASSESSMENT: PAIN_FUNCTIONAL_ASSESSMENT: 0-10

## 2025-01-22 ASSESSMENT — PAIN SCALES - GENERAL: PAINLEVEL_OUTOF10: 0

## 2025-01-22 NOTE — ED PROVIDER NOTES
I PERSONALLY SAW THE PATIENT AND PERFORMED A SUBSTANTIVE PORTION OF THE VISIT INCLUDING ALL ASPECTS OF THE MEDICAL DECISION MAKING PROCESS.    [facility]  EMERGENCY DEPARTMENT ENCOUNTER      Pt Name: Kane Jj  MRN: [id number]  Birthdate 1955  Date of evaluation: 1/22/2025  Provider: Rich Mitchell MD    CHIEF COMPLAINT       Chief Complaint   Patient presents with    Epistaxis     Reports nose bleed x 20 mins/ no injury no pain states he has been having them for the past month       HISTORY OF PRESENT ILLNESS    Kane Jj is a 69 y.o. male who presents to the emergency department with nosebleed.      Nursing Notes were reviewed. Including nursing noted for FM, Surgical History, Past Medical History, Social History, vitals, and allergies; agree with all.     REVIEW OF SYSTEMS       Review of Systems    Except as noted above the remainder of the review of systems was reviewed and negative.     PAST MEDICAL HISTORY     Past Medical History:   Diagnosis Date    Arthritis     Bronchitis     Diverticulitis     Hyperlipidemia     Hypertension     Kidney stone     Prolonged emergence from general anesthesia        SURGICAL HISTORY       Past Surgical History:   Procedure Laterality Date    HEEL SPUR SURGERY      HERNIA REPAIR      JOINT REPLACEMENT  2008    Knee Replacements bilaterally    NASAL SEPTUM SURGERY      SHOULDER ARTHROPLASTY Left 2013    Left shoulder    SHOULDER ARTHROPLASTY Right 9/15/2023    RIGHT TOTAL SHOULDER REPLACEMENT WITH PATIENT SPECIFIC INSTRUMENTATION, OPEN BICEPS TENODESIS performed by Merritt Medina MD at [facility] OR    TOTAL KNEE ARTHROPLASTY      UPPP UVULOPALATOPHARYGOPLASTY  1998       CURRENT MEDICATIONS       Previous Medications    ALBUTEROL (PROVENTIL) (2.5 MG/3ML) 0.083% NEBULIZER SOLUTION    Take 3 mLs by nebulization every 4 hours as needed for Wheezing    ALBUTEROL SULFATE HFA (PROVENTIL;VENTOLIN;PROAIR) 108 (90 BASE) MCG/ACT INHALER    Inhale 2 puffs

## 2025-01-23 ENCOUNTER — HOSPITAL ENCOUNTER (EMERGENCY)
Age: 70
Discharge: HOME | End: 2025-01-23
Attending: EMERGENCY MEDICINE
Payer: MEDICARE

## 2025-01-23 ENCOUNTER — OFFICE VISIT (OUTPATIENT)
Dept: ENT CLINIC | Age: 70
End: 2025-01-23

## 2025-01-23 VITALS
WEIGHT: 185.41 LBS | HEART RATE: 97 BPM | DIASTOLIC BLOOD PRESSURE: 96 MMHG | HEIGHT: 68 IN | SYSTOLIC BLOOD PRESSURE: 136 MMHG | OXYGEN SATURATION: 96 % | TEMPERATURE: 97.6 F | RESPIRATION RATE: 20 BRPM | BODY MASS INDEX: 28.1 KG/M2

## 2025-01-23 VITALS
HEIGHT: 68 IN | HEART RATE: 80 BPM | SYSTOLIC BLOOD PRESSURE: 159 MMHG | WEIGHT: 182 LBS | BODY MASS INDEX: 27.58 KG/M2 | OXYGEN SATURATION: 98 % | DIASTOLIC BLOOD PRESSURE: 88 MMHG | TEMPERATURE: 98.9 F

## 2025-01-23 DIAGNOSIS — H93.13 TINNITUS OF BOTH EARS: ICD-10-CM

## 2025-01-23 DIAGNOSIS — R04.0 ANTERIOR EPISTAXIS: Primary | ICD-10-CM

## 2025-01-23 DIAGNOSIS — H90.3 SENSORINEURAL HEARING LOSS (SNHL) OF BOTH EARS: ICD-10-CM

## 2025-01-23 DIAGNOSIS — R04.0 EPISTAXIS: Primary | ICD-10-CM

## 2025-01-23 PROCEDURE — 6370000000 HC RX 637 (ALT 250 FOR IP): Performed by: EMERGENCY MEDICINE

## 2025-01-23 PROCEDURE — 6360000002 HC RX W HCPCS: Performed by: EMERGENCY MEDICINE

## 2025-01-23 PROCEDURE — 2500000003 HC RX 250 WO HCPCS: Performed by: EMERGENCY MEDICINE

## 2025-01-23 PROCEDURE — 99283 EMERGENCY DEPT VISIT LOW MDM: CPT

## 2025-01-23 RX ORDER — OXYMETAZOLINE HYDROCHLORIDE 0.05 G/100ML
2 SPRAY NASAL ONCE
Status: COMPLETED | OUTPATIENT
Start: 2025-01-23 | End: 2025-01-23

## 2025-01-23 RX ORDER — TRANEXAMIC ACID 100 MG/ML
100 INJECTION, SOLUTION INTRAVENOUS ONCE
Status: COMPLETED | OUTPATIENT
Start: 2025-01-23 | End: 2025-01-23

## 2025-01-23 RX ADMIN — OXYMETAZOLINE HYDROCHLORIDE 2 SPRAY: 0.5 SPRAY NASAL at 13:54

## 2025-01-23 RX ADMIN — LIDOCAINE HYDROCHLORIDE 20 ML: 10; .005 INJECTION, SOLUTION EPIDURAL; INFILTRATION; INTRACAUDAL; PERINEURAL at 13:53

## 2025-01-23 RX ADMIN — TRANEXAMIC ACID 100 MG: 100 INJECTION, SOLUTION INTRAVENOUS at 14:23

## 2025-01-23 ASSESSMENT — PAIN DESCRIPTION - LOCATION: LOCATION: NOSE

## 2025-01-23 ASSESSMENT — PAIN - FUNCTIONAL ASSESSMENT: PAIN_FUNCTIONAL_ASSESSMENT: 0-10

## 2025-01-23 ASSESSMENT — PAIN SCALES - GENERAL
PAINLEVEL_OUTOF10: 0
PAINLEVEL_OUTOF10: 0

## 2025-01-23 NOTE — ED PROVIDER NOTES
EMERGENCY DEPARTMENT ENCOUNTER     [facility]     Pt Name: Kane Jj   MRN: [id number]   Birthdate 1955   Date of evaluation: 1/23/2025   Provider: Edison Jon MD   PCP: Alan Kasper MD   Note Started: 2:23 PM EST 1/23/25     CHIEF COMPLAINT     Chief Complaint   Patient presents with    Epistaxis     Onset around 1325. RN applied nose clamp and manual pressure to help control bleeding. Pt spitting up blood from where it is going down his throat. Pt A&O x4. Pt reports just leaving ENT office this morning @ 0800/0830 where they cauterized his nose and removed rinorocket         HISTORY OF PRESENT ILLNESS:  History from : Patient   Limitations to history : None     Kane Jj is a 69 y.o. male who  has a past medical history of Arthritis, Bronchitis, Diverticulitis, Hyperlipidemia, Hypertension, Kidney stone, and Prolonged emergence from general anesthesia. presents complaining of epistaxis that started at 1325.  Patient states that he was seen here in the ED last night for the same complaint and had an anterior packing placed in the right nostril.  Patient states that he went and saw ENT this morning at 8 AM and the packing was removed and he had his right nostril cauterized.  Patient states that the bleeding was controlled until 1325 when the bleeding spontaneously started again.  Patient denies any trauma to the nose.  No blood thinner use.  States that he tried holding pressure but still had bleeding and therefore came to the ED for evaluation.    Nursing Notes were all reviewed and agreed with or any disagreements were addressed in the HPI.     ROS: Positives and Pertinent negatives as per HPI.    PAST MEDICAL HISTORY     Past medical history:  has a past medical history of Arthritis, Bronchitis, Diverticulitis, Hyperlipidemia, Hypertension, Kidney stone, and Prolonged emergence from general anesthesia.    Past surgical history:  has a past surgical history that

## 2025-01-23 NOTE — PROGRESS NOTES
[facility]  DIVISION OF OTOLARYNGOLOGY- HEAD & NECK SURGERY  CONSULT      Kane Jj (:  1955) is a 69 y.o. male, here for evaluation of the following chief complaint(s):  New Patient (Pt stated that he had a Rhinorocket placed at 4am yesterday morning. Pt stated that a couple weeks ago he had a nosebleed and it took him about 10-15 to get stop. Pt stated that he had another nosebleed a few days ago and again took him about 10-15 to get to stop. But the nosebleed that he had yesterday he could not get to stop )      ASSESSMENT/PLAN:  1. Epistaxis  2. Sensorineural hearing loss (SNHL) of both ears  3. Tinnitus of both ears         This is a very pleasant 69 y.o. male here today for evaluation of the the above-noted complaints.      Assessment & Plan  1. Epistaxis.  The nasal packing was deflated and subsequently removed. A numbing spray was administered intranasally, followed by an endoscopic examination to identify the bleeding source. The affected area was cauterized. Both nostrils were examined to rule out the presence of any mass or tumor. He was advised to discontinue aspirin temporarily until the epistaxis is under control, after which it can be resumed when the weather is warmer and more humid. He was instructed to maintain nasal moisture using Xlear or nasal saline. The use of the SinuPulse machine was discouraged. He was also advised against the use of Flonase or any other nasal drying agents. The application of Vaseline in the nostrils at night was recommended. He was provided with sinus precautions, including avoiding nose blowing, picking, and straining. For immediate relief, he can take Tylenol or ibuprofen. Strict sinus precautions.     2. Tinnitus.  He reports that his tinnitus comes and goes, with occasional worsening. Once the epistaxis is managed, an updated hearing test will be conducted to monitor the asymmetry of his hearing loss. Depending on the results, an MRI may be considered,

## 2025-01-23 NOTE — ED NOTES
D/C: Order noted for d/c. Pt confirmed d/c paperwork has correct name. Discharge and education instructions reviewed with patient. Teach-back successful.  Pt verbalized understanding and denied questions at this time. No acute distress noted. Patient instructed to follow-up as noted - return to emergency department if symptoms worsen. Patient verbalized understanding. Discharged per EDMD with discharge instructions. Pt discharged to private vehicle. Patient stable upon departure. Thanked patient for [facility] for care. Provider aware of patient pain at time of discharge.

## 2025-01-23 NOTE — DISCHARGE INSTRUCTIONS
Call today / tomorrow for a follow up with Dr. Francisco in 1-2 days.    Nonprescription saline nasal sprays and nose drops can be used to keep your nasal tissues moist, relieve nasal irritation and help thick or dried mucus to drain.  You can also use Vaseline to the inside of your nose.    Return to the emergency department for worsening of pain, return of nose bleed, fever > 101.5, excessive nausea or vomiting, any other care or concern.

## 2025-01-29 ENCOUNTER — OFFICE VISIT (OUTPATIENT)
Dept: ENT CLINIC | Age: 70
End: 2025-01-29
Payer: COMMERCIAL

## 2025-01-29 VITALS
WEIGHT: 179 LBS | DIASTOLIC BLOOD PRESSURE: 80 MMHG | SYSTOLIC BLOOD PRESSURE: 149 MMHG | HEIGHT: 68 IN | HEART RATE: 74 BPM | OXYGEN SATURATION: 98 % | BODY MASS INDEX: 27.13 KG/M2

## 2025-01-29 DIAGNOSIS — R04.0 EPISTAXIS: Primary | ICD-10-CM

## 2025-01-29 PROCEDURE — 3079F DIAST BP 80-89 MM HG: CPT | Performed by: STUDENT IN AN ORGANIZED HEALTH CARE EDUCATION/TRAINING PROGRAM

## 2025-01-29 PROCEDURE — 3077F SYST BP >= 140 MM HG: CPT | Performed by: STUDENT IN AN ORGANIZED HEALTH CARE EDUCATION/TRAINING PROGRAM

## 2025-01-29 PROCEDURE — 99213 OFFICE O/P EST LOW 20 MIN: CPT | Performed by: STUDENT IN AN ORGANIZED HEALTH CARE EDUCATION/TRAINING PROGRAM

## 2025-01-29 PROCEDURE — 1123F ACP DISCUSS/DSCN MKR DOCD: CPT | Performed by: STUDENT IN AN ORGANIZED HEALTH CARE EDUCATION/TRAINING PROGRAM

## 2025-01-29 NOTE — PROGRESS NOTES
[facility]  DIVISION OF OTOLARYNGOLOGY- HEAD & NECK SURGERY  CONSULT      Kane Jj (:  1955) is a 69 y.o. male, here for evaluation of the following chief complaint(s):  Epistaxis      ASSESSMENT/PLAN:  1. Epistaxis           This is a very pleasant 69 y.o. male here today for evaluation of the the above-noted complaints.      -Rhino Rocket removed from the right nare.  No evidence of obvious source of bleeding.  The side that was cauterized is healed.    Follow-up as scheduled.  Continue nasal saline mist.  Afrin and pressure as needed bleeding.          Medical Decision Making:  The following items were considered in medical decision making:  Independent review of images  Review / order clinical lab tests  Review / order radiology tests  Decision to obtain old records  Review and summation of old records as accessed through [employer] if applicable    SUBJECTIVE/OBJECTIVE:  HPI    History of Present Illness  The patient is a 69-year-old male who presents for evaluation of epistaxis, hearing loss, and tinnitus.    He experienced a severe episode of epistaxis on Wednesday morning at 4 AM, which necessitated an emergency department visit due to his inability to control the bleeding. This incident marks the third occurrence of right-sided epistaxis within the past 3 to 4 weeks. The first two episodes were managed at home. He has no prior history of similar episodes. He reports no nasal obstruction or chronic sinus infections. He does not experience intermittent difficulty in nasal breathing. He has been using Xlear and Ponaris nasal sprays intermittently but has not used Ponaris for approximately 2 weeks and Xlear for about 3 to 4 weeks. He possesses a SinuPulse machine. Nasal packing was inserted into his right nostril as a temporary measure. He had initiated aspirin therapy a few weeks prior, taking one tablet every other day, but had discontinued its use 3 to 4 days before the onset of the recent

## 2025-02-15 NOTE — ED NOTES
Bleeding controlled per Md [place]field packing. Pt instructed on plan of care and f/u/ pt agreeable to plan.    yes...

## 2025-02-26 ENCOUNTER — OFFICE VISIT (OUTPATIENT)
Dept: ENT CLINIC | Age: 70
End: 2025-02-26
Payer: COMMERCIAL

## 2025-02-26 VITALS
HEIGHT: 68 IN | SYSTOLIC BLOOD PRESSURE: 149 MMHG | OXYGEN SATURATION: 96 % | BODY MASS INDEX: 28.04 KG/M2 | WEIGHT: 185 LBS | DIASTOLIC BLOOD PRESSURE: 81 MMHG | HEART RATE: 89 BPM

## 2025-02-26 DIAGNOSIS — H93.13 TINNITUS OF BOTH EARS: ICD-10-CM

## 2025-02-26 DIAGNOSIS — R04.0 EPISTAXIS: Primary | ICD-10-CM

## 2025-02-26 DIAGNOSIS — G47.30 SLEEP APNEA, UNSPECIFIED TYPE: ICD-10-CM

## 2025-02-26 DIAGNOSIS — H90.3 SENSORINEURAL HEARING LOSS (SNHL) OF BOTH EARS: ICD-10-CM

## 2025-02-26 PROCEDURE — 99213 OFFICE O/P EST LOW 20 MIN: CPT | Performed by: STUDENT IN AN ORGANIZED HEALTH CARE EDUCATION/TRAINING PROGRAM

## 2025-02-26 PROCEDURE — 1123F ACP DISCUSS/DSCN MKR DOCD: CPT | Performed by: STUDENT IN AN ORGANIZED HEALTH CARE EDUCATION/TRAINING PROGRAM

## 2025-02-26 PROCEDURE — 3079F DIAST BP 80-89 MM HG: CPT | Performed by: STUDENT IN AN ORGANIZED HEALTH CARE EDUCATION/TRAINING PROGRAM

## 2025-02-26 PROCEDURE — 3077F SYST BP >= 140 MM HG: CPT | Performed by: STUDENT IN AN ORGANIZED HEALTH CARE EDUCATION/TRAINING PROGRAM

## 2025-02-26 NOTE — PROGRESS NOTES
[facility]  DIVISION OF OTOLARYNGOLOGY- HEAD & NECK SURGERY  CONSULT      Kane Jj (:  1955) is a 69 y.o. male, here for evaluation of the following chief complaint(s):  Follow-up and Epistaxis (No nosebleed)      ASSESSMENT/PLAN:  1. Epistaxis  2. Sensorineural hearing loss (SNHL) of both ears  3. Tinnitus of both ears  4. Sleep apnea, unspecified type           This is a very pleasant 69 y.o. male here today for evaluation of the the above-noted complaints.      -Recommend an updated audiogram for his tinnitus and hearing loss.  He had an asymmetry at 1 frequency on his audiogram from .  Discussed that his hearing loss is to the point where he would need something stronger than over-the-counter hearing aids  -Discussed epistaxis management strategies  -Patient has a history of sleep apnea and UPPP.  Discussed that we can get an updated sleep study.  He will call our office if he would like to have us order this, or his PCP can order it.      Medical Decision Making:  The following items were considered in medical decision making:  Independent review of images  Review / order clinical lab tests  Review / order radiology tests  Decision to obtain old records  Review and summation of old records as accessed through [employer] if applicable    SUBJECTIVE/OBJECTIVE:  HPI    History of Present Illness  The patient is a 69-year-old male who presents for evaluation of epistaxis, hearing loss, and tinnitus.    He experienced a severe episode of epistaxis on Wednesday morning at 4 AM, which necessitated an emergency department visit due to his inability to control the bleeding. This incident marks the third occurrence of right-sided epistaxis within the past 3 to 4 weeks. The first two episodes were managed at home. He has no prior history of similar episodes. He reports no nasal obstruction or chronic sinus infections. He does not experience intermittent difficulty in nasal breathing. He has been

## 2025-02-28 ENCOUNTER — TELEPHONE (OUTPATIENT)
Dept: INTERNAL MEDICINE CLINIC | Age: 70
End: 2025-02-28

## 2025-02-28 NOTE — TELEPHONE ENCOUNTER
Pt called, has an appoint on March 27. Would like to know if his labs can be ordered before his appointment so he can have them done before    Please call him and let him know    Thank you

## 2025-03-25 SDOH — ECONOMIC STABILITY: TRANSPORTATION INSECURITY
IN THE PAST 12 MONTHS, HAS LACK OF TRANSPORTATION KEPT YOU FROM MEETINGS, WORK, OR FROM GETTING THINGS NEEDED FOR DAILY LIVING?: NO

## 2025-03-25 SDOH — ECONOMIC STABILITY: FOOD INSECURITY: WITHIN THE PAST 12 MONTHS, THE FOOD YOU BOUGHT JUST DIDN'T LAST AND YOU DIDN'T HAVE MONEY TO GET MORE.: NEVER TRUE

## 2025-03-25 SDOH — ECONOMIC STABILITY: FOOD INSECURITY: WITHIN THE PAST 12 MONTHS, YOU WORRIED THAT YOUR FOOD WOULD RUN OUT BEFORE YOU GOT MONEY TO BUY MORE.: NEVER TRUE

## 2025-03-25 SDOH — ECONOMIC STABILITY: INCOME INSECURITY: IN THE LAST 12 MONTHS, WAS THERE A TIME WHEN YOU WERE NOT ABLE TO PAY THE MORTGAGE OR RENT ON TIME?: NO

## 2025-03-25 SDOH — ECONOMIC STABILITY: TRANSPORTATION INSECURITY
IN THE PAST 12 MONTHS, HAS THE LACK OF TRANSPORTATION KEPT YOU FROM MEDICAL APPOINTMENTS OR FROM GETTING MEDICATIONS?: NO

## 2025-03-25 ASSESSMENT — PATIENT HEALTH QUESTIONNAIRE - PHQ9
SUM OF ALL RESPONSES TO PHQ QUESTIONS 1-9: 0
SUM OF ALL RESPONSES TO PHQ QUESTIONS 1-9: 0
2. FEELING DOWN, DEPRESSED OR HOPELESS: NOT AT ALL
SUM OF ALL RESPONSES TO PHQ QUESTIONS 1-9: 0
SUM OF ALL RESPONSES TO PHQ9 QUESTIONS 1 & 2: 0
2. FEELING DOWN, DEPRESSED OR HOPELESS: NOT AT ALL
SUM OF ALL RESPONSES TO PHQ QUESTIONS 1-9: 0
1. LITTLE INTEREST OR PLEASURE IN DOING THINGS: NOT AT ALL
1. LITTLE INTEREST OR PLEASURE IN DOING THINGS: NOT AT ALL

## 2025-03-27 ENCOUNTER — OFFICE VISIT (OUTPATIENT)
Dept: INTERNAL MEDICINE CLINIC | Age: 70
End: 2025-03-27

## 2025-03-27 VITALS
WEIGHT: 182 LBS | OXYGEN SATURATION: 97 % | DIASTOLIC BLOOD PRESSURE: 88 MMHG | SYSTOLIC BLOOD PRESSURE: 130 MMHG | BODY MASS INDEX: 27.67 KG/M2 | HEART RATE: 67 BPM

## 2025-03-27 DIAGNOSIS — M94.0 COSTOCHONDRITIS: ICD-10-CM

## 2025-03-27 DIAGNOSIS — Z87.442 HISTORY OF KIDNEY STONES: ICD-10-CM

## 2025-03-27 DIAGNOSIS — I10 HYPERTENSION, BENIGN: Primary | ICD-10-CM

## 2025-03-27 DIAGNOSIS — K21.9 GASTROESOPHAGEAL REFLUX DISEASE WITHOUT ESOPHAGITIS: ICD-10-CM

## 2025-03-27 DIAGNOSIS — R04.0 RECURRENT EPISTAXIS: ICD-10-CM

## 2025-03-27 DIAGNOSIS — Z12.5 SCREENING FOR PROSTATE CANCER: ICD-10-CM

## 2025-03-27 LAB
25(OH)D3 SERPL-MCNC: 23.9 NG/ML
ALBUMIN SERPL-MCNC: 4.5 G/DL (ref 3.4–5)
ALBUMIN/GLOB SERPL: 1.6 {RATIO} (ref 1.1–2.2)
ALP SERPL-CCNC: 116 U/L (ref 40–129)
ALT SERPL-CCNC: 67 U/L (ref 10–40)
ANION GAP SERPL CALCULATED.3IONS-SCNC: 12 MMOL/L (ref 3–16)
AST SERPL-CCNC: 43 U/L (ref 15–37)
BASOPHILS # BLD: 0.1 K/UL (ref 0–0.2)
BASOPHILS NFR BLD: 1 %
BILIRUB SERPL-MCNC: 0.7 MG/DL (ref 0–1)
BUN SERPL-MCNC: 17 MG/DL (ref 7–20)
CALCIUM SERPL-MCNC: 9.7 MG/DL (ref 8.3–10.6)
CHLORIDE SERPL-SCNC: 101 MMOL/L (ref 99–110)
CHOLEST SERPL-MCNC: 202 MG/DL (ref 0–199)
CO2 SERPL-SCNC: 25 MMOL/L (ref 21–32)
CREAT SERPL-MCNC: 0.8 MG/DL (ref 0.8–1.3)
DEPRECATED RDW RBC AUTO: 13.5 % (ref 12.4–15.4)
EOSINOPHIL # BLD: 0.2 K/UL (ref 0–0.6)
EOSINOPHIL NFR BLD: 3.1 %
GFR SERPLBLD CREATININE-BSD FMLA CKD-EPI: >90 ML/MIN/{1.73_M2}
GLUCOSE SERPL-MCNC: 93 MG/DL (ref 70–99)
HCT VFR BLD AUTO: 47.3 % (ref 40.5–52.5)
HDLC SERPL-MCNC: 42 MG/DL (ref 40–60)
HGB BLD-MCNC: 15.9 G/DL (ref 13.5–17.5)
LDLC SERPL CALC-MCNC: 143 MG/DL
LYMPHOCYTES # BLD: 1.7 K/UL (ref 1–5.1)
LYMPHOCYTES NFR BLD: 24.9 %
MCH RBC QN AUTO: 31.6 PG (ref 26–34)
MCHC RBC AUTO-ENTMCNC: 33.7 G/DL (ref 31–36)
MCV RBC AUTO: 93.7 FL (ref 80–100)
MONOCYTES # BLD: 0.8 K/UL (ref 0–1.3)
MONOCYTES NFR BLD: 12.2 %
NEUTROPHILS # BLD: 4 K/UL (ref 1.7–7.7)
NEUTROPHILS NFR BLD: 58.8 %
PLATELET # BLD AUTO: 270 K/UL (ref 135–450)
PMV BLD AUTO: 8.8 FL (ref 5–10.5)
POTASSIUM SERPL-SCNC: 4.4 MMOL/L (ref 3.5–5.1)
PROT SERPL-MCNC: 7.3 G/DL (ref 6.4–8.2)
PSA SERPL DL<=0.01 NG/ML-MCNC: 1.14 NG/ML (ref 0–4)
RBC # BLD AUTO: 5.05 M/UL (ref 4.2–5.9)
SODIUM SERPL-SCNC: 138 MMOL/L (ref 136–145)
TRIGL SERPL-MCNC: 84 MG/DL (ref 0–150)
VLDLC SERPL CALC-MCNC: 17 MG/DL
WBC # BLD AUTO: 6.8 K/UL (ref 4–11)

## 2025-03-27 NOTE — ASSESSMENT & PLAN NOTE
Mostly resolved. He reports chest tightness when working a few days ago.     Orders:    Vitamin D 25 Hydroxy    Lipid Panel    Hemoglobin A1C    Comprehensive Metabolic Panel    CBC with Auto Differential

## 2025-03-27 NOTE — ASSESSMENT & PLAN NOTE
Well controlled    Orders:    Vitamin D 25 Hydroxy    Lipid Panel    Hemoglobin A1C    Comprehensive Metabolic Panel    CBC with Auto Differential

## 2025-03-27 NOTE — PROGRESS NOTES
Kane Jj is a 70 y.o. male with a past medical history noted below who presents for routine follow up on chronic conditions and discussion of preventative health care.  Chief Complaint   Patient presents with    Annual Exam     Has some concerns of still having chest issues but not as much,            Past Medical History:   Diagnosis Date    Arthritis     Bronchitis     Diverticulitis     Hyperlipidemia     Hypertension     Kidney stone     Prolonged emergence from general anesthesia      Patient Active Problem List   Diagnosis    Diverticulitis    Sepsis due to Gram-negative organism with no resultant organ failure (HCC)    Abdominal pain, bilateral lower quadrant    Anemia    Arthropathy of hand    Osteoarthrosis involving multiple sites    Osteoarthrosis    Benign hypertensive heart disease without congestive heart failure    Benign prostatic hyperplasia without lower urinary tract symptoms    Blood in stool    Disc disorder of cervical region    Disorder of rotator cuff syndrome of shoulder and allied disorder    Colon polyps    Diverticulitis of colon    Dizziness    Dyshidrotic eczema    Edema    Enlarged lymph nodes    Extrinsic asthma    Gastroesophageal reflux disease    Familial multiple lipoprotein-type hyperlipidemia    Hypercholesterolemia    Mixed hyperlipidemia    Hypertension, benign    Hypopotassemia    Ureteral obstruction, right    SOB (shortness of breath)    Neuralgia and neuritis    Costochondritis       Vitals:    03/27/25 0841   BP: 130/88   Pulse: 67   SpO2: 97%   Weight: 82.6 kg (182 lb)     General: Alert and oriented X3. No acute distress  Eyes: PEERL. No scleral icterus noted. Normal appearing conjunctiva bilaterally  Ears: Normal TM and external canal bilaterally.  Oral cavity/Throat: No pharyngeal erythema. No oral lesions.  Cardiovascular: Heart is regular rate and rhythm. No murmurs noted. Radial pulses 2+. Posterior tibial pulses 2+. No lower extremity edema noted  Pulmonary:

## 2025-03-27 NOTE — ASSESSMENT & PLAN NOTE
Blood pressure is well controlled.     Orders:    Vitamin D 25 Hydroxy    Lipid Panel    Hemoglobin A1C    Comprehensive Metabolic Panel    CBC with Auto Differential

## 2025-03-28 LAB
EST. AVERAGE GLUCOSE BLD GHB EST-MCNC: 102.5 MG/DL
HBA1C MFR BLD: 5.2 %

## 2025-03-31 ENCOUNTER — RESULTS FOLLOW-UP (OUTPATIENT)
Dept: INTERNAL MEDICINE CLINIC | Age: 70
End: 2025-03-31

## 2025-03-31 RX ORDER — ERGOCALCIFEROL 1.25 MG/1
50000 CAPSULE, LIQUID FILLED ORAL WEEKLY
Qty: 12 CAPSULE | Refills: 1 | Status: SHIPPED | OUTPATIENT
Start: 2025-03-31

## 2025-04-18 DIAGNOSIS — I10 HYPERTENSION, BENIGN: ICD-10-CM

## 2025-04-18 RX ORDER — HYDROCHLOROTHIAZIDE 12.5 MG/1
12.5 CAPSULE ORAL EVERY MORNING
Qty: 90 CAPSULE | Refills: 3 | Status: SHIPPED | OUTPATIENT
Start: 2025-04-18

## 2025-04-21 ENCOUNTER — HOSPITAL ENCOUNTER (OUTPATIENT)
Age: 70
Discharge: HOME | End: 2025-04-21
Payer: COMMERCIAL

## 2025-04-21 ENCOUNTER — HOSPITAL ENCOUNTER (OUTPATIENT)
Dept: GENERAL RADIOLOGY | Age: 70
Discharge: HOME | End: 2025-04-21
Payer: COMMERCIAL

## 2025-04-21 DIAGNOSIS — Z00.00 EXAMINATION, MEDICAL, GENERAL: ICD-10-CM

## 2025-04-21 PROCEDURE — 71046 X-RAY EXAM CHEST 2 VIEWS: CPT

## 2025-07-16 DIAGNOSIS — I10 HYPERTENSION, BENIGN: ICD-10-CM

## 2025-07-16 RX ORDER — AMLODIPINE BESYLATE 10 MG/1
10 TABLET ORAL DAILY
Qty: 90 TABLET | Refills: 3 | Status: SHIPPED | OUTPATIENT
Start: 2025-07-16

## 2025-08-12 ENCOUNTER — OFFICE VISIT (OUTPATIENT)
Dept: INTERNAL MEDICINE CLINIC | Age: 70
End: 2025-08-12
Payer: COMMERCIAL

## 2025-08-12 VITALS
HEART RATE: 86 BPM | DIASTOLIC BLOOD PRESSURE: 90 MMHG | WEIGHT: 177 LBS | OXYGEN SATURATION: 95 % | BODY MASS INDEX: 26.91 KG/M2 | SYSTOLIC BLOOD PRESSURE: 148 MMHG

## 2025-08-12 DIAGNOSIS — M62.838 MUSCLE SPASM: ICD-10-CM

## 2025-08-12 DIAGNOSIS — M54.50 ACUTE RIGHT-SIDED LOW BACK PAIN WITHOUT SCIATICA: Primary | ICD-10-CM

## 2025-08-12 DIAGNOSIS — I10 HYPERTENSION, BENIGN: ICD-10-CM

## 2025-08-12 PROCEDURE — 3077F SYST BP >= 140 MM HG: CPT | Performed by: STUDENT IN AN ORGANIZED HEALTH CARE EDUCATION/TRAINING PROGRAM

## 2025-08-12 PROCEDURE — 99214 OFFICE O/P EST MOD 30 MIN: CPT | Performed by: STUDENT IN AN ORGANIZED HEALTH CARE EDUCATION/TRAINING PROGRAM

## 2025-08-12 PROCEDURE — G2211 COMPLEX E/M VISIT ADD ON: HCPCS | Performed by: STUDENT IN AN ORGANIZED HEALTH CARE EDUCATION/TRAINING PROGRAM

## 2025-08-12 PROCEDURE — 1123F ACP DISCUSS/DSCN MKR DOCD: CPT | Performed by: STUDENT IN AN ORGANIZED HEALTH CARE EDUCATION/TRAINING PROGRAM

## 2025-08-12 PROCEDURE — 3080F DIAST BP >= 90 MM HG: CPT | Performed by: STUDENT IN AN ORGANIZED HEALTH CARE EDUCATION/TRAINING PROGRAM

## 2025-08-12 RX ORDER — TIZANIDINE 2 MG/1
2 TABLET ORAL EVERY 8 HOURS PRN
Qty: 20 TABLET | Refills: 0 | Status: SHIPPED | OUTPATIENT
Start: 2025-08-12

## 2025-08-12 ASSESSMENT — ENCOUNTER SYMPTOMS
BACK PAIN: 1
BOWEL INCONTINENCE: 0

## 2025-08-27 ENCOUNTER — OFFICE VISIT (OUTPATIENT)
Dept: ORTHOPEDIC SURGERY | Age: 70
End: 2025-08-27

## 2025-08-27 VITALS — HEIGHT: 68 IN | BODY MASS INDEX: 26.91 KG/M2

## 2025-08-27 DIAGNOSIS — Z96.611 HISTORY OF TOTAL REPLACEMENT OF RIGHT SHOULDER JOINT: Primary | ICD-10-CM

## (undated) DEVICE — 3 BONE CEMENT MIXER: Brand: MIXEVAC

## (undated) DEVICE — UNDERGLOVE SURG SZ 8 BLU LTX FREE SYN POLYISOPRENE POLYMER

## (undated) DEVICE — 3M™ COBAN™ NL STERILE NON-LATEX SELF-ADHERENT WRAP, 2086S, 6 IN X 5 YD (15 CM X 4,5 M), 12 ROLLS/CASE: Brand: 3M™ COBAN™

## (undated) DEVICE — GOWN,SIRUS,POLYRNF,BRTHSLV,XL,30/CS: Brand: MEDLINE

## (undated) DEVICE — TOTAL SHOULDER: Brand: MEDLINE INDUSTRIES, INC.

## (undated) DEVICE — ALTIVATE ANAT PT MATCHED GLEN DRL GUID

## (undated) DEVICE — GOWN,REINFRCE,POLY,ECLIPSE,SLV,3XLG: Brand: MEDLINE

## (undated) DEVICE — SHOULDER STABILIZATION KIT,                                    DISPOSABLE 12 PER BOX

## (undated) DEVICE — 4-PORT MANIFOLD: Brand: NEPTUNE 2

## (undated) DEVICE — SUTURE MCRYL + SZ 4-0 L18IN ABSRB UD L19MM PS-2 3/8 CIR MCP496G

## (undated) DEVICE — PIN DRVR L35IN BNE QUIK REL SMOOTH

## (undated) DEVICE — SUTURE ETHBND EXCEL SZ 2 L30IN NONABSORBABLE GRN L40MM V-37 MX69G

## (undated) DEVICE — SUTURE VCRL + SZ 0 L18IN ABSRB UD L36MM CT-1 1/2 CIR VCP840D

## (undated) DEVICE — 3M™ IOBAN™ 2 ANTIMICROBIAL INCISE DRAPE 6640EZ: Brand: IOBAN™ 2

## (undated) DEVICE — SOLUTION IRRIG 3000ML 0.9% SOD CHL USP UROMATIC PLAS CONT

## (undated) DEVICE — SUTURE VCRL SZ 2-0 L18IN ABSRB UD CT-1 L36MM 1/2 CIR J839D

## (undated) DEVICE — SOLUTION IV 1000ML 0.9% SOD CHL

## (undated) DEVICE — SOLUTION IRRIG 1000ML STRL H2O USP PLAS POUR BTL

## (undated) DEVICE — ADHESIVE SKIN CLOSURE WND 8.661X1.5 IN 22 CM LIQUIBAND SECUR

## (undated) DEVICE — GOWN,SIRUS,POLYRNF,BRTHSLV,LG,30/CS: Brand: MEDLINE

## (undated) DEVICE — TOWEL,STOP FLAG GOLD N-W: Brand: MEDLINE

## (undated) DEVICE — Device

## (undated) DEVICE — MAT FLR W32XL58IN

## (undated) DEVICE — GLOVE ORANGE PI 8   MSG9080

## (undated) DEVICE — BLADE,CARBON-STEEL,10,STRL,DISPOSABLE,TB: Brand: MEDLINE

## (undated) DEVICE — GOWN,SIRUS,POLYRNF,BRTHSLV,XLN/XXL,18/CS: Brand: MEDLINE